# Patient Record
Sex: FEMALE | Race: WHITE | URBAN - METROPOLITAN AREA
[De-identification: names, ages, dates, MRNs, and addresses within clinical notes are randomized per-mention and may not be internally consistent; named-entity substitution may affect disease eponyms.]

---

## 2018-07-08 ENCOUNTER — INPATIENT (INPATIENT)
Facility: HOSPITAL | Age: 83
LOS: 2 days | Discharge: NURSING, REHAB OR LONG-TERM CARE | End: 2018-07-11
Attending: INTERNAL MEDICINE | Admitting: INTERNAL MEDICINE
Payer: MEDICARE

## 2018-07-08 VITALS
SYSTOLIC BLOOD PRESSURE: 132 MMHG | RESPIRATION RATE: 18 BRPM | TEMPERATURE: 99 F | DIASTOLIC BLOOD PRESSURE: 67 MMHG | HEART RATE: 103 BPM | WEIGHT: 179.02 LBS | OXYGEN SATURATION: 95 %

## 2018-07-08 DIAGNOSIS — Z90.49 ACQUIRED ABSENCE OF OTHER SPECIFIED PARTS OF DIGESTIVE TRACT: Chronic | ICD-10-CM

## 2018-07-08 LAB
ADD ON TEST-SPECIMEN IN LAB: SIGNIFICANT CHANGE UP
ALBUMIN SERPL ELPH-MCNC: 3.5 G/DL — SIGNIFICANT CHANGE UP (ref 3.3–5)
ALP SERPL-CCNC: 72 U/L — SIGNIFICANT CHANGE UP (ref 40–120)
ALT FLD-CCNC: 15 U/L — SIGNIFICANT CHANGE UP (ref 12–78)
ANION GAP SERPL CALC-SCNC: 7 MMOL/L — SIGNIFICANT CHANGE UP (ref 5–17)
APPEARANCE UR: CLEAR — SIGNIFICANT CHANGE UP
AST SERPL-CCNC: 21 U/L — SIGNIFICANT CHANGE UP (ref 15–37)
BACTERIA # UR AUTO: ABNORMAL
BASOPHILS # BLD AUTO: 0.04 K/UL — SIGNIFICANT CHANGE UP (ref 0–0.2)
BASOPHILS NFR BLD AUTO: 0.4 % — SIGNIFICANT CHANGE UP (ref 0–2)
BILIRUB SERPL-MCNC: 0.7 MG/DL — SIGNIFICANT CHANGE UP (ref 0.2–1.2)
BILIRUB UR-MCNC: NEGATIVE — SIGNIFICANT CHANGE UP
BUN SERPL-MCNC: 30 MG/DL — HIGH (ref 7–23)
CALCIUM SERPL-MCNC: 9.8 MG/DL — SIGNIFICANT CHANGE UP (ref 8.5–10.1)
CHLORIDE SERPL-SCNC: 103 MMOL/L — SIGNIFICANT CHANGE UP (ref 96–108)
CK SERPL-CCNC: 258 U/L — HIGH (ref 26–192)
CO2 SERPL-SCNC: 29 MMOL/L — SIGNIFICANT CHANGE UP (ref 22–31)
COLOR SPEC: YELLOW — SIGNIFICANT CHANGE UP
CREAT SERPL-MCNC: 1.39 MG/DL — HIGH (ref 0.5–1.3)
DIFF PNL FLD: ABNORMAL
EOSINOPHIL # BLD AUTO: 0.05 K/UL — SIGNIFICANT CHANGE UP (ref 0–0.5)
EOSINOPHIL NFR BLD AUTO: 0.5 % — SIGNIFICANT CHANGE UP (ref 0–6)
EPI CELLS # UR: SIGNIFICANT CHANGE UP
GLUCOSE SERPL-MCNC: 104 MG/DL — HIGH (ref 70–99)
GLUCOSE UR QL: NEGATIVE MG/DL — SIGNIFICANT CHANGE UP
HCT VFR BLD CALC: 43.2 % — SIGNIFICANT CHANGE UP (ref 34.5–45)
HGB BLD-MCNC: 14.2 G/DL — SIGNIFICANT CHANGE UP (ref 11.5–15.5)
HYALINE CASTS # UR AUTO: ABNORMAL /LPF
IMM GRANULOCYTES NFR BLD AUTO: 0.2 % — SIGNIFICANT CHANGE UP (ref 0–1.5)
KETONES UR-MCNC: ABNORMAL
LACTATE SERPL-SCNC: 0.9 MMOL/L — SIGNIFICANT CHANGE UP (ref 0.7–2)
LACTATE SERPL-SCNC: 2.6 MMOL/L — HIGH (ref 0.7–2)
LEUKOCYTE ESTERASE UR-ACNC: ABNORMAL
LYMPHOCYTES # BLD AUTO: 1.42 K/UL — SIGNIFICANT CHANGE UP (ref 1–3.3)
LYMPHOCYTES # BLD AUTO: 13.3 % — SIGNIFICANT CHANGE UP (ref 13–44)
MCHC RBC-ENTMCNC: 28.6 PG — SIGNIFICANT CHANGE UP (ref 27–34)
MCHC RBC-ENTMCNC: 32.9 GM/DL — SIGNIFICANT CHANGE UP (ref 32–36)
MCV RBC AUTO: 87.1 FL — SIGNIFICANT CHANGE UP (ref 80–100)
MONOCYTES # BLD AUTO: 0.88 K/UL — SIGNIFICANT CHANGE UP (ref 0–0.9)
MONOCYTES NFR BLD AUTO: 8.2 % — SIGNIFICANT CHANGE UP (ref 2–14)
NEUTROPHILS # BLD AUTO: 8.26 K/UL — HIGH (ref 1.8–7.4)
NEUTROPHILS NFR BLD AUTO: 77.4 % — HIGH (ref 43–77)
NITRITE UR-MCNC: NEGATIVE — SIGNIFICANT CHANGE UP
NRBC # BLD: 0 /100 WBCS — SIGNIFICANT CHANGE UP (ref 0–0)
PH UR: 5 — SIGNIFICANT CHANGE UP (ref 5–8)
PLATELET # BLD AUTO: 267 K/UL — SIGNIFICANT CHANGE UP (ref 150–400)
POTASSIUM SERPL-MCNC: 5 MMOL/L — SIGNIFICANT CHANGE UP (ref 3.5–5.3)
POTASSIUM SERPL-SCNC: 5 MMOL/L — SIGNIFICANT CHANGE UP (ref 3.5–5.3)
PROT SERPL-MCNC: 7.1 GM/DL — SIGNIFICANT CHANGE UP (ref 6–8.3)
PROT UR-MCNC: 100 MG/DL
RBC # BLD: 4.96 M/UL — SIGNIFICANT CHANGE UP (ref 3.8–5.2)
RBC # FLD: 13 % — SIGNIFICANT CHANGE UP (ref 10.3–14.5)
RBC CASTS # UR COMP ASSIST: ABNORMAL /HPF (ref 0–4)
SODIUM SERPL-SCNC: 139 MMOL/L — SIGNIFICANT CHANGE UP (ref 135–145)
SP GR SPEC: 1.02 — SIGNIFICANT CHANGE UP (ref 1.01–1.02)
UROBILINOGEN FLD QL: NEGATIVE MG/DL — SIGNIFICANT CHANGE UP
WBC # BLD: 10.67 K/UL — HIGH (ref 3.8–10.5)
WBC # FLD AUTO: 10.67 K/UL — HIGH (ref 3.8–10.5)
WBC UR QL: ABNORMAL

## 2018-07-08 PROCEDURE — 99285 EMERGENCY DEPT VISIT HI MDM: CPT

## 2018-07-08 PROCEDURE — 74176 CT ABD & PELVIS W/O CONTRAST: CPT | Mod: 26

## 2018-07-08 PROCEDURE — 71250 CT THORAX DX C-: CPT | Mod: 26

## 2018-07-08 PROCEDURE — 70450 CT HEAD/BRAIN W/O DYE: CPT | Mod: 26

## 2018-07-08 PROCEDURE — 72125 CT NECK SPINE W/O DYE: CPT | Mod: 26

## 2018-07-08 RX ORDER — CEFTRIAXONE 500 MG/1
1 INJECTION, POWDER, FOR SOLUTION INTRAMUSCULAR; INTRAVENOUS EVERY 24 HOURS
Qty: 0 | Refills: 0 | Status: DISCONTINUED | OUTPATIENT
Start: 2018-07-08 | End: 2018-07-08

## 2018-07-08 RX ORDER — SODIUM CHLORIDE 9 MG/ML
1000 INJECTION INTRAMUSCULAR; INTRAVENOUS; SUBCUTANEOUS ONCE
Qty: 0 | Refills: 0 | Status: COMPLETED | OUTPATIENT
Start: 2018-07-08 | End: 2018-07-08

## 2018-07-08 RX ORDER — CEFTRIAXONE 500 MG/1
1000 INJECTION, POWDER, FOR SOLUTION INTRAMUSCULAR; INTRAVENOUS EVERY 24 HOURS
Qty: 0 | Refills: 0 | Status: DISCONTINUED | OUTPATIENT
Start: 2018-07-08 | End: 2018-07-09

## 2018-07-08 RX ORDER — LIDOCAINE 4 G/100G
1 CREAM TOPICAL ONCE
Qty: 0 | Refills: 0 | Status: COMPLETED | OUTPATIENT
Start: 2018-07-08 | End: 2018-07-08

## 2018-07-08 RX ORDER — ACETAMINOPHEN 500 MG
1000 TABLET ORAL ONCE
Qty: 0 | Refills: 0 | Status: COMPLETED | OUTPATIENT
Start: 2018-07-08 | End: 2018-07-08

## 2018-07-08 RX ORDER — MORPHINE SULFATE 50 MG/1
2 CAPSULE, EXTENDED RELEASE ORAL ONCE
Qty: 0 | Refills: 0 | Status: DISCONTINUED | OUTPATIENT
Start: 2018-07-08 | End: 2018-07-08

## 2018-07-08 RX ADMIN — CEFTRIAXONE 1000 MILLIGRAM(S): 500 INJECTION, POWDER, FOR SOLUTION INTRAMUSCULAR; INTRAVENOUS at 22:17

## 2018-07-08 RX ADMIN — Medication 1000 MILLIGRAM(S): at 19:50

## 2018-07-08 RX ADMIN — LIDOCAINE 1 PATCH: 4 CREAM TOPICAL at 21:41

## 2018-07-08 RX ADMIN — SODIUM CHLORIDE 500 MILLILITER(S): 9 INJECTION INTRAMUSCULAR; INTRAVENOUS; SUBCUTANEOUS at 19:50

## 2018-07-08 RX ADMIN — SODIUM CHLORIDE 1000 MILLILITER(S): 9 INJECTION INTRAMUSCULAR; INTRAVENOUS; SUBCUTANEOUS at 18:00

## 2018-07-08 RX ADMIN — MORPHINE SULFATE 2 MILLIGRAM(S): 50 CAPSULE, EXTENDED RELEASE ORAL at 21:41

## 2018-07-08 NOTE — H&P ADULT - NSHPPHYSICALEXAM_GEN_ALL_CORE
Vital Signs Last 24 Hrs  T(C): 37 (08 Jul 2018 17:18), Max: 37 (08 Jul 2018 17:18)  T(F): 98.6 (08 Jul 2018 17:18), Max: 98.6 (08 Jul 2018 17:18)  HR: 103 (08 Jul 2018 17:18) (103 - 103)  BP: 132/67 (08 Jul 2018 17:18) (132/67 - 132/67)  RR: 18 (08 Jul 2018 17:18) (18 - 18)  SpO2: 95% (08 Jul 2018 17:18) (95% - 95%)

## 2018-07-08 NOTE — ED ADULT NURSE REASSESSMENT NOTE - NS ED NURSE REASSESS COMMENT FT1
pt removed from bedpan, urine clear and yellow, specimen collected and sent to lab. pt repositioned for comfort, will continue to monitor
Assumed care of patient from IESHA Diaz. Patient resting comfortably in bed. Patient boosted in bed to eat turkey sandwich and tea. Safety and comfort maintained. Will continue to [facility].

## 2018-07-08 NOTE — ED PROVIDER NOTE - PSYCHIATRIC, MLM
Alert and oriented to person, place, time/situation. normal mood and affect. no apparent risk to self or others. No apparent risk to self or others.

## 2018-07-08 NOTE — H&P ADULT - ASSESSMENT
85 yo F with PMH significant for HTN, Hypothyroid presents to the ED s/p fall at home.    # S/p Mechanical fall at home  # elevated CPK  - Admit to med-surg  - Pt received IVF NS 2 L in the ED, will c/w IV gentle hydration  - repeat CPK  - CTAP/CT chest/CT cervical spine - resulted as negative for acute findings preliminary report - f/u final report  - CT lumbar spine?  - Fall precaution  - PT eval    # Dehydration/ANDREZ? ( No past EMR -Serum. Cr to comapre)  - Pt received IVF NS 2L in the ED, will c/w IV gentle hydration  - Monitor BUN/Cr    # Sepsis 2/2 to UTI  # Tachycardia/ Elevated Lactate 2.6 on admission/UA indicative of UTI  - IV Ceftriaxone  - UC  - Repeat lactate s/p 2L IVF NS bolus resulted as 0.9    # CTAP incidental findings: +Small hiatal hernia. 2.2 cm abnormal mass in the partially calcified mesenteric lesions.  - Outpt fu with PCP after discharge for further eval.    # Hx of HTN  - Hold on Lisinopril 2/2 to Elevated Cr  - Hold on Spironolactone for now as pt is receiving IVF   - Monitor BP    # Hypothyroid  - TSH check  - c/w [place] Thyroid    # DVT Ppx  - SCD's     Case d/w  87 yo F with PMH significant for HTN, Hypothyroid presents to the ED s/p fall at home.    # S/p Mechanical fall at home  # elevated CPK  - Admit to med-surg  - Pt received IVF NS 2 L in the ED, will c/w IV gentle hydration  - repeat CPK  - CTAP/CT chest/CT cervical spine - resulted as negative for acute findings preliminary report - f/u final report  - CT lumbar spine?  - Fall precaution  - PT eval    # Dehydration/ANDREZ? ( No past EMR -Serum. Cr to comapre)  - Pt received IVF NS 2L in the ED, will c/w IV gentle hydration  - Monitor BUN/Cr    # UTI  # Tachycardia/ Elevated Lactate 2.6 on admission/UA indicative of UTI  - IV Ceftriaxone  - UC  - Repeat lactate s/p 2L IVF NS bolus resulted as 0.9    # CTAP incidental findings: +Small hiatal hernia. 2.2 cm abnormal mass in the partially calcified mesenteric lesions.  - No abdominal pain at present. Abdomen is benign on exam  - consider outpt f/u with PCP further eval.    # Hx of HTN  - Hold on Lisinopril 2/2 to Elevated Cr  - Hold on Spironolactone for now as pt is receiving IVF   - Monitor BP    # Hypothyroid  - TSH check  - c/w [place] Thyroid    # DVT Ppx  - SCD's     Case d/w Dr. Schwarz 85 yo F with PMH significant for HTN, Hypothyroid presents to the ED s/p fall at home.    # S/p Mechanical fall at home  # elevated CPK  - Admit to med-surg  - Pt received IVF NS 2 L in the ED- repeat CPK  - Fall precaution  - PT eval    # Dehydration/ANDREZ? ( No past EMR -Serum. Cr to comapre)  - Pt received IVF NS 2L in the ED  - Monitor BUN/Cr    # UTI  # Tachycardia/ Elevated Lactate 2.6 on admission/UA indicative of UTI  - IV Ceftriaxone  - UC  - Repeat lactate s/p 2L IVF NS bolus resulted as 0.9    # CTAP incidental findings: +Small hiatal hernia. 2.2 cm abnormal mass in the partially calcified mesenteric lesions.  - No abdominal pain at present. Abdomen is benign on exam  - consider outpt f/u with PCP further eval.    # Hx of HTN  - Hold on Lisinopril 2/2 to Elevated Cr  - Hold on Spironolactone for now as pt is receiving IVF   - Monitor BP    # Hypothyroid  - TSH check  - c/w [place] Thyroid    # DVT Ppx  - SCD's     Case d/w Dr. Schwarz 85 yo F with PMH significant for HTN, Hypothyroid presents to the ED s/p fall at home.    # S/p Mechanical fall at home  # elevated CPK  - Admit to med-surg  - Pt received IVF NS 2 L in the ED- repeat CPK  - Fall precaution  - PT eval    # Dehydration/ANDREZ? ( No past EMR -Serum. Cr to comapre)  - Pt received IVF NS 2L in the ED  - Monitor BUN/Cr    # UTI  # Tachycardia/ Elevated Lactate 2.6 on admission/UA indicative of UTI  - IV Ceftriaxone  - UC  - Repeat lactate s/p 2L IVF NS bolus resulted as 0.9    # CTAP incidental findings: 2.2 cm abnormal mass in the partially calcified mesenteric lesions.  - Surgery consult to obtain sample and eval.  r/o underlying malignancy    # Hx of HTN  - Hold on Lisinopril 2/2 to Elevated Cr  - Hold on Spironolactone for now as pt is receiving IVF   - Monitor BP    # Hypothyroid  - TSH check  - c/w [place] Thyroid    # DVT Ppx  - SCD's     Case d/w Dr. Schwarz

## 2018-07-08 NOTE — ED PROVIDER NOTE - ATTENDING CONTRIBUTION TO CARE
I, Michaela Mcgregor MD, personally saw the patient with ACP.  I have personally performed a face to face diagnostic evaluation on this patient.  I have reviewed the ACP note and agree with the history, exam, and plan of care, except as noted.

## 2018-07-08 NOTE — ED ADULT TRIAGE NOTE - CHIEF COMPLAINT QUOTE
pt presents to ED due to complaints of a fall, pt states she slipped off the bed this AM and was on the floor waiting for her son to arrive compalints of buttock pain

## 2018-07-08 NOTE — ED ADULT NURSE NOTE - OBJECTIVE STATEMENT
Pt assignment received with pt already in bed with no EMS. As per report, pt unwitnessed fall at home with life alert bracelet pressed.  Pt reports that she slid out of bed and was unable to get up off the floor. Pt unable to describe how long.  As per pt, no loc. Pt denies dizziness.  Pt moves all extremities.  MD to bedside.

## 2018-07-08 NOTE — ED PROVIDER NOTE - MEDICAL DECISION MAKING DETAILS
86 year old female lives alone fell at home and could not get up now with back pain, had some mild abd tenderness on exam, will get labs, urine, and ct scans and admit for patient safety

## 2018-07-08 NOTE — H&P ADULT - ATTENDING COMMENTS
Patient seen and examined at bedside after initial evaluation above by KARENA Olivas. Case discussed and reviewed in detail. Please note my plan below,      85 y/o F with PMH of HTN, hypothyroidism, p/w mechanical fall    *Back pain s/p mechanical fall   -Lumbar x-ray for further evaluation   -Elevated CK -> repeat CK s/p IVF in ED  -Fall precautions   -PT eval  -Vitamin B12 / TSH  -CTH demonstrates ventriculomegaly and cortical sulci   widening consistent with generalized atrophy -> outpatient neuro evaluation if no neuro symptoms develop    *Incidentally noted on CT - 2.2 cm abnormal mass in the partially calcified mesenteric   -Surgery consult for possible sample  -Will need to r/o underlying malignancy     *ANDREZ 2/2 dehydration  -S/p NS 2L in ED -> trend creatinine in AM for improvement    *UTI   -Ceftriaxone  -Urine cx / Blood cx  -Lactate 2.6 -> 0.9    *Small hiatal hernia  -Outpatient f/u for further management     *DVT ppx  -SCDs Patient seen and examined at bedside after initial evaluation above by KARENA Olivas. Case discussed and reviewed in detail. Please note my plan below,      85 y/o F with PMH of HTN, hypothyroidism, p/w mechanical fall    *Back pain s/p mechanical fall   -Lumbar x-ray for further evaluation   -Elevated CK -> repeat CK s/p IVF in ED  -Fall precautions   -PT eval  -Vitamin B12 / TSH  -CTH demonstrates ventriculomegaly and cortical sulci   widening consistent with generalized atrophy -> outpatient neuro evaluation if no neuro symptoms develop during hospitalization    *Incidentally noted on CT - 2.2 cm abnormal mass in the partially calcified mesenteric   -Surgery consult for possible sample  -Will need to r/o underlying malignancy     *ANDREZ 2/2 dehydration  -S/p NS 2L in ED -> trend creatinine in AM for improvement    *UTI   -Ceftriaxone  -Urine cx / Blood cx  -Lactate 2.6 -> 0.9    *Small hiatal hernia  -[facility] f/u for further management     *H/o HTN / hypothyroidism  -If conditions continue to be stable during hospitalization -> outpatient f/u for further management    *DVT ppx  -SCDs

## 2018-07-08 NOTE — ED PROVIDER NOTE - PROGRESS NOTE DETAILS
Work up revealing UTI. Patient unsafe to discharge home as she lives alone, will admit for possible reha placement and continued abx treatment Family members wish to leave their callback information. Elijah (daughter in law) can be reached at [phone] and Ash (son) at [phone]

## 2018-07-08 NOTE — ED PROVIDER NOTE - OBJECTIVE STATEMENT
86 year old female with PMHx of hypothyroidism, HTN, CKD, presents to ED BIBA from home where she lives alone after triggering her life alert necklace because she had fallen off of her bed "in her sleep" and could not get up after many attempts over the time span of a few hours.  Patient is reporting some low back and L hip pain.  Her daughter is concerned that she could have a UTI.  Patient denies numbness, weakness, paralysis, head injury, LOC, visual changes, N/V/D.

## 2018-07-08 NOTE — H&P ADULT - HISTORY OF PRESENT ILLNESS
85 yo F with PMH significant for HTN, Hypothyroid presents to the ED s/p fall at home. Pt reported that while pt was sleeping in bed today, fell on the floor and was on floor for approx. 5 hours s/p fall.  C/o hit her head on floor, CT Head negative in the ED. Denies dizziness, chest pain, palpitation or SOB prior or after fall. c/o back pain s/p fall. Denies any loss of bowel or bladder function. Pt reported that this is 2nd fall in the last 6 months.      In the ED, CT cervical spine/Ct chest and CTAP was done resulted as negative for acute injury or hemorrhage, fracture or dislocation. Pt received IV ceftriaxone for UTI and IVF NS 2L.

## 2018-07-08 NOTE — ED PROVIDER NOTE - MUSCULOSKELETAL MINIMAL EXAM
TENDERNESS/TTP over L hip, no overlying ecchymosis, full passive ROM/atraumatic/normal range of motion

## 2018-07-09 LAB
ANION GAP SERPL CALC-SCNC: 9 MMOL/L — SIGNIFICANT CHANGE UP (ref 5–17)
APTT BLD: 28.6 SEC — SIGNIFICANT CHANGE UP (ref 27.5–37.4)
BUN SERPL-MCNC: 25 MG/DL — HIGH (ref 7–23)
CALCIUM SERPL-MCNC: 8.3 MG/DL — LOW (ref 8.5–10.1)
CHLORIDE SERPL-SCNC: 111 MMOL/L — HIGH (ref 96–108)
CK SERPL-CCNC: 339 U/L — HIGH (ref 26–192)
CO2 SERPL-SCNC: 24 MMOL/L — SIGNIFICANT CHANGE UP (ref 22–31)
CREAT SERPL-MCNC: 0.98 MG/DL — SIGNIFICANT CHANGE UP (ref 0.5–1.3)
GLUCOSE SERPL-MCNC: 101 MG/DL — HIGH (ref 70–99)
HCT VFR BLD CALC: 35.3 % — SIGNIFICANT CHANGE UP (ref 34.5–45)
HGB BLD-MCNC: 11.6 G/DL — SIGNIFICANT CHANGE UP (ref 11.5–15.5)
INR BLD: 1.11 RATIO — SIGNIFICANT CHANGE UP (ref 0.88–1.16)
MAGNESIUM SERPL-MCNC: 1.8 MG/DL — SIGNIFICANT CHANGE UP (ref 1.6–2.6)
MCHC RBC-ENTMCNC: 28 PG — SIGNIFICANT CHANGE UP (ref 27–34)
MCHC RBC-ENTMCNC: 32.9 GM/DL — SIGNIFICANT CHANGE UP (ref 32–36)
MCV RBC AUTO: 85.3 FL — SIGNIFICANT CHANGE UP (ref 80–100)
NRBC # BLD: 0 /100 WBCS — SIGNIFICANT CHANGE UP (ref 0–0)
PLATELET # BLD AUTO: 204 K/UL — SIGNIFICANT CHANGE UP (ref 150–400)
POTASSIUM SERPL-MCNC: 4.3 MMOL/L — SIGNIFICANT CHANGE UP (ref 3.5–5.3)
POTASSIUM SERPL-SCNC: 4.3 MMOL/L — SIGNIFICANT CHANGE UP (ref 3.5–5.3)
PROTHROM AB SERPL-ACNC: 12 SEC — SIGNIFICANT CHANGE UP (ref 9.8–12.7)
RBC # BLD: 4.14 M/UL — SIGNIFICANT CHANGE UP (ref 3.8–5.2)
RBC # FLD: 13.1 % — SIGNIFICANT CHANGE UP (ref 10.3–14.5)
SODIUM SERPL-SCNC: 144 MMOL/L — SIGNIFICANT CHANGE UP (ref 135–145)
TSH SERPL-MCNC: 2.66 UU/ML — SIGNIFICANT CHANGE UP (ref 0.36–3.74)
VIT B12 SERPL-MCNC: 322 PG/ML — SIGNIFICANT CHANGE UP (ref 232–1245)
WBC # BLD: 6.89 K/UL — SIGNIFICANT CHANGE UP (ref 3.8–10.5)
WBC # FLD AUTO: 6.89 K/UL — SIGNIFICANT CHANGE UP (ref 3.8–10.5)

## 2018-07-09 PROCEDURE — 73562 X-RAY EXAM OF KNEE 3: CPT | Mod: 26,RT

## 2018-07-09 PROCEDURE — 72100 X-RAY EXAM L-S SPINE 2/3 VWS: CPT | Mod: 26

## 2018-07-09 RX ORDER — ACETAMINOPHEN 500 MG
650 TABLET ORAL EVERY 6 HOURS
Qty: 0 | Refills: 0 | Status: DISCONTINUED | OUTPATIENT
Start: 2018-07-09 | End: 2018-07-11

## 2018-07-09 RX ORDER — CITALOPRAM 10 MG/1
20 TABLET, FILM COATED ORAL DAILY
Qty: 0 | Refills: 0 | Status: DISCONTINUED | OUTPATIENT
Start: 2018-07-09 | End: 2018-07-11

## 2018-07-09 RX ORDER — CEFTRIAXONE 500 MG/1
1 INJECTION, POWDER, FOR SOLUTION INTRAMUSCULAR; INTRAVENOUS EVERY 24 HOURS
Qty: 0 | Refills: 0 | Status: DISCONTINUED | OUTPATIENT
Start: 2018-07-09 | End: 2018-07-09

## 2018-07-09 RX ORDER — CEFTRIAXONE 500 MG/1
1000 INJECTION, POWDER, FOR SOLUTION INTRAMUSCULAR; INTRAVENOUS EVERY 24 HOURS
Qty: 0 | Refills: 0 | Status: DISCONTINUED | OUTPATIENT
Start: 2018-07-09 | End: 2018-07-11

## 2018-07-09 RX ADMIN — CEFTRIAXONE 1000 MILLIGRAM(S): 500 INJECTION, POWDER, FOR SOLUTION INTRAMUSCULAR; INTRAVENOUS at 21:15

## 2018-07-09 RX ADMIN — LIDOCAINE 1 PATCH: 4 CREAM TOPICAL at 19:16

## 2018-07-09 RX ADMIN — CITALOPRAM 20 MILLIGRAM(S): 10 TABLET, FILM COATED ORAL at 11:47

## 2018-07-09 NOTE — PROGRESS NOTE ADULT - SUBJECTIVE AND OBJECTIVE BOX
CHIEF COMPLAINT:    SUBJECTIVE:     REVIEW OF SYSTEMS:    CONSTITUTIONAL: No weakness, fevers or chills  EYES/ENT: No visual changes;  No vertigo or throat pain   NECK: No pain or stiffness  RESPIRATORY: No cough, wheezing, hemoptysis; No shortness of breath  CARDIOVASCULAR: No chest pain or palpitations  GASTROINTESTINAL: No abdominal or epigastric pain. No nausea, vomiting, or hematemesis; No diarrhea or constipation. No melena or hematochezia.  GENITOURINARY: No dysuria, frequency or hematuria  NEUROLOGICAL: No numbness or weakness  SKIN: No itching, burning, rashes, or lesions   All other review of systems is negative unless indicated above    Vital Signs Last 24 Hrs  T(C): 36.6 (09 Jul 2018 05:23), Max: 37 (08 Jul 2018 17:18)  T(F): 97.8 (09 Jul 2018 05:23), Max: 98.6 (08 Jul 2018 17:18)  HR: 82 (09 Jul 2018 05:23) (82 - 103)  BP: 146/83 (09 Jul 2018 05:23) (108/45 - 146/83)  BP(mean): --  RR: 18 (09 Jul 2018 03:18) (16 - 20)  SpO2: 96% (09 Jul 2018 05:23) (95% - 97%)    I&O's Summary      CAPILLARY BLOOD GLUCOSE          PHYSICAL EXAM:    Constitutional: NAD, awake and alert, well-developed  HEENT: PERR, EOMI, Normal Hearing, MMM  Neck: Soft and supple, No LAD, No JVD  Respiratory: Breath sounds are clear bilaterally, No wheezing, rales or rhonchi  Cardiovascular: S1 and S2, regular rate and rhythm, no Murmurs, gallops or rubs  Gastrointestinal: Bowel Sounds present, soft, nontender, nondistended, no guarding, no rebound  Extremities: No peripheral edema  Vascular: 2+ peripheral pulses  Neurological: A/O x 3, no focal deficits  Musculoskeletal: 5/5 strength b/l upper and lower extremities  Skin: No rashes    MEDICATIONS:  MEDICATIONS  (STANDING):  cefTRIAXone Injectable. 1000 milliGRAM(s) IV Push every 24 hours  citalopram 20 milliGRAM(s) Oral daily      LABS: All Labs Reviewed:                        11.6   6.89  )-----------( 204      ( 09 Jul 2018 07:14 )             35.3     07-09    144  |  111<H>  |  25<H>  ----------------------------<  101<H>  4.3   |  24  |  0.98    Ca    8.3<L>      09 Jul 2018 07:14  Mg     1.8     07-09    TPro  7.1  /  Alb  3.5  /  TBili  0.7  /  DBili  x   /  AST  21  /  ALT  15  /  AlkPhos  72  07-08    PT/INR - ( 09 Jul 2018 07:14 )   PT: 12.0 sec;   INR: 1.11 ratio         PTT - ( 09 Jul 2018 07:14 )  PTT:28.6 sec  CARDIAC MARKERS ( 09 Jul 2018 07:14 )  x     / x     / 339 U/L / x     / x      CARDIAC MARKERS ( 08 Jul 2018 18:03 )  x     / x     / 258 U/L / x     / x                  85 yo F with PMH significant for HTN, Hypothyroid presents to the ED s/p fall at home.    # S/p Mechanical fall at home  # elevated CPK  - Admit to med-surg  - Pt received IVF NS 2 L in the ED- repeat CPK  - Fall precaution  - PT eval    # Dehydration/ANDREZ? ( No past EMR -Serum. Cr to comapre)  - Pt received IVF NS 2L in the ED  - Monitor BUN/Cr    # UTI  # Tachycardia/ Elevated Lactate 2.6 on admission/UA indicative of UTI  - IV Ceftriaxone  - UC  - Repeat lactate s/p 2L IVF NS bolus resulted as 0.9    # CTAP incidental findings: 2.2 cm abnormal mass in the partially calcified mesenteric lesions.  - Surgery consult to obtain sample and eval.  r/o underlying malignancy    # Hx of HTN  - Hold on Lisinopril 2/2 to Elevated Cr  - Hold on Spironolactone for now as pt is receiving IVF   - Monitor BP    # Hypothyroid  - TSH check  - c/w [place] Thyroid    # DVT Ppx  - SCD's CHIEF COMPLAINT/Diagnosis: s/p mechanical fall/ dehydration/ ANDREZ    SUBJECTIVE: no complaints    REVIEW OF SYSTEMS:    CONSTITUTIONAL: No weakness, fevers or chills  EYES/ENT: No visual changes;  No vertigo or throat pain   NECK: No pain or stiffness  RESPIRATORY: No cough, wheezing, hemoptysis; No shortness of breath  CARDIOVASCULAR: No chest pain or palpitations  GASTROINTESTINAL: No abdominal or epigastric pain. No nausea, vomiting, or hematemesis; No diarrhea or constipation. No melena or hematochezia.  GENITOURINARY: No dysuria, frequency or hematuria  NEUROLOGICAL: No numbness or weakness  SKIN: No itching, burning, rashes, or lesions   All other review of systems is negative unless indicated above    Vital Signs Last 24 Hrs  T(C): 36.6 (09 Jul 2018 05:23), Max: 37 (08 Jul 2018 17:18)  T(F): 97.8 (09 Jul 2018 05:23), Max: 98.6 (08 Jul 2018 17:18)  HR: 82 (09 Jul 2018 05:23) (82 - 103)  BP: 146/83 (09 Jul 2018 05:23) (108/45 - 146/83)  BP(mean): --  RR: 18 (09 Jul 2018 03:18) (16 - 20)  SpO2: 96% (09 Jul 2018 05:23) (95% - 97%)    I&O's Summary      CAPILLARY BLOOD GLUCOSE          PHYSICAL EXAM:    Constitutional: NAD, awake and alert, well-developed  HEENT: PERR, EOMI, Normal Hearing, MMM  Neck: Soft and supple, No LAD, No JVD  Respiratory: Breath sounds are clear bilaterally, No wheezing, rales or rhonchi  Cardiovascular: S1 and S2, regular rate and rhythm, no Murmurs, gallops or rubs  Gastrointestinal: Bowel Sounds present, soft, nontender, nondistended, no guarding, no rebound  Extremities: No peripheral edema  Vascular: 2+ peripheral pulses  Neurological: A/O x 3, no focal deficits  Musculoskeletal: 5/5 strength b/l upper and lower extremities  Skin: No rashes    MEDICATIONS:  MEDICATIONS  (STANDING):  cefTRIAXone Injectable. 1000 milliGRAM(s) IV Push every 24 hours  citalopram 20 milliGRAM(s) Oral daily      LABS: All Labs Reviewed:                        11.6   6.89  )-----------( 204      ( 09 Jul 2018 07:14 )             35.3     07-09    144  |  111<H>  |  25<H>  ----------------------------<  101<H>  4.3   |  24  |  0.98    Ca    8.3<L>      09 Jul 2018 07:14  Mg     1.8     07-09    TPro  7.1  /  Alb  3.5  /  TBili  0.7  /  DBili  x   /  AST  21  /  ALT  15  /  AlkPhos  72  07-08    PT/INR - ( 09 Jul 2018 07:14 )   PT: 12.0 sec;   INR: 1.11 ratio         PTT - ( 09 Jul 2018 07:14 )  PTT:28.6 sec  CARDIAC MARKERS ( 09 Jul 2018 07:14 )  x     / x     / 339 U/L / x     / x      CARDIAC MARKERS ( 08 Jul 2018 18:03 )  x     / x     / 258 U/L / x     / x                  85 yo F with PMH significant for HTN, Hypothyroid presents to the ED s/p fall at home.    # S/p Mechanical fall at home  -low end ck  - Pt received IVF NS 2 L in the ED- repeat CPK  - Fall precaution  - PT eval pending; will likely need rehab    # Dehydration/ANDREZ? ( No past EMR -Serum. Cr to comapre)  -now resolved with iv flu ids    # UTI  # Tachycardia/ Elevated Lactate 2.6 on admission/UA indicative of UTI  - IV Ceftriaxone day 2  -f/u urine culture    # CTAP incidental findings: 2.2 cm abnormal mass in the partially calcified mesenteric lesions.  - Surgery consult to obtain sample and eval.  r/o underlying malignancy    # Hx of HTN  - Hold on Lisinopril 2/2 to Elevated Cr  - Hold on Spironolactone for now as pt is receiving IVF   - Monitor BP    # Hypothyroid  - TSH check  - c/w [place] Thyroid    # DVT Ppx  - SCD's

## 2018-07-09 NOTE — PATIENT PROFILE ADULT. - VISION (WITH CORRECTIVE LENSES IF THE PATIENT USUALLY WEARS THEM):
Partially impaired: cannot see medication labels or newsprint, but can see obstacles in path, and the surrounding layout; can count fingers at arm's length/driving only

## 2018-07-09 NOTE — PATIENT PROFILE ADULT. - REASON FOR ADMISSION
Pt fell out of bed onto floor, remained there for 5 hours. She was finally able to reach her life alert button

## 2018-07-09 NOTE — PHYSICAL THERAPY INITIAL EVALUATION ADULT - PERTINENT HX OF CURRENT PROBLEM, REHAB EVAL
86F presents to the ED s/p fall at home. Pt reported that while pt was sleeping in bed, fell on the floor and was on floor for approx. 5 hours s/p fall.  C/o hit her head on floor, CT Head negative in the ED. In the ED, CT cervical spine/Ct chest and CTAP was done resulted as negative for acute injury or hemorrhage, fracture or dislocation. 86F presents to the ED s/p fall at home. Pt reported that while pt was sleeping in bed, fell on the floor and was on floor for approx. 5 hours s/p fall.  C/o hit her head on floor, CT Head negative in the ED. In the ED, CT cervical spine/Ct chest and CTAP was done resulted as negative for acute injury or hemorrhage, fracture or dislocation. XR knee, LS (-) acute findings.

## 2018-07-10 PROCEDURE — 99223 1ST HOSP IP/OBS HIGH 75: CPT

## 2018-07-10 RX ORDER — SPIRONOLACTONE 25 MG/1
25 TABLET, FILM COATED ORAL DAILY
Qty: 0 | Refills: 0 | Status: DISCONTINUED | OUTPATIENT
Start: 2018-07-10 | End: 2018-07-11

## 2018-07-10 RX ORDER — LISINOPRIL 2.5 MG/1
5 TABLET ORAL DAILY
Qty: 0 | Refills: 0 | Status: DISCONTINUED | OUTPATIENT
Start: 2018-07-10 | End: 2018-07-11

## 2018-07-10 RX ORDER — LEVOTHYROXINE SODIUM 125 MCG
88 TABLET ORAL DAILY
Qty: 0 | Refills: 0 | Status: DISCONTINUED | OUTPATIENT
Start: 2018-07-10 | End: 2018-07-10

## 2018-07-10 RX ADMIN — CITALOPRAM 20 MILLIGRAM(S): 10 TABLET, FILM COATED ORAL at 11:44

## 2018-07-10 RX ADMIN — CEFTRIAXONE 1000 MILLIGRAM(S): 500 INJECTION, POWDER, FOR SOLUTION INTRAMUSCULAR; INTRAVENOUS at 21:04

## 2018-07-10 RX ADMIN — LISINOPRIL 5 MILLIGRAM(S): 2.5 TABLET ORAL at 11:45

## 2018-07-10 NOTE — CONSULT NOTE ADULT - SUBJECTIVE AND OBJECTIVE BOX
CC:Patient is a 86y old  Female who presents with a chief complaint of Pt fell out of bed onto floor, remained there for 5 hours. She was finally able to reach her life alert button (09 Jul 2018 03:19)      Subjective:  Pt seen and examined at bedside with chaperone. Pt is AAOx3, pt in no acute distress. Pt denied c/o fever, chills, chest pain, SOB, abd pain, N/V/D, extremity pain or dysfunction, hemoptysis, hematemesis, hematuria, hematochexia, headache, diplopia, vertigo, dizzyness. Pt tolerating diet, (+) void, (+) ambulation, (+) bowel function    ROS:  as abovementioned otherwise negative ROS    PMH: HTN, hypothyroidism  PSH: appendectomy, cholecystectomy  Allergies: Medrol, predniSONE, sulfa drugs  SH: denied etoh, tobacco, illicit drug use  FH: No pertinent family history in first degree relatives.      Vital Signs Last 24 Hrs  T(C): 36.7 (10 Jul 2018 16:01), Max: 36.9 (09 Jul 2018 20:28)  T(F): 98 (10 Jul 2018 16:01), Max: 98.4 (09 Jul 2018 20:28)  HR: 81 (10 Jul 2018 16:01) (73 - 90)  BP: 153/51 (10 Jul 2018 16:01) (103/42 - 153/51)  BP(mean): --  RR: 18 (10 Jul 2018 16:01) (18 - 20)  SpO2: 92% (10 Jul 2018 16:01) (92% - 97%)    Labs:      CARDIAC MARKERS ( 09 Jul 2018 07:14 )  x     / x     / 339 U/L / x     / x      CARDIAC MARKERS ( 08 Jul 2018 18:03 )  x     / x     / 258 U/L / x     / x                                11.6   6.89  )-----------( 204      ( 09 Jul 2018 07:14 )             35.3     CBC Full  -  ( 09 Jul 2018 07:14 )  WBC Count : 6.89 K/uL  Hemoglobin : 11.6 g/dL  Hematocrit : 35.3 %  Platelet Count - Automated : 204 K/uL  Mean Cell Volume : 85.3 fl  Mean Cell Hemoglobin : 28.0 pg  Mean Cell Hemoglobin Concentration : 32.9 gm/dL  Auto Neutrophil # : x  Auto Lymphocyte # : x  Auto Monocyte # : x  Auto Eosinophil # : x  Auto Basophil # : x  Auto Neutrophil % : x  Auto Lymphocyte % : x  Auto Monocyte % : x  Auto Eosinophil % : x  Auto Basophil % : x    07-09    144  |  111<H>  |  25<H>  ----------------------------<  101<H>  4.3   |  24  |  0.98    Ca    8.3<L>      09 Jul 2018 07:14  Mg     1.8     07-09    TPro  7.1  /  Alb  3.5  /  TBili  0.7  /  DBili  x   /  AST  21  /  ALT  15  /  AlkPhos  72  07-08    LIVER FUNCTIONS - ( 08 Jul 2018 18:03 )  Alb: 3.5 g/dL / Pro: 7.1 gm/dL / ALK PHOS: 72 U/L / ALT: 15 U/L / AST: 21 U/L / GGT: x           PT/INR - ( 09 Jul 2018 07:14 )   PT: 12.0 sec;   INR: 1.11 ratio         PTT - ( 09 Jul 2018 07:14 )  PTT:28.6 sec      Meds:  acetaminophen   Tablet. 650 milliGRAM(s) Oral every 6 hours PRN  cefTRIAXone Injectable. 1000 milliGRAM(s) IV Push every 24 hours  citalopram 20 milliGRAM(s) Oral daily  lisinopril 5 milliGRAM(s) Oral daily  spironolactone 25 milliGRAM(s) Oral daily      Radiology:  < from: CT Abdomen and Pelvis No Cont (07.08.18 @ 20:41) >  EXAM:  CT ABDOMEN AND PELVIS                          EXAM:  CT CHEST                            PROCEDURE DATE:  07/08/2018          INTERPRETATION:  CLINICAL HISTORY:    86 years old woman with back pain, status post fall    TECHNIQUE:    Axial computed tomography images of the chest without intravenous   contrast.    MIP reconstructed images were created and reviewed.    Coronal and sagittal reformatted images were created and reviewed.    COMPARISON:    No relevant prior studies available.    FINDINGS:    Lungs: Mild subsegmental atelectasis versus scarring at the bilateral   lung bases.  No infiltrates.   No mass lesion or nodule seen.    Pleural space:  Unremarkable.  No pneumothorax.  No significant   effusion.    Heart:  Unremarkable.  No cardiomegaly.  No significant pericardial   effusion.    Bones/joints:  No evidence of sternal fracture.  Degenerative changes   in the   spine. No evidence of fracture.  No dislocation.    Soft tissues:  Unremarkable.    Vasculature: Atherosclerotic arterial disease, including coronary   artery calcifications.  No thoracic aortic aneurysm.    Lymph nodes:  Unremarkable.  No enlarged lymph nodes.    IMPRESSION:         No evidence of acute pathology.    _______________________________________________    EXAM:    CT Abdomen and Pelvis Without Intravenous Contrast    CLINICAL HISTORY:    86 years old, female; Pain; Abdominal pain; Acute;      TECHNIQUE:    Axial computed tomography images of the abdomen and pelvis without   intravenous contrast.    MIP reconstructed images were created and reviewed.    Coronal and sagittal reformatted images were created and reviewed.    COMPARISON:    No relevant prior studies available.    FINDINGS:    Lung bases:  Unremarkable.  No mass.  No consolidation.    Mediastinum:  Small hiatal hernia.     ABDOMEN:    Liver: Left hepatic lobe cyst.    Gallbladder and bile ducts:  Cholecystectomy.  No fluid in gallbladder   fossa.    No ductal dilation.    Pancreas:  Unremarkable.  No ductal dilation.  Spleen:  Unremarkable.  No splenomegaly.    Adrenals:  Unremarkable.  No mass.    Kidneys and ureters:  Unremarkable.  No obstructing stones.  No   hydronephrosis.    Stomach and bowel: Small hiatal hernia. Few scattered colonic   diverticulosis. No areas of wall   thickening in the large bowel.  No evidence of large bowel obstruction.    No   pericolonic inflammatory changes to suggest acute diverticulitis.  No   wall   thickening in the small bowel.  No evidence of small bowel obstruction.   Appendix not definitively identified.     PELVIS:    Bladder:  Unremarkable.  No stones.    Reproductive:  Unremarkable as visualized.     ABDOMEN and PELVIS:    Intraperitoneal space:  No free fluid or fluid collections. No   inflammatory   changes. Nofree air. Several calcified fatty nodules in the small bowel   mesentery compatible with fat necrosis.  Bones/joints:  Degenerative changes in the spine. No evidence of   fracture.    Degenerative changes seen in right hip joint. No fracture or dislocation.    Soft tissues:  Unremarkable.    Vasculature:  Unremarkable.  No abdominal aortic aneurysm.    Lymph nodes:  Unremarkable.  No enlarged lymph nodes.      IMPRESSION:       1. No acute pathology                JALEN COLMENARES   This document has been electronically signed. Jul 9 2018 10:17AM    < end of copied text >      Physical exam:  Pt is AAOx3  Pt in no acute distress  CNII-XII grossly intact   HEENT: Normocephalic, atraumatic, RAYMOND, EOM wnl  Neck: No crepitus, no ecchymosis, no hematoma, to exam, no JVD, no tracheal deviation  Cardiovascular: S1S2 Present  Respiratory: Respiratory Effort normal; no wheezes, rales or rhonchi to exam, CTAB  ABD: bowel sounds (+), soft, nontender, non distended, no rebound, no guarding, no rigidity, no skin changes to exam. No pelvic instability to exam, no skin changes, negative polo's sign to exam  Musculoskeletal: All digits are warm and well perfused. Pt demonstrates grossly intact sensoromotor function. Pt has good capillary refill to digits, no calf edema or tenderness to exam.  Skin: no jaundice or icteric sclera to exam b/l, no skin changes to exam

## 2018-07-10 NOTE — PROGRESS NOTE ADULT - SUBJECTIVE AND OBJECTIVE BOX
CHIEF COMPLAINT/Diagnosis:  Fall    SUBJECTIVE: no complaints    REVIEW OF SYSTEMS:    CONSTITUTIONAL: No weakness, fevers or chills  EYES/ENT: No visual changes;  No vertigo or throat pain   NECK: No pain or stiffness  RESPIRATORY: No cough, wheezing, hemoptysis; No shortness of breath  CARDIOVASCULAR: No chest pain or palpitations  GASTROINTESTINAL: No abdominal or epigastric pain. No nausea, vomiting, or hematemesis; No diarrhea or constipation. No melena or hematochezia.  GENITOURINARY: No dysuria, frequency or hematuria  NEUROLOGICAL: No numbness or weakness  SKIN: No itching, burning, rashes, or lesions   All other review of systems is negative unless indicated above    Vital Signs Last 24 Hrs  T(C): 36.4 (10 Jul 2018 11:41), Max: 36.9 (09 Jul 2018 16:14)  T(F): 97.5 (10 Jul 2018 11:41), Max: 98.4 (09 Jul 2018 16:14)  HR: 73 (10 Jul 2018 11:41) (73 - 90)  BP: 103/42 (10 Jul 2018 11:41) (103/42 - 119/34)  BP(mean): --  RR: 20 (10 Jul 2018 11:41) (18 - 20)  SpO2: 97% (10 Jul 2018 11:41) (95% - 97%)    I&O's Summary      CAPILLARY BLOOD GLUCOSE          PHYSICAL EXAM:    Constitutional: NAD, awake and alert, well-developed  HEENT: PERR, EOMI, Normal Hearing, MMM  Neck: Soft and supple, No LAD, No JVD  Respiratory: Breath sounds are clear bilaterally, No wheezing, rales or rhonchi  Cardiovascular: S1 and S2, regular rate and rhythm, no Murmurs, gallops or rubs  Gastrointestinal: Bowel Sounds present, soft, nontender, nondistended, no guarding, no rebound  Extremities: No peripheral edema  Vascular: 2+ peripheral pulses  Neurological: A/O x 3, no focal deficits  Musculoskeletal: 5/5 strength b/l upper and lower extremities  Skin: No rashes    MEDICATIONS:  MEDICATIONS  (STANDING):  cefTRIAXone Injectable. 1000 milliGRAM(s) IV Push every 24 hours  citalopram 20 milliGRAM(s) Oral daily  lisinopril 5 milliGRAM(s) Oral daily  spironolactone 25 milliGRAM(s) Oral daily      LABS: All Labs Reviewed:                        11.6   6.89  )-----------( 204      ( 09 Jul 2018 07:14 )             35.3     07-09    144  |  111<H>  |  25<H>  ----------------------------<  101<H>  4.3   |  24  |  0.98    Ca    8.3<L>      09 Jul 2018 07:14  Mg     1.8     07-09    TPro  7.1  /  Alb  3.5  /  TBili  0.7  /  DBili  x   /  AST  21  /  ALT  15  /  AlkPhos  72  07-08    PT/INR - ( 09 Jul 2018 07:14 )   PT: 12.0 sec;   INR: 1.11 ratio         PTT - ( 09 Jul 2018 07:14 )  PTT:28.6 sec  CARDIAC MARKERS ( 09 Jul 2018 07:14 )  x     / x     / 339 U/L / x     / x      CARDIAC MARKERS ( 08 Jul 2018 18:03 )  x     / x     / 258 U/L / x     / x          Blood Culture: 07-09 @ 02:12  Organism --  Gram Stain Blood -- Gram Stain --  Specimen Source .Blood None  Culture-Blood --      85 yo F with PMH significant for HTN, Hypothyroid presents to the ED s/p fall at home.    # S/p Mechanical fall at home  -low end ck  - Pt received IVF NS 2 L in the ED  - Fall precaution  - PT eval pending; will likely need rehab    # Dehydration/ANDREZ? ( No past EMR -Serum. Cr to comapre)  -now resolved with iv flu ids    # UTI  # Tachycardia/ Elevated Lactate 2.6 on admission/UA indicative of UTI  - IV Ceftriaxone day 3- will d/c end of the day  -f/u urine culture    # CTAP incidental findings: 2.2 cm abnormal mass in the partially calcified mesenteric lesions.  -patient is elderly female whom has  no complaints what so ever related to her abdomen. This is an incidental finding in an elderly female  -recco no further intervention. patient is a poor candidate for surgery w/ recent history of falls, and decompensation  -will fax results to the primary care doctor to monitor its progression; repeat CT in few months.    # Hx of HTN  - Hold on Lisinopril 2/2 to Elevated Cr  - Hold on Spironolactone for now as pt is receiving IVF   - Monitor BP    # Hypothyroid  - TSH check  - c/w [place] Thyroid    # DVT Ppx  - SCD's CHIEF COMPLAINT/Diagnosis:  Fall    SUBJECTIVE: no complaints    REVIEW OF SYSTEMS:    CONSTITUTIONAL: No weakness, fevers or chills  EYES/ENT: No visual changes;  No vertigo or throat pain   NECK: No pain or stiffness  RESPIRATORY: No cough, wheezing, hemoptysis; No shortness of breath  CARDIOVASCULAR: No chest pain or palpitations  GASTROINTESTINAL: No abdominal or epigastric pain. No nausea, vomiting, or hematemesis; No diarrhea or constipation. No melena or hematochezia.  GENITOURINARY: No dysuria, frequency or hematuria  NEUROLOGICAL: No numbness or weakness  SKIN: No itching, burning, rashes, or lesions   All other review of systems is negative unless indicated above    Vital Signs Last 24 Hrs  T(C): 36.4 (10 Jul 2018 11:41), Max: 36.9 (09 Jul 2018 16:14)  T(F): 97.5 (10 Jul 2018 11:41), Max: 98.4 (09 Jul 2018 16:14)  HR: 73 (10 Jul 2018 11:41) (73 - 90)  BP: 103/42 (10 Jul 2018 11:41) (103/42 - 119/34)  BP(mean): --  RR: 20 (10 Jul 2018 11:41) (18 - 20)  SpO2: 97% (10 Jul 2018 11:41) (95% - 97%)    I&O's Summary      CAPILLARY BLOOD GLUCOSE          PHYSICAL EXAM:    Constitutional: NAD, awake and alert, well-developed  HEENT: PERR, EOMI, Normal Hearing, MMM  Neck: Soft and supple, No LAD, No JVD  Respiratory: Breath sounds are clear bilaterally, No wheezing, rales or rhonchi  Cardiovascular: S1 and S2, regular rate and rhythm, no Murmurs, gallops or rubs  Gastrointestinal: Bowel Sounds present, soft, nontender, nondistended, no guarding, no rebound  Extremities: No peripheral edema  Vascular: 2+ peripheral pulses  Neurological: A/O x 3, no focal deficits  Musculoskeletal: 5/5 strength b/l upper and lower extremities  Skin: No rashes    MEDICATIONS:  MEDICATIONS  (STANDING):  cefTRIAXone Injectable. 1000 milliGRAM(s) IV Push every 24 hours  citalopram 20 milliGRAM(s) Oral daily  lisinopril 5 milliGRAM(s) Oral daily  spironolactone 25 milliGRAM(s) Oral daily      LABS: All Labs Reviewed:                        11.6   6.89  )-----------( 204      ( 09 Jul 2018 07:14 )             35.3     07-09    144  |  111<H>  |  25<H>  ----------------------------<  101<H>  4.3   |  24  |  0.98    Ca    8.3<L>      09 Jul 2018 07:14  Mg     1.8     07-09    TPro  7.1  /  Alb  3.5  /  TBili  0.7  /  DBili  x   /  AST  21  /  ALT  15  /  AlkPhos  72  07-08    PT/INR - ( 09 Jul 2018 07:14 )   PT: 12.0 sec;   INR: 1.11 ratio         PTT - ( 09 Jul 2018 07:14 )  PTT:28.6 sec  CARDIAC MARKERS ( 09 Jul 2018 07:14 )  x     / x     / 339 U/L / x     / x      CARDIAC MARKERS ( 08 Jul 2018 18:03 )  x     / x     / 258 U/L / x     / x          Blood Culture: 07-09 @ 02:12  Organism --  Gram Stain Blood -- Gram Stain --  Specimen Source .Blood None  Culture-Blood --      87 yo F with PMH significant for HTN, Hypothyroid presents to the ED s/p fall at home.    # S/p Mechanical fall at home  -low end ck  - Pt received IVF NS 2 L in the ED  - Fall precaution  - PT eval pending; will likely need rehab    # Dehydration/ANDREZ? ( No past EMR -Serum. Cr to comapre)  -now resolved with iv flu ids    # UTI  # Tachycardia/ Elevated Lactate 2.6 on admission/UA indicative of UTI  - IV Ceftriaxone day 3- will d/c end of the day  -f/u urine culture    # CTAP incidental findings: 2.2 cm abnormal mass in the partially calcified mesenteric lesions.  -patient is elderly female whom has  no complaints what so ever related to her abdomen. This is an incidental finding in an elderly female  -recco no further intervention. patient is a poor candidate for surgery w/ recent history of falls, and decompensation  -will fax results to the primary care doctor to monitor its progression; repeat CT in few months.  -her PCP is Dr. Madelaine Zayas; will fax results    # Hx of HTN  - Hold on Lisinopril 2/2 to Elevated Cr  - Hold on Spironolactone for now as pt is receiving IVF   - Monitor BP    # Hypothyroid  - TSH check  - c/w [place] Thyroid    # DVT Ppx  - SCD's

## 2018-07-10 NOTE — CONSULT NOTE ADULT - ASSESSMENT
A/P:  Incidental calcified mesenteric lesions on CT abd/pelvis  Pt does not want surgical intervention for biopsy  Medical management per primary service  Would advise outpatient follow up with surgical oncology at tertiary care center for further evaluation/management as clinically warranted  Medical comorbidities of HTN, hypothyroidism  No acute surgical intervention at this time  REconsult prn surgical needs

## 2018-07-11 ENCOUNTER — TRANSCRIPTION ENCOUNTER (OUTPATIENT)
Age: 83
End: 2018-07-11

## 2018-07-11 VITALS — HEART RATE: 67 BPM | DIASTOLIC BLOOD PRESSURE: 42 MMHG | SYSTOLIC BLOOD PRESSURE: 111 MMHG

## 2018-07-11 LAB
ANION GAP SERPL CALC-SCNC: 9 MMOL/L — SIGNIFICANT CHANGE UP (ref 5–17)
BUN SERPL-MCNC: 19 MG/DL — SIGNIFICANT CHANGE UP (ref 7–23)
CALCIUM SERPL-MCNC: 8.9 MG/DL — SIGNIFICANT CHANGE UP (ref 8.5–10.1)
CHLORIDE SERPL-SCNC: 106 MMOL/L — SIGNIFICANT CHANGE UP (ref 96–108)
CK SERPL-CCNC: 117 U/L — SIGNIFICANT CHANGE UP (ref 26–192)
CO2 SERPL-SCNC: 27 MMOL/L — SIGNIFICANT CHANGE UP (ref 22–31)
CREAT SERPL-MCNC: 0.99 MG/DL — SIGNIFICANT CHANGE UP (ref 0.5–1.3)
GLUCOSE SERPL-MCNC: 93 MG/DL — SIGNIFICANT CHANGE UP (ref 70–99)
POTASSIUM SERPL-MCNC: 4.5 MMOL/L — SIGNIFICANT CHANGE UP (ref 3.5–5.3)
POTASSIUM SERPL-SCNC: 4.5 MMOL/L — SIGNIFICANT CHANGE UP (ref 3.5–5.3)
SODIUM SERPL-SCNC: 142 MMOL/L — SIGNIFICANT CHANGE UP (ref 135–145)

## 2018-07-11 RX ADMIN — LISINOPRIL 5 MILLIGRAM(S): 2.5 TABLET ORAL at 05:13

## 2018-07-11 RX ADMIN — Medication 650 MILLIGRAM(S): at 03:05

## 2018-07-11 RX ADMIN — SPIRONOLACTONE 25 MILLIGRAM(S): 25 TABLET, FILM COATED ORAL at 05:13

## 2018-07-11 RX ADMIN — Medication 650 MILLIGRAM(S): at 11:12

## 2018-07-11 NOTE — DISCHARGE NOTE ADULT - PATIENT PORTAL LINK FT
You can access the [facility] Patient Portal, offered by [facility], by registering with the following website: [email or web address]

## 2018-07-11 NOTE — DISCHARGE NOTE ADULT - CARE PROVIDER_API CALL
Madelaine Zayas), Internal Medicine  [address] [facility]  [address] [place] [address]  Phone: [phone]  Fax: [phone]

## 2018-07-11 NOTE — DISCHARGE NOTE ADULT - CARE PROVIDERS DIRECT ADDRESSES
[email or web address]

## 2018-07-11 NOTE — DISCHARGE NOTE ADULT - MEDICATION SUMMARY - MEDICATIONS TO TAKE
I will START or STAY ON the medications listed below when I get home from the hospital:    Aldactone 25 mg oral tablet  -- 1 tab(s) by mouth once a day  -- Indication: For Htn    lisinopril 5 mg oral tablet  -- 1 tab(s) by mouth once a day  -- Indication: For Htn    citalopram 20 mg oral tablet  -- 1 tab(s) by mouth once a day  -- Indication: For depression    [place] Thyroid 90 mg oral tablet  -- 1 tab(s) by mouth once a day  -- Indication: For Hypothyroid

## 2018-07-11 NOTE — DISCHARGE NOTE ADULT - CARE PLAN
Principal Discharge DX:	Fall, initial encounter  Goal:	patient appropriate for rehab therapy  Assessment and plan of treatment:	f/u outpatient w/ in one week of discharge

## 2018-07-11 NOTE — DISCHARGE NOTE ADULT - HOSPITAL COURSE
Vital Signs Last 24 Hrs  T(C): 36.5 (11 Jul 2018 11:20), Max: 36.7 (10 Jul 2018 16:01)  T(F): 97.7 (11 Jul 2018 11:20), Max: 98 (10 Jul 2018 16:01)  HR: 88 (11 Jul 2018 11:20) (69 - 88)  BP: 85/50 (11 Jul 2018 11:20) (85/50 - 153/51)  BP(mean): --  RR: 18 (11 Jul 2018 11:20) (18 - 18)  SpO2: 94% (11 Jul 2018 11:20) (92% - 96%)    HEENT:   pupils equal and reactive, EOMI, no oropharyngeal lesions, erythema, exudates, oral thrush    NECK:   supple, no carotid bruits, no palpable lymph nodes, no thyromegaly    CV:  +S1, +S2, regular, no murmurs or rubs    RESP:   lungs clear to auscultation bilaterally, no wheezing, rales, rhonchi, good air entry bilaterally    BREAST:  not examined    GI:  abdomen soft, non-tender, non-distended, normal BS, no bruits, no abdominal masses, no palpable masses    RECTAL:  not examined    :  not examined    MSK:   normal muscle tone, no atrophy, no rigidity, no contractions    EXT:   no clubbing, no cyanosis, no edema, no calf pain, swelling or erythema    VASCULAR:  pulses equal and symmetric in the upper and lower extremities    NEURO:  AAOX3, no focal neurological deficits, follows all commands, able to move extremities spontaneously    SKIN:  no ulcers, lesions or rashes    11 Jul 2018 07:28    142    |  106    |  19     ----------------------------<  93     4.5     |  27     |  0.99     Ca    8.9        11 Jul 2018 07:28        87 yo F with PMH significant for HTN, Hypothyroid presents to the ED s/p fall at home.    # S/p Mechanical fall at home  -was given gentle fluids; CK now resolved    # Dehydration/ANDREZ? ( No past EMR -Serum. Cr to comapre)  -now resolved with iv flu ids    # UTI  # Tachycardia/ Elevated Lactate 2.6 on admission/UA indicative of UTI  -urine culture was not sent by ER; patient was empirically treated for probable uti for3 days iv ceftriaxone      # CTAP incidental findings: 2.2 cm abnormal mass in the partially calcified mesenteric lesions.  -patient is elderly female whom has  no complaints what so ever related to her abdomen. This is an incidental finding in an elderly female  -recco no further intervention. patient is a poor candidate for surgery w/ recent history of falls, and decompensation  -will fax results to the primary care doctor to monitor its progression; repeat CT in few months.  -her PCP is Dr. Madelaine Zayas; will fax results

## 2018-07-14 LAB
CULTURE RESULTS: SIGNIFICANT CHANGE UP
SPECIMEN SOURCE: SIGNIFICANT CHANGE UP

## 2018-07-16 DIAGNOSIS — Z88.2 ALLERGY STATUS TO SULFONAMIDES: ICD-10-CM

## 2018-07-16 DIAGNOSIS — R00.0 TACHYCARDIA, UNSPECIFIED: ICD-10-CM

## 2018-07-16 DIAGNOSIS — E86.0 DEHYDRATION: ICD-10-CM

## 2018-07-16 DIAGNOSIS — N39.0 URINARY TRACT INFECTION, SITE NOT SPECIFIED: ICD-10-CM

## 2018-07-16 DIAGNOSIS — K44.9 DIAPHRAGMATIC HERNIA WITHOUT OBSTRUCTION OR GANGRENE: ICD-10-CM

## 2018-07-16 DIAGNOSIS — E03.9 HYPOTHYROIDISM, UNSPECIFIED: ICD-10-CM

## 2018-07-16 DIAGNOSIS — I12.9 HYPERTENSIVE CHRONIC KIDNEY DISEASE WITH STAGE 1 THROUGH STAGE 4 CHRONIC KIDNEY DISEASE, OR UNSPECIFIED CHRONIC KIDNEY DISEASE: ICD-10-CM

## 2018-07-16 DIAGNOSIS — N17.9 ACUTE KIDNEY FAILURE, UNSPECIFIED: ICD-10-CM

## 2018-07-16 DIAGNOSIS — N18.9 CHRONIC KIDNEY DISEASE, UNSPECIFIED: ICD-10-CM

## 2019-08-04 ENCOUNTER — EMERGENCY (EMERGENCY)
Facility: HOSPITAL | Age: 84
LOS: 0 days | Discharge: HOME | End: 2019-08-04
Payer: MEDICARE

## 2019-08-04 DIAGNOSIS — Z90.49 ACQUIRED ABSENCE OF OTHER SPECIFIED PARTS OF DIGESTIVE TRACT: Chronic | ICD-10-CM

## 2019-08-04 DIAGNOSIS — M54.9 DORSALGIA, UNSPECIFIED: ICD-10-CM
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
## 2019-08-04 DIAGNOSIS — Z88.2 ALLERGY STATUS TO SULFONAMIDES: ICD-10-CM

## 2019-08-04 DIAGNOSIS — M62.830 MUSCLE SPASM OF BACK: ICD-10-CM
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
## 2019-08-04 DIAGNOSIS — N39.0 URINARY TRACT INFECTION, SITE NOT SPECIFIED: ICD-10-CM

## 2019-08-04 PROCEDURE — 87086 URINE CULTURE/COLONY COUNT: CPT

## 2019-08-04 PROCEDURE — 81001 URINALYSIS AUTO W/SCOPE: CPT

## 2019-08-04 PROCEDURE — 36415 COLL VENOUS BLD VENIPUNCTURE: CPT

## 2019-08-04 PROCEDURE — 96374 THER/PROPH/DIAG INJ IV PUSH: CPT

## 2019-08-04 PROCEDURE — 99284 EMERGENCY DEPT VISIT MOD MDM: CPT | Mod: 25

## 2019-08-04 PROCEDURE — 99284 EMERGENCY DEPT VISIT MOD MDM: CPT

## 2019-08-04 PROCEDURE — 85025 COMPLETE CBC W/AUTO DIFF WBC: CPT

## 2019-08-04 PROCEDURE — 96372 THER/PROPH/DIAG INJ SC/IM: CPT | Mod: XU

## 2019-08-04 PROCEDURE — 80053 COMPREHEN METABOLIC PANEL: CPT

## 2019-08-05 PROBLEM — E03.9 HYPOTHYROIDISM, UNSPECIFIED: Chronic | Status: ACTIVE | Noted: 2018-07-08

## 2019-09-14 ENCOUNTER — INPATIENT (INPATIENT)
Facility: HOSPITAL | Age: 84
LOS: 3 days | Discharge: NURSING, REHAB OR LONG-TERM CARE | DRG: 542 | End: 2019-09-18
Attending: INTERNAL MEDICINE | Admitting: INTERNAL MEDICINE
Payer: MEDICARE

## 2019-09-14 VITALS
HEIGHT: 61 IN | WEIGHT: 149.91 LBS | SYSTOLIC BLOOD PRESSURE: 145 MMHG | TEMPERATURE: 97 F | DIASTOLIC BLOOD PRESSURE: 61 MMHG | HEART RATE: 88 BPM | OXYGEN SATURATION: 94 % | RESPIRATION RATE: 18 BRPM

## 2019-09-14 DIAGNOSIS — Z90.49 ACQUIRED ABSENCE OF OTHER SPECIFIED PARTS OF DIGESTIVE TRACT: Chronic | ICD-10-CM

## 2019-09-14 DIAGNOSIS — J18.1 LOBAR PNEUMONIA, UNSPECIFIED ORGANISM: ICD-10-CM
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
## 2019-09-14 DIAGNOSIS — M54.9 DORSALGIA, UNSPECIFIED: ICD-10-CM

## 2019-09-14 DIAGNOSIS — M81.0 AGE-RELATED OSTEOPOROSIS WITHOUT CURRENT PATHOLOGICAL FRACTURE: ICD-10-CM

## 2019-09-14 DIAGNOSIS — J98.11 ATELECTASIS: ICD-10-CM

## 2019-09-14 DIAGNOSIS — M48.54XA COLLAPSED VERTEBRA, NOT ELSEWHERE CLASSIFIED, THORACIC REGION, INITIAL ENCOUNTER FOR FRACTURE: ICD-10-CM

## 2019-09-14 DIAGNOSIS — I10 ESSENTIAL (PRIMARY) HYPERTENSION: ICD-10-CM
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
## 2019-09-14 DIAGNOSIS — E03.9 HYPOTHYROIDISM, UNSPECIFIED: ICD-10-CM

## 2019-09-14 DIAGNOSIS — N39.0 URINARY TRACT INFECTION, SITE NOT SPECIFIED: ICD-10-CM

## 2019-09-14 DIAGNOSIS — Z88.8 ALLERGY STATUS TO OTHER DRUGS, MEDICAMENTS AND BIOLOGICAL SUBSTANCES STATUS: ICD-10-CM

## 2019-09-14 DIAGNOSIS — Z88.2 ALLERGY STATUS TO SULFONAMIDES: ICD-10-CM

## 2019-09-14 LAB
ALBUMIN SERPL ELPH-MCNC: 3.3 G/DL — SIGNIFICANT CHANGE UP (ref 3.3–5)
ALP SERPL-CCNC: 86 U/L — SIGNIFICANT CHANGE UP (ref 40–120)
ALT FLD-CCNC: 16 U/L — SIGNIFICANT CHANGE UP (ref 12–78)
ANION GAP SERPL CALC-SCNC: 8 MMOL/L — SIGNIFICANT CHANGE UP (ref 5–17)
APPEARANCE UR: CLEAR — SIGNIFICANT CHANGE UP
AST SERPL-CCNC: 19 U/L — SIGNIFICANT CHANGE UP (ref 15–37)
BASOPHILS # BLD AUTO: 0.03 K/UL — SIGNIFICANT CHANGE UP (ref 0–0.2)
BASOPHILS NFR BLD AUTO: 0.5 % — SIGNIFICANT CHANGE UP (ref 0–2)
BILIRUB SERPL-MCNC: 0.5 MG/DL — SIGNIFICANT CHANGE UP (ref 0.2–1.2)
BILIRUB UR-MCNC: NEGATIVE — SIGNIFICANT CHANGE UP
BUN SERPL-MCNC: 32 MG/DL — HIGH (ref 7–23)
CALCIUM SERPL-MCNC: 9.6 MG/DL — SIGNIFICANT CHANGE UP (ref 8.5–10.1)
CHLORIDE SERPL-SCNC: 108 MMOL/L — SIGNIFICANT CHANGE UP (ref 96–108)
CO2 SERPL-SCNC: 26 MMOL/L — SIGNIFICANT CHANGE UP (ref 22–31)
COLOR SPEC: YELLOW — SIGNIFICANT CHANGE UP
CREAT SERPL-MCNC: 1.34 MG/DL — HIGH (ref 0.5–1.3)
DIFF PNL FLD: ABNORMAL
EOSINOPHIL # BLD AUTO: 0.26 K/UL — SIGNIFICANT CHANGE UP (ref 0–0.5)
EOSINOPHIL NFR BLD AUTO: 4.4 % — SIGNIFICANT CHANGE UP (ref 0–6)
GLUCOSE SERPL-MCNC: 98 MG/DL — SIGNIFICANT CHANGE UP (ref 70–99)
GLUCOSE UR QL: NEGATIVE MG/DL — SIGNIFICANT CHANGE UP
HCT VFR BLD CALC: 36.1 % — SIGNIFICANT CHANGE UP (ref 34.5–45)
HGB BLD-MCNC: 12.1 G/DL — SIGNIFICANT CHANGE UP (ref 11.5–15.5)
IMM GRANULOCYTES NFR BLD AUTO: 0.3 % — SIGNIFICANT CHANGE UP (ref 0–1.5)
KETONES UR-MCNC: ABNORMAL
LACTATE SERPL-SCNC: 0.6 MMOL/L — LOW (ref 0.7–2)
LEUKOCYTE ESTERASE UR-ACNC: ABNORMAL
LYMPHOCYTES # BLD AUTO: 1.32 K/UL — SIGNIFICANT CHANGE UP (ref 1–3.3)
LYMPHOCYTES # BLD AUTO: 22.2 % — SIGNIFICANT CHANGE UP (ref 13–44)
MCHC RBC-ENTMCNC: 29.6 PG — SIGNIFICANT CHANGE UP (ref 27–34)
MCHC RBC-ENTMCNC: 33.5 GM/DL — SIGNIFICANT CHANGE UP (ref 32–36)
MCV RBC AUTO: 88.3 FL — SIGNIFICANT CHANGE UP (ref 80–100)
MONOCYTES # BLD AUTO: 0.63 K/UL — SIGNIFICANT CHANGE UP (ref 0–0.9)
MONOCYTES NFR BLD AUTO: 10.6 % — SIGNIFICANT CHANGE UP (ref 2–14)
NEUTROPHILS # BLD AUTO: 3.68 K/UL — SIGNIFICANT CHANGE UP (ref 1.8–7.4)
NEUTROPHILS NFR BLD AUTO: 62 % — SIGNIFICANT CHANGE UP (ref 43–77)
NITRITE UR-MCNC: NEGATIVE — SIGNIFICANT CHANGE UP
PH UR: 6 — SIGNIFICANT CHANGE UP (ref 5–8)
PLATELET # BLD AUTO: 247 K/UL — SIGNIFICANT CHANGE UP (ref 150–400)
POTASSIUM SERPL-MCNC: 5 MMOL/L — SIGNIFICANT CHANGE UP (ref 3.5–5.3)
POTASSIUM SERPL-SCNC: 5 MMOL/L — SIGNIFICANT CHANGE UP (ref 3.5–5.3)
PROT SERPL-MCNC: 6.3 GM/DL — SIGNIFICANT CHANGE UP (ref 6–8.3)
PROT UR-MCNC: 30 MG/DL
RBC # BLD: 4.09 M/UL — SIGNIFICANT CHANGE UP (ref 3.8–5.2)
RBC # FLD: 13.1 % — SIGNIFICANT CHANGE UP (ref 10.3–14.5)
SODIUM SERPL-SCNC: 142 MMOL/L — SIGNIFICANT CHANGE UP (ref 135–145)
SP GR SPEC: 1.01 — SIGNIFICANT CHANGE UP (ref 1.01–1.02)
UROBILINOGEN FLD QL: NEGATIVE MG/DL — SIGNIFICANT CHANGE UP
WBC # BLD: 5.94 K/UL — SIGNIFICANT CHANGE UP (ref 3.8–10.5)
WBC # FLD AUTO: 5.94 K/UL — SIGNIFICANT CHANGE UP (ref 3.8–10.5)

## 2019-09-14 PROCEDURE — 80048 BASIC METABOLIC PNL TOTAL CA: CPT

## 2019-09-14 PROCEDURE — 74176 CT ABD & PELVIS W/O CONTRAST: CPT

## 2019-09-14 PROCEDURE — 97162 PT EVAL MOD COMPLEX 30 MIN: CPT | Mod: GP

## 2019-09-14 PROCEDURE — 74176 CT ABD & PELVIS W/O CONTRAST: CPT | Mod: 26

## 2019-09-14 PROCEDURE — 71250 CT THORAX DX C-: CPT

## 2019-09-14 PROCEDURE — 71250 CT THORAX DX C-: CPT | Mod: 26

## 2019-09-14 PROCEDURE — 93010 ELECTROCARDIOGRAM REPORT: CPT

## 2019-09-14 PROCEDURE — 83605 ASSAY OF LACTIC ACID: CPT

## 2019-09-14 PROCEDURE — 97530 THERAPEUTIC ACTIVITIES: CPT | Mod: GP

## 2019-09-14 PROCEDURE — 87040 BLOOD CULTURE FOR BACTERIA: CPT

## 2019-09-14 PROCEDURE — 93005 ELECTROCARDIOGRAM TRACING: CPT

## 2019-09-14 PROCEDURE — 71045 X-RAY EXAM CHEST 1 VIEW: CPT | Mod: 26

## 2019-09-14 PROCEDURE — 36415 COLL VENOUS BLD VENIPUNCTURE: CPT

## 2019-09-14 PROCEDURE — 97116 GAIT TRAINING THERAPY: CPT | Mod: GP

## 2019-09-14 RX ORDER — BUPROPION HYDROCHLORIDE 150 MG/1
150 TABLET, EXTENDED RELEASE ORAL DAILY
Refills: 0 | Status: DISCONTINUED | OUTPATIENT
Start: 2019-09-14 | End: 2019-09-18

## 2019-09-14 RX ORDER — SODIUM CHLORIDE 9 MG/ML
500 INJECTION INTRAMUSCULAR; INTRAVENOUS; SUBCUTANEOUS ONCE
Refills: 0 | Status: COMPLETED | OUTPATIENT
Start: 2019-09-14 | End: 2019-09-14

## 2019-09-14 RX ORDER — SPIRONOLACTONE 25 MG/1
25 TABLET, FILM COATED ORAL DAILY
Refills: 0 | Status: DISCONTINUED | OUTPATIENT
Start: 2019-09-14 | End: 2019-09-18

## 2019-09-14 RX ORDER — CITALOPRAM 10 MG/1
1 TABLET, FILM COATED ORAL
Qty: 0 | Refills: 0 | DISCHARGE

## 2019-09-14 RX ORDER — LISINOPRIL 2.5 MG/1
5 TABLET ORAL DAILY
Refills: 0 | Status: DISCONTINUED | OUTPATIENT
Start: 2019-09-14 | End: 2019-09-18

## 2019-09-14 RX ORDER — ACETAMINOPHEN 500 MG
1000 TABLET ORAL ONCE
Refills: 0 | Status: COMPLETED | OUTPATIENT
Start: 2019-09-14 | End: 2019-09-15

## 2019-09-14 RX ORDER — MORPHINE SULFATE 50 MG/1
2 CAPSULE, EXTENDED RELEASE ORAL ONCE
Refills: 0 | Status: DISCONTINUED | OUTPATIENT
Start: 2019-09-14 | End: 2019-09-14

## 2019-09-14 RX ORDER — HEPARIN SODIUM 5000 [USP'U]/ML
5000 INJECTION INTRAVENOUS; SUBCUTANEOUS EVERY 8 HOURS
Refills: 0 | Status: DISCONTINUED | OUTPATIENT
Start: 2019-09-14 | End: 2019-09-18

## 2019-09-14 RX ORDER — ONDANSETRON 8 MG/1
4 TABLET, FILM COATED ORAL EVERY 6 HOURS
Refills: 0 | Status: DISCONTINUED | OUTPATIENT
Start: 2019-09-14 | End: 2019-09-18

## 2019-09-14 RX ORDER — CEFTRIAXONE 500 MG/1
1000 INJECTION, POWDER, FOR SOLUTION INTRAMUSCULAR; INTRAVENOUS EVERY 24 HOURS
Refills: 0 | Status: DISCONTINUED | OUTPATIENT
Start: 2019-09-14 | End: 2019-09-18

## 2019-09-14 RX ORDER — CALCITONIN SALMON 200 [IU]/ML
1 INJECTION, SOLUTION INTRAMUSCULAR DAILY
Refills: 0 | Status: DISCONTINUED | OUTPATIENT
Start: 2019-09-14 | End: 2019-09-18

## 2019-09-14 RX ORDER — LIDOCAINE 4 G/100G
1 CREAM TOPICAL DAILY
Refills: 0 | Status: DISCONTINUED | OUTPATIENT
Start: 2019-09-14 | End: 2019-09-18

## 2019-09-14 RX ORDER — SODIUM CHLORIDE 9 MG/ML
1000 INJECTION INTRAMUSCULAR; INTRAVENOUS; SUBCUTANEOUS ONCE
Refills: 0 | Status: COMPLETED | OUTPATIENT
Start: 2019-09-14 | End: 2019-09-14

## 2019-09-14 RX ORDER — FUROSEMIDE 40 MG
20 TABLET ORAL DAILY
Refills: 0 | Status: DISCONTINUED | OUTPATIENT
Start: 2019-09-14 | End: 2019-09-18

## 2019-09-14 RX ORDER — ACETAMINOPHEN 500 MG
650 TABLET ORAL EVERY 6 HOURS
Refills: 0 | Status: DISCONTINUED | OUTPATIENT
Start: 2019-09-14 | End: 2019-09-18

## 2019-09-14 RX ORDER — AZITHROMYCIN 500 MG/1
500 TABLET, FILM COATED ORAL ONCE
Refills: 0 | Status: COMPLETED | OUTPATIENT
Start: 2019-09-14 | End: 2019-09-14

## 2019-09-14 RX ORDER — THYROID 120 MG
30 TABLET ORAL DAILY
Refills: 0 | Status: DISCONTINUED | OUTPATIENT
Start: 2019-09-14 | End: 2019-09-18

## 2019-09-14 RX ORDER — CEFTRIAXONE 500 MG/1
1000 INJECTION, POWDER, FOR SOLUTION INTRAMUSCULAR; INTRAVENOUS ONCE
Refills: 0 | Status: COMPLETED | OUTPATIENT
Start: 2019-09-14 | End: 2019-09-14

## 2019-09-14 RX ORDER — ASPIRIN/CALCIUM CARB/MAGNESIUM 324 MG
81 TABLET ORAL DAILY
Refills: 0 | Status: DISCONTINUED | OUTPATIENT
Start: 2019-09-14 | End: 2019-09-18

## 2019-09-14 RX ORDER — IBUPROFEN 200 MG
400 TABLET ORAL EVERY 8 HOURS
Refills: 0 | Status: DISCONTINUED | OUTPATIENT
Start: 2019-09-14 | End: 2019-09-18

## 2019-09-14 RX ORDER — ONDANSETRON 8 MG/1
4 TABLET, FILM COATED ORAL ONCE
Refills: 0 | Status: COMPLETED | OUTPATIENT
Start: 2019-09-14 | End: 2019-09-14

## 2019-09-14 RX ORDER — SENNA PLUS 8.6 MG/1
2 TABLET ORAL AT BEDTIME
Refills: 0 | Status: DISCONTINUED | OUTPATIENT
Start: 2019-09-14 | End: 2019-09-18

## 2019-09-14 RX ORDER — AZITHROMYCIN 500 MG/1
500 TABLET, FILM COATED ORAL DAILY
Refills: 0 | Status: DISCONTINUED | OUTPATIENT
Start: 2019-09-15 | End: 2019-09-18

## 2019-09-14 RX ORDER — THYROID 120 MG
60 TABLET ORAL DAILY
Refills: 0 | Status: DISCONTINUED | OUTPATIENT
Start: 2019-09-14 | End: 2019-09-18

## 2019-09-14 RX ORDER — CYCLOBENZAPRINE HYDROCHLORIDE 10 MG/1
5 TABLET, FILM COATED ORAL ONCE
Refills: 0 | Status: COMPLETED | OUTPATIENT
Start: 2019-09-14 | End: 2019-09-14

## 2019-09-14 RX ADMIN — BUPROPION HYDROCHLORIDE 150 MILLIGRAM(S): 150 TABLET, EXTENDED RELEASE ORAL at 14:46

## 2019-09-14 RX ADMIN — Medication 400 MILLIGRAM(S): at 17:00

## 2019-09-14 RX ADMIN — CEFTRIAXONE 1000 MILLIGRAM(S): 500 INJECTION, POWDER, FOR SOLUTION INTRAMUSCULAR; INTRAVENOUS at 11:43

## 2019-09-14 RX ADMIN — Medication 20 MILLIGRAM(S): at 14:47

## 2019-09-14 RX ADMIN — Medication 30 MILLIGRAM(S): at 14:46

## 2019-09-14 RX ADMIN — CYCLOBENZAPRINE HYDROCHLORIDE 5 MILLIGRAM(S): 10 TABLET, FILM COATED ORAL at 09:27

## 2019-09-14 RX ADMIN — LISINOPRIL 5 MILLIGRAM(S): 2.5 TABLET ORAL at 14:46

## 2019-09-14 RX ADMIN — MORPHINE SULFATE 2 MILLIGRAM(S): 50 CAPSULE, EXTENDED RELEASE ORAL at 09:29

## 2019-09-14 RX ADMIN — Medication 81 MILLIGRAM(S): at 14:47

## 2019-09-14 RX ADMIN — LIDOCAINE 1 PATCH: 4 CREAM TOPICAL at 14:46

## 2019-09-14 RX ADMIN — MORPHINE SULFATE 2 MILLIGRAM(S): 50 CAPSULE, EXTENDED RELEASE ORAL at 09:14

## 2019-09-14 RX ADMIN — HEPARIN SODIUM 5000 UNIT(S): 5000 INJECTION INTRAVENOUS; SUBCUTANEOUS at 14:47

## 2019-09-14 RX ADMIN — AZITHROMYCIN 255 MILLIGRAM(S): 500 TABLET, FILM COATED ORAL at 11:45

## 2019-09-14 RX ADMIN — LIDOCAINE 1 PATCH: 4 CREAM TOPICAL at 17:00

## 2019-09-14 RX ADMIN — MORPHINE SULFATE 2 MILLIGRAM(S): 50 CAPSULE, EXTENDED RELEASE ORAL at 11:41

## 2019-09-14 RX ADMIN — Medication 400 MILLIGRAM(S): at 15:24

## 2019-09-14 RX ADMIN — Medication 60 MILLIGRAM(S): at 14:46

## 2019-09-14 RX ADMIN — SPIRONOLACTONE 25 MILLIGRAM(S): 25 TABLET, FILM COATED ORAL at 14:47

## 2019-09-14 RX ADMIN — ONDANSETRON 4 MILLIGRAM(S): 8 TABLET, FILM COATED ORAL at 09:14

## 2019-09-14 RX ADMIN — SODIUM CHLORIDE 500 MILLILITER(S): 9 INJECTION INTRAMUSCULAR; INTRAVENOUS; SUBCUTANEOUS at 09:13

## 2019-09-14 RX ADMIN — SODIUM CHLORIDE 666.67 MILLILITER(S): 9 INJECTION INTRAMUSCULAR; INTRAVENOUS; SUBCUTANEOUS at 11:45

## 2019-09-14 NOTE — ED PROVIDER NOTE - PROGRESS NOTE DETAILS
Rosita AS for Dr. Holloway: family at bedside: pt lives alone, no HHA, recently unable to perform ADL's due to worsening of back pain, poorly ambulatory even with walker assistance, family in process of trying to schedule outpatient spine referral but no appointment has yet been rendered, will involve case management/SW for possible placement/rehab and discuss with spine

## 2019-09-14 NOTE — H&P ADULT - ASSESSMENT
87/F with PMHx of HTN, hypothyroidism, Led edema on diuretics, upper back pain s/p reported fall last month on 8/4/2019, was seen in  ED with CT LS spine showing acute comp fx of T6 , admitted with     1) Worsening Upper Back Pain: likely sec to T6 Fx  admit  f/u CT LS spine  spine sx consult  PT consult  tylenol + lidocaine patch; pt allergic to opiates  family to bring MRI report    2) LLL PNA: CAP  continue ceftriaxone  + po zithro  f/u blood cx    3) ? UTI: had similar UA last month but urine cx grew normal urogenital abi  f/u urine cx  cont Ceftriaxone for now    4) HTN: cont home meds    5) Mildly increased Cr 1.34:   likely sec to diuretics at home for leg edema given by Dr. Mike Garg  monitor     6) Hx of leg edema: cont diuretics  f/u BMP    7) Hypothyroidism: cont [place] thyroid    8) DVT PPX: heparin s/q    poc discussed with pt, sons, daughter in law, team

## 2019-09-14 NOTE — ED PROVIDER NOTE - MUSCULOSKELETAL, MLM
Spine appears normal, range of motion is not limited. + mid back midline tenderness w/o vertebral step off deformity. b/l SLR was 20 degrees w/o pain

## 2019-09-14 NOTE — ED ADULT NURSE NOTE - OBJECTIVE STATEMENT
A&Ox3, patient comes in with back pain. patient denies trauma. patient had recent mri that showed fracture. no signs of acute distress noted.

## 2019-09-14 NOTE — ED ADULT NURSE NOTE - NSIMPLEMENTINTERV_GEN_ALL_ED
Implemented All Fall with Harm Risk Interventions:  [place] to call system. Call bell, personal items and telephone within reach. Instruct patient to call for assistance. Room bathroom lighting operational. Non-slip footwear when patient is off stretcher. Physically safe environment: no spills, clutter or unnecessary equipment. Stretcher in lowest position, wheels locked, appropriate side rails in place. Provide visual cue, wrist band, yellow gown, etc. Monitor gait and stability. Monitor for mental status changes and reorient to person, place, and time. Review medications for side effects contributing to fall risk. Reinforce activity limits and safety measures with patient and family. Provide visual clues: red socks.

## 2019-09-14 NOTE — H&P ADULT - NSHPLABSRESULTS_GEN_ALL_CORE
12.1   5.94  )-----------( 247      ( 14 Sep 2019 08:33 )             36.1       CBC Full  -  ( 14 Sep 2019 08:33 )  WBC Count : 5.94 K/uL  RBC Count : 4.09 M/uL  Hemoglobin : 12.1 g/dL  Hematocrit : 36.1 %  Platelet Count - Automated : 247 K/uL  Mean Cell Volume : 88.3 fl  Mean Cell Hemoglobin : 29.6 pg  Mean Cell Hemoglobin Concentration : 33.5 gm/dL  Auto Neutrophil # : 3.68 K/uL  Auto Lymphocyte # : 1.32 K/uL  Auto Monocyte # : 0.63 K/uL  Auto Eosinophil # : 0.26 K/uL  Auto Basophil # : 0.03 K/uL  Auto Neutrophil % : 62.0 %  Auto Lymphocyte % : 22.2 %  Auto Monocyte % : 10.6 %  Auto Eosinophil % : 4.4 %  Auto Basophil % : 0.5 %          142  |  108  |  32<H>  ----------------------------<  98  5.0   |  26  |  1.34<H>    Ca    9.6      14 Sep 2019 08:33    TPro  6.3  /  Alb  3.3  /  TBili  0.5  /  DBili  x   /  AST  19  /  ALT  16  /  AlkPhos  86  09-14      LIVER FUNCTIONS - ( 14 Sep 2019 08:33 )  Alb: 3.3 g/dL / Pro: 6.3 gm/dL / ALK PHOS: 86 U/L / ALT: 16 U/L / AST: 19 U/L / GGT: x             PT/INR - ( 14 Sep 2019 09:57 )   PT: 12.0 sec;   INR: 1.08 ratio         PTT - ( 14 Sep 2019 09:57 )  PTT:28.4 sec          Urinalysis Basic - ( 14 Sep 2019 09:14 )    Color: Yellow / Appearance: Clear / S.010 / pH: x  Gluc: x / Ketone: Trace  / Bili: Negative / Urobili: Negative mg/dL   Blood: x / Protein: 30 mg/dL / Nitrite: Negative   Leuk Esterase: Moderate / RBC: 0-2 /HPF / WBC 26-50   Sq Epi: x / Non Sq Epi: Moderate / Bacteria: Few            MEDICATIONS  (STANDING):  aspirin  chewable 81 milliGRAM(s) Oral daily  buPROPion XL . 150 milliGRAM(s) Oral daily  cefTRIAXone Injectable. 1000 milliGRAM(s) IV Push every 24 hours  furosemide    Tablet 20 milliGRAM(s) Oral daily  heparin  Injectable 5000 Unit(s) SubCutaneous every 8 hours  lidocaine   Patch 1 Patch Transdermal daily  lisinopril 5 milliGRAM(s) Oral daily  spironolactone 25 milliGRAM(s) Oral daily

## 2019-09-14 NOTE — H&P ADULT - NSHPPHYSICALEXAM_GEN_ALL_CORE
PHYSICAL EXAM:    Daily Height in cm: 154.94 (14 Sep 2019 07:23)      T(C): 36.4 (14 Sep 2019 11:46), Max: 36.4 (14 Sep 2019 11:46)  T(F): 97.5 (14 Sep 2019 11:46), Max: 97.5 (14 Sep 2019 11:46)  HR: 90 (14 Sep 2019 11:46) (88 - 90)  BP: 124/64 (14 Sep 2019 11:46) (124/64 - 145/61)  BP(mean): --  ABP: --  ABP(mean): --  RR: 18 (14 Sep 2019 11:46) (18 - 18)  SpO2: 98% (14 Sep 2019 11:46) (94% - 98%)      Constitutional: Well appearing  HEENT: Atraumatic, RAYMOND, Normal, No congestion  Respiratory: Breath Sounds normal, no rhonchi/wheeze  Cardiovascular: N S1S2;   Gastrointestinal: Abdomen soft, non tender, Bowel Sounds present  Extremities: No edema, peripheral pulses present  Neurological: AAO x 3, no gross focal motor deficits  Skin: Non cellulitic, no rash, ulcers  Lymph Nodes: No lymphadenopathy noted  Back: No CVA tenderness   Musculoskeletal: non tender  Breasts: Deferred  Genitourinary: deferred  Rectal: Deferred

## 2019-09-14 NOTE — ED PROVIDER NOTE - OBJECTIVE STATEMENT
88 yo WF, h/o HTN, hypothyroid, upper back pain s/p reported fall couple weeks ago with 8/4/2019  ED CT TLS spine + acute comp fx T6, BIBA from home c/o'ing worsening pain past 1 - 2 dd., no recent fall/ injury.  Pt poorly able to move around, perform ADLs due to the pain.

## 2019-09-14 NOTE — CONSULT NOTE ADULT - ASSESSMENT
87F with worsening back pain s/p vertebral compression fracture on 8/4/2019    Likely pain control issue as there is only mild progression of T6 vertebral fracture  No red flag symptoms   NTTP over the bony prominences of the spine  Neurovascularly intact with normal reflexes    -Pain control,   -Will add miacaclin nasal spray qD, alternate nostrils  -No indication for brace as level of fracture is too high  -Recommend MRI if pain does not improve   -Will reassess tomorrow   -Discussed with attending, Dr. Nayak, who agrees with plan, will advise if plan changes 87F with worsening back pain s/p vertebral compression fracture on 8/4/2019    Likely pain control issue as there is only mild progression of T6 vertebral fracture  No red flag symptoms   NTTP over the bony prominences of the spine  Neurovascularly intact with normal reflexes    -Pain control,   -Will add miacaclin nasal spray qD, alternate nostrils each day  -No indication for brace as level of fracture is too high  -Recommend MRI if pain does not improve   -Will reassess tomorrow   -DVT ppx per primary team  -Medical management per primary team  -Discussed with attending, Dr. Nayak, who agrees with plan, will advise if plan changes 87F with worsening back pain s/p vertebral compression fracture on 8/4/2019    Mild progression of T6 vertebral compression fracture from prior imaging  No evidence of acute cord compression  No red flag symptoms  Patient is nvi with mild tenderness to palpation on exam  Ambulation likely pain control issue  Recommend pain control, if pain does not improve, may benefit from repeat MRI as inpatient if unable to obtain [place] imaging    -Pain control,   -Will add miacaclin nasal spray qD, alternate nostrils each day  -WBAT BLLE, no indication for brace as level of fracture is too high  -Recommend MRI if pain does not improve   -Will reassess tomorrow   -DVT ppx per primary team  -Medical management per primary team  -Discussed with attending, Dr. Nayak, who agrees with plan, will advise if plan changes  -Ortho to follow

## 2019-09-14 NOTE — ED PROVIDER NOTE - CARE PLAN
Principal Discharge DX:	Upper back pain  Secondary Diagnosis:	CAP (community acquired pneumonia)  Secondary Diagnosis:	Acute cystitis without hematuria

## 2019-09-14 NOTE — ED PROVIDER NOTE - CONSTITUTIONAL, MLM
normal... Overweight elderly female Well appearing, awake, alert, oriented to person, place, time/situation. +in moderate distress due to pain

## 2019-09-14 NOTE — ED PROVIDER NOTE - CLINICAL SUMMARY MEDICAL DECISION MAKING FREE TEXT BOX
86 yo WF, h/o HTN, hypothyroid, upper back pain s/p reported fall couple weeks ago with 8/4/2019  ED CT TLS spine + acute comp fx T6, BIBA from home c/o'ing worsening pain past 1 - 2 dd., no recent fall/ injury.  Pt poorly able to move around, perform ADLs due to the pain.  Plan: 86 yo WF, h/o HTN, hypothyroid, upper back pain s/p reported fall couple weeks ago with 8/4/2019  ED CT TLS spine + acute comp fx T6, BIBA from home c/o'ing worsening pain past 1 - 2 dd., no recent fall/ injury.  Pt poorly able to move around, perform ADLs due to the pain.  Plan:  EKG, CXR, labs, IV pain med, d/w family & Case Management.  CXR + infilt, U/A suggestive of UTI.  Case d/w case management: advises Med. admission.  BCs, Urine C+S, Abx IV ordered.  MRI reports placed in ER folder & MRI disc submitted to Radiology  to be uploaded.

## 2019-09-14 NOTE — H&P ADULT - HISTORY OF PRESENT ILLNESS
87/F with PMHx of HTN, hypothyroidism, Led edema on diuretics, upper back pain s/p reported fall last month on 8/4/2019, was seen in  ED with CT LS spine showing acute comp fx of T6 was  brought  from home for c/o  worsening upper back pain for the last 1 - 2 days. Pt says that she can't do much d/t pain. She lives alone. No recent fall/ injury. Had MRI upper back done outside about a week ago, no reports.     Pt has no bladder/bowel involvement.     CXR shows LLL infiltrate

## 2019-09-14 NOTE — ED ADULT TRIAGE NOTE - CHIEF COMPLAINT QUOTE
Pt had outpt MRI that showed fx.  Pt has appt for spinal specialist, but pt woke this am with severe pain.

## 2019-09-15 LAB
ANION GAP SERPL CALC-SCNC: 8 MMOL/L — SIGNIFICANT CHANGE UP (ref 5–17)
BUN SERPL-MCNC: 27 MG/DL — HIGH (ref 7–23)
CALCIUM SERPL-MCNC: 9 MG/DL — SIGNIFICANT CHANGE UP (ref 8.5–10.1)
CHLORIDE SERPL-SCNC: 108 MMOL/L — SIGNIFICANT CHANGE UP (ref 96–108)
CO2 SERPL-SCNC: 23 MMOL/L — SIGNIFICANT CHANGE UP (ref 22–31)
CREAT SERPL-MCNC: 1.29 MG/DL — SIGNIFICANT CHANGE UP (ref 0.5–1.3)
GLUCOSE SERPL-MCNC: 92 MG/DL — SIGNIFICANT CHANGE UP (ref 70–99)
POTASSIUM SERPL-MCNC: 5.1 MMOL/L — SIGNIFICANT CHANGE UP (ref 3.5–5.3)
POTASSIUM SERPL-SCNC: 5.1 MMOL/L — SIGNIFICANT CHANGE UP (ref 3.5–5.3)
SODIUM SERPL-SCNC: 139 MMOL/L — SIGNIFICANT CHANGE UP (ref 135–145)

## 2019-09-15 RX ORDER — MORPHINE SULFATE 50 MG/1
2 CAPSULE, EXTENDED RELEASE ORAL EVERY 4 HOURS
Refills: 0 | Status: DISCONTINUED | OUTPATIENT
Start: 2019-09-15 | End: 2019-09-18

## 2019-09-15 RX ORDER — MORPHINE SULFATE 50 MG/1
2 CAPSULE, EXTENDED RELEASE ORAL ONCE
Refills: 0 | Status: DISCONTINUED | OUTPATIENT
Start: 2019-09-15 | End: 2019-09-15

## 2019-09-15 RX ADMIN — MORPHINE SULFATE 2 MILLIGRAM(S): 50 CAPSULE, EXTENDED RELEASE ORAL at 03:12

## 2019-09-15 RX ADMIN — Medication 20 MILLIGRAM(S): at 11:26

## 2019-09-15 RX ADMIN — Medication 400 MILLIGRAM(S): at 00:00

## 2019-09-15 RX ADMIN — AZITHROMYCIN 500 MILLIGRAM(S): 500 TABLET, FILM COATED ORAL at 11:25

## 2019-09-15 RX ADMIN — Medication 30 MILLIGRAM(S): at 08:43

## 2019-09-15 RX ADMIN — LIDOCAINE 1 PATCH: 4 CREAM TOPICAL at 02:32

## 2019-09-15 RX ADMIN — LIDOCAINE 1 PATCH: 4 CREAM TOPICAL at 17:04

## 2019-09-15 RX ADMIN — Medication 400 MILLIGRAM(S): at 16:05

## 2019-09-15 RX ADMIN — Medication 400 MILLIGRAM(S): at 08:43

## 2019-09-15 RX ADMIN — Medication 400 MILLIGRAM(S): at 09:13

## 2019-09-15 RX ADMIN — LIDOCAINE 1 PATCH: 4 CREAM TOPICAL at 11:26

## 2019-09-15 RX ADMIN — BUPROPION HYDROCHLORIDE 150 MILLIGRAM(S): 150 TABLET, EXTENDED RELEASE ORAL at 11:25

## 2019-09-15 RX ADMIN — CALCITONIN SALMON 1 SPRAY(S): 200 INJECTION, SOLUTION INTRAMUSCULAR at 11:25

## 2019-09-15 RX ADMIN — Medication 60 MILLIGRAM(S): at 08:43

## 2019-09-15 RX ADMIN — SPIRONOLACTONE 25 MILLIGRAM(S): 25 TABLET, FILM COATED ORAL at 11:26

## 2019-09-15 RX ADMIN — MORPHINE SULFATE 2 MILLIGRAM(S): 50 CAPSULE, EXTENDED RELEASE ORAL at 23:17

## 2019-09-15 RX ADMIN — ONDANSETRON 4 MILLIGRAM(S): 8 TABLET, FILM COATED ORAL at 23:25

## 2019-09-15 RX ADMIN — Medication 81 MILLIGRAM(S): at 11:26

## 2019-09-15 RX ADMIN — CEFTRIAXONE 1000 MILLIGRAM(S): 500 INJECTION, POWDER, FOR SOLUTION INTRAMUSCULAR; INTRAVENOUS at 11:25

## 2019-09-15 NOTE — PROGRESS NOTE ADULT - SUBJECTIVE AND OBJECTIVE BOX
87/F with PMHx of HTN, hypothyroidism, Led edema on diuretics, upper back pain s/p reported fall last month on 2019, was seen in  ED with CT LS spine showing acute comp fx of T6 was  brought  from home for c/o  worsening upper back pain for the last 1 - 2 days. Pt says that she can't do much d/t pain. She lives alone. No recent fall/ injury. Had MRI upper back done outside about a week ago, no reports.     Pt has no bladder/bowel involvement.  CXR shows LLL infiltrate.        09/15/19: Patient seen and examined. She denies any new complaints.       Vital Signs Last 24 Hrs  T(C): 36.7 (15 Sep 2019 11:19), Max: 36.8 (15 Sep 2019 04:54)  T(F): 98 (15 Sep 2019 11:19), Max: 98.3 (15 Sep 2019 04:54)  HR: 93 (15 Sep 2019 11:19) (83 - 93)  BP: 102/56 (15 Sep 2019 11:19) (99/43 - 121/52)  BP(mean): --  RR: 20 (15 Sep 2019 11:19) (17 - 20)  SpO2: 96% (15 Sep 2019 11:19) (96% - 100%)        Physical Exam:         	Constitutional: Well appearing  	HEENT: Atraumatic, RAYMOND, Normal, No congestion  	Respiratory: Breath Sounds normal, no rhonchi/wheeze  	Cardiovascular: N S1S2;   	Gastrointestinal: Abdomen soft, non tender, Bowel Sounds present  	Extremities: No edema, peripheral pulses present  	Neurological: AAO x 3, no gross focal motor deficits  	Skin: Non cellulitic, no rash, ulcers  	Lymph Nodes: No lymphadenopathy noted  	Back: No CVA tenderness   	Musculoskeletal: non tender.          Labs:                            12.1   5.94  )-----------( 247      ( 14 Sep 2019 08:33 )             36.1     15 Sep 2019 08:31    139    |  108    |  27     ----------------------------<  92     5.1     |  23     |  1.29     Ca    9.0        15 Sep 2019 08:31    TPro  6.3    /  Alb  3.3    /  TBili  0.5    /  DBili  x      /  AST  19     /  ALT  16     /  AlkPhos  86     14 Sep 2019 08:33    LIVER FUNCTIONS - ( 14 Sep 2019 08:33 )  Alb: 3.3 g/dL / Pro: 6.3 gm/dL / ALK PHOS: 86 U/L / ALT: 16 U/L / AST: 19 U/L / GGT: x           PT/INR - ( 14 Sep 2019 09:57 )   PT: 12.0 sec;   INR: 1.08 ratio         PTT - ( 14 Sep 2019 09:57 )  PTT:28.4 sec  CAPILLARY BLOOD GLUCOSE            Urinalysis Basic - ( 14 Sep 2019 09:14 )    Color: Yellow / Appearance: Clear / S.010 / pH: x  Gluc: x / Ketone: Trace  / Bili: Negative / Urobili: Negative mg/dL   Blood: x / Protein: 30 mg/dL / Nitrite: Negative   Leuk Esterase: Moderate / RBC: 0-2 /HPF / WBC 26-50   Sq Epi: x / Non Sq Epi: Moderate / Bacteria: Few          MEDICATIONS  (STANDING):  aspirin  chewable 81 milliGRAM(s) Oral daily  azithromycin   Tablet 500 milliGRAM(s) Oral daily  buPROPion XL . 150 milliGRAM(s) Oral daily  calcitonin Nasal 1 Spray(s) Nasal daily  cefTRIAXone Injectable. 1000 milliGRAM(s) IV Push every 24 hours  furosemide    Tablet 20 milliGRAM(s) Oral daily  heparin  Injectable 5000 Unit(s) SubCutaneous every 8 hours  lidocaine   Patch 1 Patch Transdermal daily  lisinopril 5 milliGRAM(s) Oral daily  spironolactone 25 milliGRAM(s) Oral daily  thyroid 60 milliGRAM(s) Oral daily  thyroid 30 milliGRAM(s) Oral daily    MEDICATIONS  (PRN):  acetaminophen   Tablet .. 650 milliGRAM(s) Oral every 6 hours PRN Temp greater or equal to 38C (100.4F), Mild Pain (1 - 3)  ibuprofen  Tablet. 400 milliGRAM(s) Oral every 8 hours PRN Severe Pain (7 - 10)  morphine  - Injectable 2 milliGRAM(s) IV Push every 4 hours PRN Severe Pain (7 - 10)  ondansetron Injectable 4 milliGRAM(s) IV Push every 6 hours PRN Nausea  senna 2 Tablet(s) Oral at bedtime PRN Constipation            Assessment and Plan:   Assessment:  · Assessment		  87/F with PMHx of HTN, hypothyroidism, Led edema on diuretics, upper back pain s/p reported fall last month on 2019, was seen in  ED with CT LS spine showing acute comp fx of T6 , admitted with     1) Worsening Upper Back Pain: likely sec to T6 Fx    spine sx consult appreciated  PT consult  tylenol + lidocaine patch; pt allergic to opiates  family to bring MRI report  PT eval appreciated    2) LLL PNA: CAP  continue ceftriaxone  + po zithro  f/u blood cx    3) ? UTI: had similar UA last month but urine cx grew normal urogenital abi  f/u urine cx  cont Ceftriaxone for now    4) HTN: cont home meds    5) Mildly increased Cr 1.34:   likely sec to diuretics at home for leg edema given by Dr. Mike Garg  monitor     6) Hx of leg edema: cont diuretics  f/u BMP    7) Hypothyroidism: cont [place] thyroid    8) DVT PPX: heparin s/q

## 2019-09-15 NOTE — PROGRESS NOTE ADULT - SUBJECTIVE AND OBJECTIVE BOX
Patient seen and examined at bedside, resting comfortably. Denies headache, lightheadedness, dizziness, chest pain, dyspnea, n/v/d/c, numbness/tingling. Pain well controlled. No complaints at this time.      Vital Signs Last 24 Hrs  T(C): 36.8 (15 Sep 2019 04:54), Max: 36.8 (15 Sep 2019 04:54)  T(F): 98.3 (15 Sep 2019 04:54), Max: 98.3 (15 Sep 2019 04:54)  HR: 83 (15 Sep 2019 04:54) (83 - 90)  BP: 99/43 (15 Sep 2019 04:54) (99/43 - 124/64)  BP(mean): --  RR: 17 (15 Sep 2019 04:54) (17 - 18)  SpO2: 100% (15 Sep 2019 04:54) (98% - 100%)      PHYSICAL EXAM  GEN: NAD, Awake and Alert    SPINE: Skin intact, TTP over T6 spinous process, otherwise no bony tenderness or step-offs appreciated throughout cervical/thoracic/lumbar/sacral spine, no paraspinal muscular tenderness.    Denies saddle anesthesia  Denies loss of bowel/bladder incontinence       Motor:                     C5                C6              C7               C8           T1   R             5/5                5/5            5/5              5/5          5/5  L             5/5                5/5            5/5              5/5          5/5                    L2                  L3             L4              L5            S1  R            5/5                5/5             5/5            5/5          5/5  L             5/5                5/5            5/5            5/5          5/5      Sensory:            C5         C6         C7      C8       T1        (0=absent, 1=impaired, 2=normal, NT=not testable)  R         2            2           2        2         2  L          2            2           2        2         2               L2          L3         L4      L5       S1         (0=absent, 1=impaired, 2=normal, NT=not testable)  R         2            2            2        2        2  L          2            2           2        2         2      Negative Babinski  Negative Myoclonus    Secondary Exam: Benign, NVI, intact, no other orthopedic injuries at this time, compartments soft and compressible      IMAGING  CT Chest: T6 vertebral compression fracture mildly progressed from prior imaging  CT TSp (8/4): Acute T6 vertebral compression fracture

## 2019-09-15 NOTE — PHYSICAL THERAPY INITIAL EVALUATION ADULT - PERTINENT HX OF CURRENT PROBLEM, REHAB EVAL
87y Female presents with mid back pain s/p recent fall last month on 8/4/19. Was seen in  ED and diagnosed with acute compression fracture of T6. Patient was brought from home to the ED today 9/14 for acutely worsening back pain for the last 1-2 days. Denies fall/trauma since previous evaluation. Ambulates at baseline with cane/walker.

## 2019-09-15 NOTE — PROGRESS NOTE ADULT - ASSESSMENT
87F with worsening back pain s/p vertebral compression fracture on 8/4/2019    Mild progression of T6 vertebral compression fracture from prior imaging  No evidence of acute cord compression  No red flag symptoms  Patient is nvi with mild tenderness to palpation on exam  Ambulation likely pain control issue  Recommend pain control, if pain does not improve, may benefit from repeat MRI as inpatient if unable to obtain [place] imaging    -Pain control,   -Started Miacaclin nasal spray qD, alternate nostrils each day  -WBAT BLLE, no indication for brace as level of fracture is too high  -Will obtain MRI from patient's family   -Recommend new MRI if unable to obtain   -DVT ppx per primary team  -Medical management per primary team  -Discussed with attending, Dr. aNyak, who agrees with plan, will advise if plan changes  -Ortho to follow

## 2019-09-16 RX ADMIN — LIDOCAINE 1 PATCH: 4 CREAM TOPICAL at 00:42

## 2019-09-16 RX ADMIN — CEFTRIAXONE 1000 MILLIGRAM(S): 500 INJECTION, POWDER, FOR SOLUTION INTRAMUSCULAR; INTRAVENOUS at 12:48

## 2019-09-16 RX ADMIN — Medication 650 MILLIGRAM(S): at 08:22

## 2019-09-16 RX ADMIN — SPIRONOLACTONE 25 MILLIGRAM(S): 25 TABLET, FILM COATED ORAL at 12:47

## 2019-09-16 RX ADMIN — Medication 81 MILLIGRAM(S): at 12:47

## 2019-09-16 RX ADMIN — Medication 650 MILLIGRAM(S): at 21:50

## 2019-09-16 RX ADMIN — Medication 400 MILLIGRAM(S): at 17:29

## 2019-09-16 RX ADMIN — Medication 60 MILLIGRAM(S): at 08:21

## 2019-09-16 RX ADMIN — Medication 30 MILLIGRAM(S): at 08:21

## 2019-09-16 RX ADMIN — CALCITONIN SALMON 1 SPRAY(S): 200 INJECTION, SOLUTION INTRAMUSCULAR at 12:46

## 2019-09-16 RX ADMIN — LIDOCAINE 1 PATCH: 4 CREAM TOPICAL at 21:49

## 2019-09-16 RX ADMIN — Medication 400 MILLIGRAM(S): at 02:43

## 2019-09-16 RX ADMIN — BUPROPION HYDROCHLORIDE 150 MILLIGRAM(S): 150 TABLET, EXTENDED RELEASE ORAL at 12:46

## 2019-09-16 RX ADMIN — AZITHROMYCIN 500 MILLIGRAM(S): 500 TABLET, FILM COATED ORAL at 12:46

## 2019-09-16 NOTE — PROGRESS NOTE ADULT - SUBJECTIVE AND OBJECTIVE BOX
87/F with PMHx of HTN, hypothyroidism, Led edema on diuretics, upper back pain s/p reported fall last month on 8/4/2019, was seen in  ED with CT LS spine showing acute comp fx of T6 was  brought  from home for c/o  worsening upper back pain for the last 1 - 2 days. Pt says that she can't do much d/t pain. She lives alone. No recent fall/ injury. Had MRI upper back done outside about a week ago, no reports.     Pt has no bladder/bowel involvement.  CXR shows LLL infiltrate.        09/15/19: Patient seen and examined. She denies any new complaints.   09/16/19: Patient seen and examined. Feels tired today, not sleep last night as per patient.       Vital Signs Last 24 Hrs  T(C): 36.3 (16 Sep 2019 11:07), Max: 36.8 (15 Sep 2019 17:45)  T(F): 97.3 (16 Sep 2019 11:07), Max: 98.2 (15 Sep 2019 17:45)  HR: 97 (16 Sep 2019 11:07) (89 - 97)  BP: 95/54 (16 Sep 2019 11:07) (95/54 - 110/49)  BP(mean): --  RR: 18 (16 Sep 2019 11:07) (18 - 20)  SpO2: 95% (16 Sep 2019 11:07) (95% - 95%)        Physical Exam:         	Constitutional: Well appearing  	HEENT: Atraumatic, RAYMOND, Normal, No congestion  	Respiratory: Breath Sounds normal, no rhonchi/wheeze  	Cardiovascular: N S1S2;   	Gastrointestinal: Abdomen soft, non tender, Bowel Sounds present  	Extremities: No edema, peripheral pulses present  	Neurological: AAO x 3, no gross focal motor deficits  	Skin: Non cellulitic, no rash, ulcers  	Lymph Nodes: No lymphadenopathy noted  	Back: No CVA tenderness   	Musculoskeletal: non tender.          Labs:                   15 Sep 2019 08:31    139    |  108    |  27     ----------------------------<  92     5.1     |  23     |  1.29     Ca    9.0        15 Sep 2019 08:31          MEDICATIONS  (STANDING):  aspirin  chewable 81 milliGRAM(s) Oral daily  azithromycin   Tablet 500 milliGRAM(s) Oral daily  buPROPion XL . 150 milliGRAM(s) Oral daily  calcitonin Nasal 1 Spray(s) Nasal daily  cefTRIAXone Injectable. 1000 milliGRAM(s) IV Push every 24 hours  furosemide    Tablet 20 milliGRAM(s) Oral daily  heparin  Injectable 5000 Unit(s) SubCutaneous every 8 hours  lidocaine   Patch 1 Patch Transdermal daily  lisinopril 5 milliGRAM(s) Oral daily  spironolactone 25 milliGRAM(s) Oral daily  thyroid 60 milliGRAM(s) Oral daily  thyroid 30 milliGRAM(s) Oral daily    MEDICATIONS  (PRN):  acetaminophen   Tablet .. 650 milliGRAM(s) Oral every 6 hours PRN Temp greater or equal to 38C (100.4F), Mild Pain (1 - 3)  ibuprofen  Tablet. 400 milliGRAM(s) Oral every 8 hours PRN Severe Pain (7 - 10)  morphine  - Injectable 2 milliGRAM(s) IV Push every 4 hours PRN Severe Pain (7 - 10)  ondansetron Injectable 4 milliGRAM(s) IV Push every 6 hours PRN Nausea  senna 2 Tablet(s) Oral at bedtime PRN Constipation            Assessment and Plan:   Assessment:  · Assessment		  87/F with PMHx of HTN, hypothyroidism, Led edema on diuretics, upper back pain s/p reported fall last month on 8/4/2019, was seen in  ED with CT LS spine showing acute comp fx of T6 , admitted with     1) Worsening Upper Back Pain: likely sec to T6 Fx  spine sx consult appreciated  PT consult  tylenol + lidocaine patch; pt allergic to opiates  family to bring MRI report  PT eval appreciated    2) LLL PNA: CAP  continue ceftriaxone  + po zithro  f/u blood cx    3) ? UTI: had similar UA last month but urine cx grew normal urogenital abi  f/u urine cx  cont Ceftriaxone for now    4) HTN: cont home meds    5) Mildly increased Cr 1.34:   likely sec to diuretics at home for leg edema given by Dr. Mike Garg  monitor     6) Hx of leg edema: cont diuretics  f/u BMP    7) Hypothyroidism: cont [place] thyroid    8) DVT PPX: heparin s/q

## 2019-09-16 NOTE — PROGRESS NOTE ADULT - SUBJECTIVE AND OBJECTIVE BOX
Pt seen resting in bed. Pt has intermittent thoracic pain worse with movement and alleviated with medications. Pt denies radicular pain. Pt denies lower extremities pain, numbness, and weakness.     PE  Gen appearance: NAD  motor strength: 5/5 of b/l lower extremities  Sensation: intact to light touch  No calf tenderness bilaterally  Thoracic paraspinal tenderness bilaterally at approx T6,T7 region    Plan---T6 vertebral compression fracture   - Continue analgesics and Miacalcin  - No surgical indications at this time  - Conservative management recommended.   - F/u with Spine specialist as outpatient. Sign off from spine stand point. Reconsult as needed.   - Discussed case with Dr. Nayak.

## 2019-09-17 RX ADMIN — Medication 400 MILLIGRAM(S): at 05:13

## 2019-09-17 RX ADMIN — LISINOPRIL 5 MILLIGRAM(S): 2.5 TABLET ORAL at 06:02

## 2019-09-17 RX ADMIN — Medication 81 MILLIGRAM(S): at 11:16

## 2019-09-17 RX ADMIN — Medication 650 MILLIGRAM(S): at 19:45

## 2019-09-17 RX ADMIN — Medication 60 MILLIGRAM(S): at 07:10

## 2019-09-17 RX ADMIN — LIDOCAINE 1 PATCH: 4 CREAM TOPICAL at 23:17

## 2019-09-17 RX ADMIN — Medication 20 MILLIGRAM(S): at 11:16

## 2019-09-17 RX ADMIN — LIDOCAINE 1 PATCH: 4 CREAM TOPICAL at 11:17

## 2019-09-17 RX ADMIN — CEFTRIAXONE 1000 MILLIGRAM(S): 500 INJECTION, POWDER, FOR SOLUTION INTRAMUSCULAR; INTRAVENOUS at 11:17

## 2019-09-17 RX ADMIN — Medication 400 MILLIGRAM(S): at 16:03

## 2019-09-17 RX ADMIN — MORPHINE SULFATE 2 MILLIGRAM(S): 50 CAPSULE, EXTENDED RELEASE ORAL at 22:04

## 2019-09-17 RX ADMIN — LIDOCAINE 1 PATCH: 4 CREAM TOPICAL at 20:20

## 2019-09-17 RX ADMIN — CALCITONIN SALMON 1 SPRAY(S): 200 INJECTION, SOLUTION INTRAMUSCULAR at 11:16

## 2019-09-17 RX ADMIN — MORPHINE SULFATE 2 MILLIGRAM(S): 50 CAPSULE, EXTENDED RELEASE ORAL at 21:49

## 2019-09-17 RX ADMIN — Medication 30 MILLIGRAM(S): at 07:10

## 2019-09-17 RX ADMIN — Medication 650 MILLIGRAM(S): at 18:45

## 2019-09-17 RX ADMIN — ONDANSETRON 4 MILLIGRAM(S): 8 TABLET, FILM COATED ORAL at 21:55

## 2019-09-17 RX ADMIN — BUPROPION HYDROCHLORIDE 150 MILLIGRAM(S): 150 TABLET, EXTENDED RELEASE ORAL at 11:16

## 2019-09-17 RX ADMIN — AZITHROMYCIN 500 MILLIGRAM(S): 500 TABLET, FILM COATED ORAL at 11:16

## 2019-09-17 RX ADMIN — SPIRONOLACTONE 25 MILLIGRAM(S): 25 TABLET, FILM COATED ORAL at 06:02

## 2019-09-17 NOTE — PROGRESS NOTE ADULT - SUBJECTIVE AND OBJECTIVE BOX
CC: Back Pain  Subjective and Objective: 	  87/F with PMHx of HTN, hypothyroidism, Led edema on diuretics, upper back pain s/p reported fall last month on 8/4/2019, was seen in  ED with CT LS spine showing acute comp fx of T6 was  brought  from home for c/o  worsening upper back pain for the last 1 - 2 days. Pt says that she can't do much d/t pain. She lives alone. No recent fall/ injury. Had MRI upper back done outside about a week ago, no reports.     Pt has no bladder/bowel involvement.  CXR shows LLL infiltrate.    09/15/19: Patient seen and examined. She denies any new complaints.   09/16/19: Patient seen and examined. Feels tired today, not sleep last night as per patient.   09/17/19: No overnight events, pt denies any back pain, comfortable at this time.    Vital Signs Last 24 Hrs  T(C): 36.4 (17 Sep 2019 05:23), Max: 36.4 (17 Sep 2019 05:23)  T(F): 97.5 (17 Sep 2019 05:23), Max: 97.5 (17 Sep 2019 05:23)  HR: 81 (17 Sep 2019 05:23) (81 - 92)  BP: 111/42 (17 Sep 2019 05:23) (96/52 - 111/42)  BP(mean): --  RR: 18 (17 Sep 2019 05:23) (18 - 18)  SpO2: 96% (17 Sep 2019 05:23) (96% - 96%)    PHYSICAL EXAM:    Constitutional: NAD, awake and alert, frail, elderly  HEENT: PERR, EOMI, Normal Hearing, MMM  Neck: Soft and supple  Respiratory: Breath sounds are diminished b/l   Cardiovascular: S1 and S2, regular rate and rhythm, no Murmurs, gallops or rubs  Gastrointestinal: Bowel Sounds present, soft, nontender, nondistended, no guarding, no rebound  Extremities: No peripheral edema  Neurological: A/O x 3, no focal deficits in my limited exam  Skin: No rashes        MEDICATIONS  (STANDING):  aspirin  chewable 81 milliGRAM(s) Oral daily  azithromycin   Tablet 500 milliGRAM(s) Oral daily  buPROPion XL . 150 milliGRAM(s) Oral daily  calcitonin Nasal 1 Spray(s) Nasal daily  cefTRIAXone Injectable. 1000 milliGRAM(s) IV Push every 24 hours  furosemide    Tablet 20 milliGRAM(s) Oral daily  heparin  Injectable 5000 Unit(s) SubCutaneous every 8 hours  lidocaine   Patch 1 Patch Transdermal daily  lisinopril 5 milliGRAM(s) Oral daily  spironolactone 25 milliGRAM(s) Oral daily  thyroid 60 milliGRAM(s) Oral daily  thyroid 30 milliGRAM(s) Oral daily    MEDICATIONS  (PRN):  acetaminophen   Tablet .. 650 milliGRAM(s) Oral every 6 hours PRN Temp greater or equal to 38C (100.4F), Mild Pain (1 - 3)  ibuprofen  Tablet. 400 milliGRAM(s) Oral every 8 hours PRN Severe Pain (7 - 10)  morphine  - Injectable 2 milliGRAM(s) IV Push every 4 hours PRN Severe Pain (7 - 10)  ondansetron Injectable 4 milliGRAM(s) IV Push every 6 hours PRN Nausea  senna 2 Tablet(s) Oral at bedtime PRN Constipation      CAPILLARY BLOOD GLUCOSE        Assessment and Plan:  87/F with PMHx of HTN, hypothyroidism, Led edema on diuretics, upper back pain s/p reported fall last month on 8/4/2019, was seen in  ED with CT LS spine showing acute comp fx of T6 , admitted with     Worsening Upper Back Pain: likely sec to T6 compression Fx  -spine sx consult appreciated - no surgical intervention indicated.  -PT   -tylenol + lidocaine patch; pt allergic to opiates  -family to bring MRI report    LLL PNA: CAP  -CT Chest --> Subsegmental atelectasis and or pneumonia in the anterior left lower lobe. Bibasilar atelectasis  -continue ceftriaxone  + po zithro   -incentive spirometry for possible atelectasis   -BCx neg    UTI: had similar UA last month but urine cx grew normal urogenital abi  -UCx: No culture   -on ceftriaxone for pneumonia     HTN:   -cont home meds    Mildly increased Cr 1.34: Stable    Hx of leg edema:   -cont diuretics    Hypothyroidism:   -cont [place] thyroid    DVT PPX:   -heparin s/q    Dispo:  -[facility] tomorrow                                              Electronic Signatures:  Escobar Dumont)  (Signed 16-Sep-2019 12:47)  	Authored: Progress Note, Reason for Admission, Subjective and Objective      Last Updated: 16-Sep-2019 12:47 by Escobar Dumont)

## 2019-09-18 ENCOUNTER — TRANSCRIPTION ENCOUNTER (OUTPATIENT)
Age: 84
End: 2019-09-18

## 2019-09-18 VITALS
OXYGEN SATURATION: 94 % | RESPIRATION RATE: 16 BRPM | TEMPERATURE: 98 F | SYSTOLIC BLOOD PRESSURE: 107 MMHG | DIASTOLIC BLOOD PRESSURE: 35 MMHG | HEART RATE: 84 BPM

## 2019-09-18 RX ORDER — ASPIRIN/CALCIUM CARB/MAGNESIUM 324 MG
1 TABLET ORAL
Qty: 0 | Refills: 0 | DISCHARGE
Start: 2019-09-18

## 2019-09-18 RX ORDER — LISINOPRIL 2.5 MG/1
1 TABLET ORAL
Qty: 0 | Refills: 0 | DISCHARGE
Start: 2019-09-18

## 2019-09-18 RX ORDER — BUPROPION HYDROCHLORIDE 150 MG/1
1 TABLET, EXTENDED RELEASE ORAL
Qty: 0 | Refills: 0 | DISCHARGE
Start: 2019-09-18

## 2019-09-18 RX ORDER — ASPIRIN/CALCIUM CARB/MAGNESIUM 324 MG
1 TABLET ORAL
Qty: 0 | Refills: 0 | DISCHARGE

## 2019-09-18 RX ORDER — CALCITONIN SALMON 200 [IU]/ML
1 INJECTION, SOLUTION INTRAMUSCULAR
Qty: 0 | Refills: 0 | DISCHARGE
Start: 2019-09-18

## 2019-09-18 RX ORDER — FUROSEMIDE 40 MG
1 TABLET ORAL
Qty: 0 | Refills: 0 | DISCHARGE
Start: 2019-09-18

## 2019-09-18 RX ORDER — SPIRONOLACTONE 25 MG/1
1 TABLET, FILM COATED ORAL
Qty: 0 | Refills: 0 | DISCHARGE

## 2019-09-18 RX ORDER — IBUPROFEN 200 MG
1 TABLET ORAL
Qty: 0 | Refills: 0 | DISCHARGE
Start: 2019-09-18

## 2019-09-18 RX ORDER — CEFUROXIME AXETIL 250 MG
1 TABLET ORAL
Qty: 6 | Refills: 0
Start: 2019-09-18 | End: 2019-09-20

## 2019-09-18 RX ORDER — BUPROPION HYDROCHLORIDE 150 MG/1
1 TABLET, EXTENDED RELEASE ORAL
Qty: 0 | Refills: 0 | DISCHARGE

## 2019-09-18 RX ORDER — SPIRONOLACTONE 25 MG/1
1 TABLET, FILM COATED ORAL
Qty: 0 | Refills: 0 | DISCHARGE
Start: 2019-09-18

## 2019-09-18 RX ORDER — ACETAMINOPHEN 500 MG
2 TABLET ORAL
Qty: 0 | Refills: 0 | DISCHARGE
Start: 2019-09-18

## 2019-09-18 RX ORDER — LISINOPRIL 2.5 MG/1
1 TABLET ORAL
Qty: 0 | Refills: 0 | DISCHARGE

## 2019-09-18 RX ORDER — FUROSEMIDE 40 MG
1 TABLET ORAL
Qty: 0 | Refills: 0 | DISCHARGE

## 2019-09-18 RX ADMIN — Medication 400 MILLIGRAM(S): at 12:03

## 2019-09-18 RX ADMIN — CALCITONIN SALMON 1 SPRAY(S): 200 INJECTION, SOLUTION INTRAMUSCULAR at 12:04

## 2019-09-18 RX ADMIN — LIDOCAINE 1 PATCH: 4 CREAM TOPICAL at 12:04

## 2019-09-18 RX ADMIN — CEFTRIAXONE 1000 MILLIGRAM(S): 500 INJECTION, POWDER, FOR SOLUTION INTRAMUSCULAR; INTRAVENOUS at 12:04

## 2019-09-18 RX ADMIN — MORPHINE SULFATE 2 MILLIGRAM(S): 50 CAPSULE, EXTENDED RELEASE ORAL at 02:24

## 2019-09-18 RX ADMIN — AZITHROMYCIN 500 MILLIGRAM(S): 500 TABLET, FILM COATED ORAL at 12:04

## 2019-09-18 RX ADMIN — BUPROPION HYDROCHLORIDE 150 MILLIGRAM(S): 150 TABLET, EXTENDED RELEASE ORAL at 12:04

## 2019-09-18 RX ADMIN — Medication 400 MILLIGRAM(S): at 02:45

## 2019-09-18 RX ADMIN — Medication 30 MILLIGRAM(S): at 06:05

## 2019-09-18 RX ADMIN — ONDANSETRON 4 MILLIGRAM(S): 8 TABLET, FILM COATED ORAL at 02:24

## 2019-09-18 RX ADMIN — Medication 60 MILLIGRAM(S): at 06:06

## 2019-09-18 RX ADMIN — SPIRONOLACTONE 25 MILLIGRAM(S): 25 TABLET, FILM COATED ORAL at 06:05

## 2019-09-18 RX ADMIN — Medication 81 MILLIGRAM(S): at 12:04

## 2019-09-18 RX ADMIN — MORPHINE SULFATE 2 MILLIGRAM(S): 50 CAPSULE, EXTENDED RELEASE ORAL at 02:40

## 2019-09-18 RX ADMIN — Medication 20 MILLIGRAM(S): at 12:04

## 2019-09-18 RX ADMIN — Medication 400 MILLIGRAM(S): at 02:11

## 2019-09-18 NOTE — DISCHARGE NOTE PROVIDER - NSDCCPCAREPLAN_GEN_ALL_CORE_FT
PRINCIPAL DISCHARGE DIAGNOSIS  Diagnosis: Upper back pain  Assessment and Plan of Treatment: due to compression fracture and osteoporosis - physical therapy, med management.  No surgical intervention indicated.      SECONDARY DISCHARGE DIAGNOSES  Diagnosis: Acute cystitis without hematuria  Assessment and Plan of Treatment: resolved.    Diagnosis: CAP (community acquired pneumonia)  Assessment and Plan of Treatment: complete antibiotic course.

## 2019-09-18 NOTE — DISCHARGE NOTE PROVIDER - HOSPITAL COURSE
CC: Back Pain    Subjective and Objective:     87/F with PMHx of HTN, hypothyroidism, Led edema on diuretics, upper back pain s/p reported fall last month on 8/4/2019, was seen in  ED with CT LS spine showing acute comp fx of T6 was  brought  from home for c/o  worsening upper back pain for the last 1 - 2 days. Pt says that she can't do much d/t pain. She lives alone. No recent fall/ injury. Had MRI upper back done outside about a week ago, no reports.         Pt has no bladder/bowel involvement.    CXR shows LLL infiltrate.        09/15/19: Patient seen and examined. She denies any new complaints.     09/16/19: Patient seen and examined. Feels tired today, not sleep last night as per patient.     09/17/19: No overnight events, pt denies any back pain, comfortable at this time.    09/18/19: Pt denies back pain, sob, n, v.  Pt comfortable.  Awaiting discharge to rehab.        REVIEW OF SYSTEMS: All other review of systems is negative unless indicated above.        Vital Signs Last 24 Hrs    T(C): 36.5 (18 Sep 2019 11:15), Max: 36.9 (18 Sep 2019 05:12)    T(F): 97.7 (18 Sep 2019 11:15), Max: 98.5 (18 Sep 2019 05:12)    HR: 84 (18 Sep 2019 11:15) (84 - 96)    BP: 107/35 (18 Sep 2019 11:15) (104/43 - 108/55)    BP(mean): --    RR: 16 (18 Sep 2019 11:15) (16 - 18)    SpO2: 94% (18 Sep 2019 11:15) (94% - 98%)        PHYSICAL EXAM:        Constitutional: NAD, awake and alert, frail, elderly    HEENT: PERR, EOMI, Normal Hearing, MMM    Neck: Soft and supple    Respiratory: Breath sounds are diminished b/l     Cardiovascular: S1 and S2, regular rate and rhythm, no Murmurs, gallops or rubs    Gastrointestinal: Bowel Sounds present, soft, nontender, nondistended, no guarding, no rebound    Extremities: No peripheral edema    Neurological: A/O x 3, no focal deficits in my limited exam    Skin: No rashes        meds/labs: Reviewed        Assessment and Plan:  87/F with PMHx of HTN, hypothyroidism, Led edema on diuretics, upper back pain s/p reported fall last month on 8/4/2019, was seen in  ED with CT LS spine showing acute comp fx of T6 , admitted with         Worsening Upper Back Pain: likely sec to T6 compression Fx    -spine sx consult appreciated - no surgical intervention indicated.    -PT     -nasal calcitonin started per ortho for osteoporosis     -tylenol + lidocaine patch; pt allergic to opiates        LLL PNA: CAP    -CT Chest --> Subsegmental atelectasis and or pneumonia in the anterior left lower lobe. Bibasilar atelectasis    -continue ceftriaxone  + po zithro --> change to ceftin to complete course.    -incentive spirometry for possible atelectasis     -BCx neg        UTI: had similar UA last month but urine cx grew normal urogenital abi    -UCx: No culture     -on ceftriaxone for pneumonia.        HTN:     -cont home meds        Mildly increased Cr 1.34: Stable        Hx of leg edema:     -cont diuretics        Hypothyroidism:     -cont Busby thyroid            Discharge to [facility].

## 2019-09-18 NOTE — DISCHARGE NOTE NURSING/CASE MANAGEMENT/SOCIAL WORK - NSDCPNDISPN_GEN_ALL_CORE
Education provided on the pain management plan of care/Side effects of pain management treatment/Opioids not applicable/not prescribed/Activities of daily living, including home environment that might     exacerbate pain or reduce effectiveness of the pain management plan of care as well as strategies to address these issues/Safe use, storage and disposal of opioids when prescribed

## 2019-09-18 NOTE — DISCHARGE NOTE PROVIDER - PROVIDER TOKENS
FREE:[LAST:[Follow up with spine specialist as outpatient.],PHONE:[(   )    -],FAX:[(   )    -]],PROVIDER:[TOKEN:[[id number]],FOLLOWUP:[1 week]]

## 2019-09-18 NOTE — DISCHARGE NOTE PROVIDER - CARE PROVIDER_API CALL
Follow up with spine specialist as outpatient.,   Phone: (   )    -  Fax: (   )    -  Follow Up Time:     Madelaine Zayas)  Internal Medicine  [address] [facility], [address] [place] [address]  Phone: [phone]  Fax: [phone]  Follow Up Time: 1 week

## 2019-09-18 NOTE — DISCHARGE NOTE NURSING/CASE MANAGEMENT/SOCIAL WORK - PATIENT PORTAL LINK FT
You can access the FollowMyHealth Patient Portal offered by [facility] by registering at the following website: [email or web address]. By joining [facility]’s FollowMyHealth portal, you will also be able to view your health information using other applications (apps) compatible with our system.

## 2019-09-19 LAB
CULTURE RESULTS: SIGNIFICANT CHANGE UP
CULTURE RESULTS: SIGNIFICANT CHANGE UP
SPECIMEN SOURCE: SIGNIFICANT CHANGE UP
SPECIMEN SOURCE: SIGNIFICANT CHANGE UP

## 2019-09-23 NOTE — CDI QUERY NOTE - NSCDIOTHERTXTBX_GEN_ALL_CORE_HH
Documentation on chart of PNA and T6 compression fracture.     ED Provider documented: s/p reported fall couple weeks ago with 8/4/2019  ED CT TLS spine + acute comp fx T6, BIBA from home c/o'ing worsening pain past 1 - 2 dd., no recent fall/ injury.  Pt poorly able to move around, perform ADLs due to the pain.    IMAGING  CT Chest: T6 vertebral compression fracture mildly progressed from prior imaging  CT TSp (8/4): Acute T6 vertebral compression fracture     Orthopedic documented: mild progression of T6 vertebral compression fracture from  prior imaging. Worsening back pain likely secondary to T6 compression fracture. No evidence of acute cord compression.     Discharge summary stated back pain likely secondary to T6 fracture  and osteoporosis - no surgical intervention. Calcitonin started for osteoporosis.     Please clarify a diagnosis.  A) Compression fracture is due to/related to fall on 8/4/2019  B) Compression fracture is due to/related to osteoporosis  C) Unknown reason for compression fracture  D) Other ( Please specify condition)

## 2020-08-10 NOTE — ED PROVIDER NOTE - NS_EDPROVIDERDISPOUSERTYPE_ED_A_ED
Hypertensive and Diabetic ESRD.  Urgent HD planned.   EKG showed NSR no ischemic change.   CXR shows increased interstitial infiltrates and fluid overload.  Repeat serum potassium after HD. Attending Attestation (For Attendings USE Only)...

## 2020-10-08 ENCOUNTER — INPATIENT (INPATIENT)
Facility: HOSPITAL | Age: 85
LOS: 5 days | Discharge: NURSING, REHAB OR LONG-TERM CARE | DRG: 563 | End: 2020-10-14
Attending: INTERNAL MEDICINE | Admitting: INTERNAL MEDICINE
Payer: MEDICARE

## 2020-10-08 VITALS
HEIGHT: 64 IN | OXYGEN SATURATION: 97 % | SYSTOLIC BLOOD PRESSURE: 150 MMHG | HEART RATE: 100 BPM | TEMPERATURE: 98 F | DIASTOLIC BLOOD PRESSURE: 73 MMHG | RESPIRATION RATE: 17 BRPM | WEIGHT: 169.98 LBS

## 2020-10-08 DIAGNOSIS — Z90.49 ACQUIRED ABSENCE OF OTHER SPECIFIED PARTS OF DIGESTIVE TRACT: Chronic | ICD-10-CM

## 2020-10-08 LAB
APTT BLD: 29.6 SEC — SIGNIFICANT CHANGE UP (ref 27.5–35.5)
BASOPHILS # BLD AUTO: 0.05 K/UL — SIGNIFICANT CHANGE UP (ref 0–0.2)
BASOPHILS NFR BLD AUTO: 0.7 % — SIGNIFICANT CHANGE UP (ref 0–2)
EOSINOPHIL # BLD AUTO: 0.23 K/UL — SIGNIFICANT CHANGE UP (ref 0–0.5)
EOSINOPHIL NFR BLD AUTO: 3 % — SIGNIFICANT CHANGE UP (ref 0–6)
HCT VFR BLD CALC: 40.4 % — SIGNIFICANT CHANGE UP (ref 34.5–45)
HGB BLD-MCNC: 13.1 G/DL — SIGNIFICANT CHANGE UP (ref 11.5–15.5)
IMM GRANULOCYTES NFR BLD AUTO: 0.3 % — SIGNIFICANT CHANGE UP (ref 0–1.5)
INR BLD: 0.97 RATIO — SIGNIFICANT CHANGE UP (ref 0.88–1.16)
LYMPHOCYTES # BLD AUTO: 2.1 K/UL — SIGNIFICANT CHANGE UP (ref 1–3.3)
LYMPHOCYTES # BLD AUTO: 27.4 % — SIGNIFICANT CHANGE UP (ref 13–44)
MCHC RBC-ENTMCNC: 29.4 PG — SIGNIFICANT CHANGE UP (ref 27–34)
MCHC RBC-ENTMCNC: 32.4 GM/DL — SIGNIFICANT CHANGE UP (ref 32–36)
MCV RBC AUTO: 90.6 FL — SIGNIFICANT CHANGE UP (ref 80–100)
MONOCYTES # BLD AUTO: 0.71 K/UL — SIGNIFICANT CHANGE UP (ref 0–0.9)
MONOCYTES NFR BLD AUTO: 9.3 % — SIGNIFICANT CHANGE UP (ref 2–14)
NEUTROPHILS # BLD AUTO: 4.56 K/UL — SIGNIFICANT CHANGE UP (ref 1.8–7.4)
NEUTROPHILS NFR BLD AUTO: 59.3 % — SIGNIFICANT CHANGE UP (ref 43–77)
PLATELET # BLD AUTO: 287 K/UL — SIGNIFICANT CHANGE UP (ref 150–400)
PROTHROM AB SERPL-ACNC: 11.4 SEC — SIGNIFICANT CHANGE UP (ref 10.6–13.6)
RBC # BLD: 4.46 M/UL — SIGNIFICANT CHANGE UP (ref 3.8–5.2)
RBC # FLD: 12.5 % — SIGNIFICANT CHANGE UP (ref 10.3–14.5)
WBC # BLD: 7.67 K/UL — SIGNIFICANT CHANGE UP (ref 3.8–10.5)
WBC # FLD AUTO: 7.67 K/UL — SIGNIFICANT CHANGE UP (ref 3.8–10.5)

## 2020-10-08 PROCEDURE — 73090 X-RAY EXAM OF FOREARM: CPT | Mod: 26,LT

## 2020-10-08 PROCEDURE — 93010 ELECTROCARDIOGRAM REPORT: CPT

## 2020-10-08 PROCEDURE — 73030 X-RAY EXAM OF SHOULDER: CPT | Mod: 26,LT

## 2020-10-08 PROCEDURE — 73110 X-RAY EXAM OF WRIST: CPT | Mod: 26,LT

## 2020-10-08 PROCEDURE — 73060 X-RAY EXAM OF HUMERUS: CPT | Mod: 26,LT

## 2020-10-08 PROCEDURE — 72125 CT NECK SPINE W/O DYE: CPT | Mod: 26

## 2020-10-08 PROCEDURE — 72170 X-RAY EXAM OF PELVIS: CPT | Mod: 26

## 2020-10-08 PROCEDURE — 70450 CT HEAD/BRAIN W/O DYE: CPT | Mod: 26

## 2020-10-08 PROCEDURE — 73130 X-RAY EXAM OF HAND: CPT | Mod: 26,LT

## 2020-10-08 RX ORDER — SODIUM CHLORIDE 9 MG/ML
1000 INJECTION INTRAMUSCULAR; INTRAVENOUS; SUBCUTANEOUS ONCE
Refills: 0 | Status: COMPLETED | OUTPATIENT
Start: 2020-10-08 | End: 2020-10-08

## 2020-10-08 RX ORDER — HYDROMORPHONE HYDROCHLORIDE 2 MG/ML
0.5 INJECTION INTRAMUSCULAR; INTRAVENOUS; SUBCUTANEOUS ONCE
Refills: 0 | Status: DISCONTINUED | OUTPATIENT
Start: 2020-10-08 | End: 2020-10-08

## 2020-10-08 RX ORDER — ONDANSETRON 8 MG/1
4 TABLET, FILM COATED ORAL ONCE
Refills: 0 | Status: COMPLETED | OUTPATIENT
Start: 2020-10-08 | End: 2020-10-08

## 2020-10-08 RX ADMIN — SODIUM CHLORIDE 1000 MILLILITER(S): 9 INJECTION INTRAMUSCULAR; INTRAVENOUS; SUBCUTANEOUS at 23:01

## 2020-10-08 RX ADMIN — HYDROMORPHONE HYDROCHLORIDE 0.5 MILLIGRAM(S): 2 INJECTION INTRAMUSCULAR; INTRAVENOUS; SUBCUTANEOUS at 23:30

## 2020-10-08 RX ADMIN — ONDANSETRON 4 MILLIGRAM(S): 8 TABLET, FILM COATED ORAL at 23:01

## 2020-10-08 RX ADMIN — HYDROMORPHONE HYDROCHLORIDE 0.5 MILLIGRAM(S): 2 INJECTION INTRAMUSCULAR; INTRAVENOUS; SUBCUTANEOUS at 23:00

## 2020-10-08 NOTE — ED PROVIDER NOTE - MUSCULOSKELETAL, MLM
Spine appears normal. Swelling and tenderness left shoulder. Good distal pulses. Able to move fingers. Tenderness and swelling right frontal area.

## 2020-10-08 NOTE — ED PROVIDER NOTE - CLINICAL SUMMARY MEDICAL DECISION MAKING FREE TEXT BOX
89 y/o female s/p mechanical fall with head injury and shoulder injury. On ASA. Plan: CT head, XR, labs, pain meds.

## 2020-10-08 NOTE — ED ADULT TRIAGE NOTE - CHIEF COMPLAINT QUOTE
Pt bibems from home s/p unwitnessed mechanical fall. +headstrike unknown LOC, takes a baby aspirin. Pt found on ground by EMS after pt called using her life alert button. Pt awake and alert at this time with obvious contusion to forehead. Pt c/o L shoulder pain. Trauma alert called at [id number].

## 2020-10-08 NOTE — ED PROVIDER NOTE - CARE PLAN
Principal Discharge DX:	Closed fracture of left shoulder, initial encounter  Secondary Diagnosis:	Closed fracture of left wrist, initial encounter

## 2020-10-08 NOTE — ED PROVIDER NOTE - OBJECTIVE STATEMENT
89 y/o female with a PMHx of HTN, hypothyroid presents to the ED s/p fall. Pt reports she was in the hallway of her house when she lost her balance and fell. Hit head. No LOC. +left shoulder pain. On 81mg ASA. No hx of MI or CVA. Allergies: Prednisone, sulfa drugs. No other complaints at this time.

## 2020-10-09 DIAGNOSIS — S42.92XA FRACTURE OF LEFT SHOULDER GIRDLE, PART UNSPECIFIED, INITIAL ENCOUNTER FOR CLOSED FRACTURE: ICD-10-CM

## 2020-10-09 LAB
ALBUMIN SERPL ELPH-MCNC: 3.5 G/DL — SIGNIFICANT CHANGE UP (ref 3.3–5)
ALP SERPL-CCNC: 74 U/L — SIGNIFICANT CHANGE UP (ref 40–120)
ALT FLD-CCNC: 20 U/L — SIGNIFICANT CHANGE UP (ref 12–78)
ANION GAP SERPL CALC-SCNC: 4 MMOL/L — LOW (ref 5–17)
ANION GAP SERPL CALC-SCNC: 4 MMOL/L — LOW (ref 5–17)
APPEARANCE UR: CLEAR — SIGNIFICANT CHANGE UP
AST SERPL-CCNC: 19 U/L — SIGNIFICANT CHANGE UP (ref 15–37)
BILIRUB SERPL-MCNC: 0.3 MG/DL — SIGNIFICANT CHANGE UP (ref 0.2–1.2)
BILIRUB UR-MCNC: NEGATIVE — SIGNIFICANT CHANGE UP
BUN SERPL-MCNC: 28 MG/DL — HIGH (ref 7–23)
BUN SERPL-MCNC: 33 MG/DL — HIGH (ref 7–23)
CALCIUM SERPL-MCNC: 8.8 MG/DL — SIGNIFICANT CHANGE UP (ref 8.5–10.1)
CALCIUM SERPL-MCNC: 9.4 MG/DL — SIGNIFICANT CHANGE UP (ref 8.5–10.1)
CHLORIDE SERPL-SCNC: 105 MMOL/L — SIGNIFICANT CHANGE UP (ref 96–108)
CHLORIDE SERPL-SCNC: 108 MMOL/L — SIGNIFICANT CHANGE UP (ref 96–108)
CO2 SERPL-SCNC: 25 MMOL/L — SIGNIFICANT CHANGE UP (ref 22–31)
CO2 SERPL-SCNC: 29 MMOL/L — SIGNIFICANT CHANGE UP (ref 22–31)
COLOR SPEC: YELLOW — SIGNIFICANT CHANGE UP
CREAT SERPL-MCNC: 1.24 MG/DL — SIGNIFICANT CHANGE UP (ref 0.5–1.3)
CREAT SERPL-MCNC: 1.49 MG/DL — HIGH (ref 0.5–1.3)
DIFF PNL FLD: NEGATIVE — SIGNIFICANT CHANGE UP
GLUCOSE SERPL-MCNC: 102 MG/DL — HIGH (ref 70–99)
GLUCOSE SERPL-MCNC: 117 MG/DL — HIGH (ref 70–99)
GLUCOSE UR QL: NEGATIVE MG/DL — SIGNIFICANT CHANGE UP
HCT VFR BLD CALC: 33.7 % — LOW (ref 34.5–45)
HGB BLD-MCNC: 10.8 G/DL — LOW (ref 11.5–15.5)
KETONES UR-MCNC: NEGATIVE — SIGNIFICANT CHANGE UP
LEUKOCYTE ESTERASE UR-ACNC: NEGATIVE — SIGNIFICANT CHANGE UP
MAGNESIUM SERPL-MCNC: 1.9 MG/DL — SIGNIFICANT CHANGE UP (ref 1.6–2.6)
MCHC RBC-ENTMCNC: 29.1 PG — SIGNIFICANT CHANGE UP (ref 27–34)
MCHC RBC-ENTMCNC: 32 GM/DL — SIGNIFICANT CHANGE UP (ref 32–36)
MCV RBC AUTO: 90.8 FL — SIGNIFICANT CHANGE UP (ref 80–100)
NITRITE UR-MCNC: NEGATIVE — SIGNIFICANT CHANGE UP
PH UR: 5 — SIGNIFICANT CHANGE UP (ref 5–8)
PHOSPHATE SERPL-MCNC: 3.5 MG/DL — SIGNIFICANT CHANGE UP (ref 2.5–4.5)
PLATELET # BLD AUTO: 232 K/UL — SIGNIFICANT CHANGE UP (ref 150–400)
POTASSIUM SERPL-MCNC: 5.2 MMOL/L — SIGNIFICANT CHANGE UP (ref 3.5–5.3)
POTASSIUM SERPL-MCNC: 5.5 MMOL/L — HIGH (ref 3.5–5.3)
POTASSIUM SERPL-SCNC: 5.2 MMOL/L — SIGNIFICANT CHANGE UP (ref 3.5–5.3)
POTASSIUM SERPL-SCNC: 5.5 MMOL/L — HIGH (ref 3.5–5.3)
PROT SERPL-MCNC: 6.9 GM/DL — SIGNIFICANT CHANGE UP (ref 6–8.3)
PROT UR-MCNC: 15 MG/DL
RBC # BLD: 3.71 M/UL — LOW (ref 3.8–5.2)
RBC # FLD: 12.5 % — SIGNIFICANT CHANGE UP (ref 10.3–14.5)
SARS-COV-2 IGG SERPL QL IA: NEGATIVE — SIGNIFICANT CHANGE UP
SARS-COV-2 IGM SERPL IA-ACNC: <0.1 INDEX — SIGNIFICANT CHANGE UP
SARS-COV-2 RNA SPEC QL NAA+PROBE: SIGNIFICANT CHANGE UP
SODIUM SERPL-SCNC: 137 MMOL/L — SIGNIFICANT CHANGE UP (ref 135–145)
SODIUM SERPL-SCNC: 138 MMOL/L — SIGNIFICANT CHANGE UP (ref 135–145)
SP GR SPEC: 1.01 — SIGNIFICANT CHANGE UP (ref 1.01–1.02)
UROBILINOGEN FLD QL: NEGATIVE MG/DL — SIGNIFICANT CHANGE UP
VIT B12 SERPL-MCNC: 347 PG/ML — SIGNIFICANT CHANGE UP (ref 232–1245)
WBC # BLD: 7.22 K/UL — SIGNIFICANT CHANGE UP (ref 3.8–10.5)
WBC # FLD AUTO: 7.22 K/UL — SIGNIFICANT CHANGE UP (ref 3.8–10.5)

## 2020-10-09 PROCEDURE — 84443 ASSAY THYROID STIM HORMONE: CPT

## 2020-10-09 PROCEDURE — 97116 GAIT TRAINING THERAPY: CPT | Mod: GP

## 2020-10-09 PROCEDURE — 97530 THERAPEUTIC ACTIVITIES: CPT | Mod: GP

## 2020-10-09 PROCEDURE — 73030 X-RAY EXAM OF SHOULDER: CPT | Mod: 26,LT

## 2020-10-09 PROCEDURE — 71045 X-RAY EXAM CHEST 1 VIEW: CPT | Mod: 26

## 2020-10-09 PROCEDURE — 83735 ASSAY OF MAGNESIUM: CPT

## 2020-10-09 PROCEDURE — 73020 X-RAY EXAM OF SHOULDER: CPT | Mod: 26,LT

## 2020-10-09 PROCEDURE — 36415 COLL VENOUS BLD VENIPUNCTURE: CPT

## 2020-10-09 PROCEDURE — 73562 X-RAY EXAM OF KNEE 3: CPT | Mod: 26,RT

## 2020-10-09 PROCEDURE — 97161 PT EVAL LOW COMPLEX 20 MIN: CPT | Mod: GP

## 2020-10-09 PROCEDURE — 85027 COMPLETE CBC AUTOMATED: CPT

## 2020-10-09 PROCEDURE — 73030 X-RAY EXAM OF SHOULDER: CPT | Mod: LT

## 2020-10-09 PROCEDURE — U0003: CPT

## 2020-10-09 PROCEDURE — 82607 VITAMIN B-12: CPT

## 2020-10-09 PROCEDURE — 73100 X-RAY EXAM OF WRIST: CPT | Mod: LT

## 2020-10-09 PROCEDURE — 85025 COMPLETE CBC W/AUTO DIFF WBC: CPT

## 2020-10-09 PROCEDURE — 73100 X-RAY EXAM OF WRIST: CPT | Mod: 26,LT

## 2020-10-09 PROCEDURE — 86769 SARS-COV-2 COVID-19 ANTIBODY: CPT

## 2020-10-09 PROCEDURE — 84100 ASSAY OF PHOSPHORUS: CPT

## 2020-10-09 PROCEDURE — 80048 BASIC METABOLIC PNL TOTAL CA: CPT

## 2020-10-09 PROCEDURE — 97110 THERAPEUTIC EXERCISES: CPT | Mod: GP

## 2020-10-09 PROCEDURE — 71045 X-RAY EXAM CHEST 1 VIEW: CPT

## 2020-10-09 PROCEDURE — 99222 1ST HOSP IP/OBS MODERATE 55: CPT

## 2020-10-09 PROCEDURE — 73562 X-RAY EXAM OF KNEE 3: CPT | Mod: RT

## 2020-10-09 RX ORDER — PANTOPRAZOLE SODIUM 20 MG/1
40 TABLET, DELAYED RELEASE ORAL
Refills: 0 | Status: DISCONTINUED | OUTPATIENT
Start: 2020-10-09 | End: 2020-10-09

## 2020-10-09 RX ORDER — HYDROMORPHONE HYDROCHLORIDE 2 MG/ML
0.25 INJECTION INTRAMUSCULAR; INTRAVENOUS; SUBCUTANEOUS ONCE
Refills: 0 | Status: DISCONTINUED | OUTPATIENT
Start: 2020-10-09 | End: 2020-10-09

## 2020-10-09 RX ORDER — BUPROPION HYDROCHLORIDE 150 MG/1
300 TABLET, EXTENDED RELEASE ORAL DAILY
Refills: 0 | Status: DISCONTINUED | OUTPATIENT
Start: 2020-10-09 | End: 2020-10-14

## 2020-10-09 RX ORDER — ACETAMINOPHEN 500 MG
975 TABLET ORAL EVERY 8 HOURS
Refills: 0 | Status: DISCONTINUED | OUTPATIENT
Start: 2020-10-09 | End: 2020-10-14

## 2020-10-09 RX ORDER — FLUTICASONE FUROATE AND VILANTEROL TRIFENATATE 100; 25 UG/1; UG/1
1 POWDER RESPIRATORY (INHALATION)
Qty: 0 | Refills: 0 | DISCHARGE

## 2020-10-09 RX ORDER — THYROID 120 MG
90 TABLET ORAL DAILY
Refills: 0 | Status: DISCONTINUED | OUTPATIENT
Start: 2020-10-09 | End: 2020-10-14

## 2020-10-09 RX ORDER — GLUCAGON INJECTION, SOLUTION 0.5 MG/.1ML
1 INJECTION, SOLUTION SUBCUTANEOUS ONCE
Refills: 0 | Status: DISCONTINUED | OUTPATIENT
Start: 2020-10-09 | End: 2020-10-09

## 2020-10-09 RX ORDER — METFORMIN HYDROCHLORIDE 850 MG/1
1 TABLET ORAL
Qty: 0 | Refills: 0 | DISCHARGE

## 2020-10-09 RX ORDER — THYROID 120 MG
1 TABLET ORAL
Qty: 0 | Refills: 0 | DISCHARGE

## 2020-10-09 RX ORDER — HYDROMORPHONE HYDROCHLORIDE 2 MG/ML
0.5 INJECTION INTRAMUSCULAR; INTRAVENOUS; SUBCUTANEOUS ONCE
Refills: 0 | Status: DISCONTINUED | OUTPATIENT
Start: 2020-10-09 | End: 2020-10-09

## 2020-10-09 RX ORDER — DEXTROSE 50 % IN WATER 50 %
25 SYRINGE (ML) INTRAVENOUS ONCE
Refills: 0 | Status: DISCONTINUED | OUTPATIENT
Start: 2020-10-09 | End: 2020-10-09

## 2020-10-09 RX ORDER — MORPHINE SULFATE 50 MG/1
2 CAPSULE, EXTENDED RELEASE ORAL EVERY 4 HOURS
Refills: 0 | Status: DISCONTINUED | OUTPATIENT
Start: 2020-10-09 | End: 2020-10-09

## 2020-10-09 RX ORDER — LEVOTHYROXINE SODIUM 125 MCG
75 TABLET ORAL DAILY
Refills: 0 | Status: DISCONTINUED | OUTPATIENT
Start: 2020-10-09 | End: 2020-10-09

## 2020-10-09 RX ORDER — SODIUM CHLORIDE 9 MG/ML
1000 INJECTION, SOLUTION INTRAVENOUS
Refills: 0 | Status: DISCONTINUED | OUTPATIENT
Start: 2020-10-09 | End: 2020-10-09

## 2020-10-09 RX ORDER — ATORVASTATIN CALCIUM 80 MG/1
40 TABLET, FILM COATED ORAL DAILY
Refills: 0 | Status: DISCONTINUED | OUTPATIENT
Start: 2020-10-09 | End: 2020-10-09

## 2020-10-09 RX ORDER — INSULIN LISPRO 100/ML
VIAL (ML) SUBCUTANEOUS
Refills: 0 | Status: DISCONTINUED | OUTPATIENT
Start: 2020-10-09 | End: 2020-10-09

## 2020-10-09 RX ORDER — MONTELUKAST 4 MG/1
10 TABLET, CHEWABLE ORAL DAILY
Refills: 0 | Status: DISCONTINUED | OUTPATIENT
Start: 2020-10-09 | End: 2020-10-09

## 2020-10-09 RX ORDER — ACETAMINOPHEN 500 MG
650 TABLET ORAL EVERY 6 HOURS
Refills: 0 | Status: DISCONTINUED | OUTPATIENT
Start: 2020-10-09 | End: 2020-10-14

## 2020-10-09 RX ORDER — BUPROPION HYDROCHLORIDE 150 MG/1
150 TABLET, EXTENDED RELEASE ORAL DAILY
Refills: 0 | Status: DISCONTINUED | OUTPATIENT
Start: 2020-10-09 | End: 2020-10-09

## 2020-10-09 RX ORDER — SODIUM POLYSTYRENE SULFONATE 4.1 MEQ/G
15 POWDER, FOR SUSPENSION ORAL ONCE
Refills: 0 | Status: COMPLETED | OUTPATIENT
Start: 2020-10-09 | End: 2020-10-09

## 2020-10-09 RX ORDER — ALPRAZOLAM 0.25 MG
0.25 TABLET ORAL ONCE
Refills: 0 | Status: DISCONTINUED | OUTPATIENT
Start: 2020-10-09 | End: 2020-10-09

## 2020-10-09 RX ORDER — HYDROMORPHONE HYDROCHLORIDE 2 MG/ML
1 INJECTION INTRAMUSCULAR; INTRAVENOUS; SUBCUTANEOUS EVERY 6 HOURS
Refills: 0 | Status: DISCONTINUED | OUTPATIENT
Start: 2020-10-09 | End: 2020-10-10

## 2020-10-09 RX ORDER — HYDROMORPHONE HYDROCHLORIDE 2 MG/ML
0.5 INJECTION INTRAMUSCULAR; INTRAVENOUS; SUBCUTANEOUS EVERY 4 HOURS
Refills: 0 | Status: DISCONTINUED | OUTPATIENT
Start: 2020-10-09 | End: 2020-10-09

## 2020-10-09 RX ORDER — DEXTROSE 50 % IN WATER 50 %
15 SYRINGE (ML) INTRAVENOUS ONCE
Refills: 0 | Status: DISCONTINUED | OUTPATIENT
Start: 2020-10-09 | End: 2020-10-09

## 2020-10-09 RX ORDER — INSULIN LISPRO 100/ML
VIAL (ML) SUBCUTANEOUS AT BEDTIME
Refills: 0 | Status: DISCONTINUED | OUTPATIENT
Start: 2020-10-09 | End: 2020-10-09

## 2020-10-09 RX ORDER — ASPIRIN/CALCIUM CARB/MAGNESIUM 324 MG
81 TABLET ORAL DAILY
Refills: 0 | Status: DISCONTINUED | OUTPATIENT
Start: 2020-10-09 | End: 2020-10-09

## 2020-10-09 RX ORDER — ALENDRONATE SODIUM 70 MG/1
70 TABLET ORAL
Qty: 0 | Refills: 0 | DISCHARGE

## 2020-10-09 RX ORDER — THYROID 120 MG
90 TABLET ORAL DAILY
Refills: 0 | Status: DISCONTINUED | OUTPATIENT
Start: 2020-10-09 | End: 2020-10-09

## 2020-10-09 RX ORDER — LEVOTHYROXINE SODIUM 125 MCG
1 TABLET ORAL
Qty: 0 | Refills: 0 | DISCHARGE

## 2020-10-09 RX ORDER — SODIUM CHLORIDE 9 MG/ML
1000 INJECTION INTRAMUSCULAR; INTRAVENOUS; SUBCUTANEOUS ONCE
Refills: 0 | Status: COMPLETED | OUTPATIENT
Start: 2020-10-09 | End: 2020-10-09

## 2020-10-09 RX ORDER — ATORVASTATIN CALCIUM 80 MG/1
1 TABLET, FILM COATED ORAL
Qty: 0 | Refills: 0 | DISCHARGE

## 2020-10-09 RX ORDER — TRAMADOL HYDROCHLORIDE 50 MG/1
50 TABLET ORAL EVERY 6 HOURS
Refills: 0 | Status: DISCONTINUED | OUTPATIENT
Start: 2020-10-09 | End: 2020-10-10

## 2020-10-09 RX ORDER — ONDANSETRON 8 MG/1
4 TABLET, FILM COATED ORAL EVERY 6 HOURS
Refills: 0 | Status: DISCONTINUED | OUTPATIENT
Start: 2020-10-09 | End: 2020-10-14

## 2020-10-09 RX ORDER — MONTELUKAST 4 MG/1
1 TABLET, CHEWABLE ORAL
Qty: 0 | Refills: 0 | DISCHARGE

## 2020-10-09 RX ORDER — ACETAMINOPHEN 500 MG
1000 TABLET ORAL ONCE
Refills: 0 | Status: COMPLETED | OUTPATIENT
Start: 2020-10-09 | End: 2020-10-09

## 2020-10-09 RX ORDER — ASPIRIN/CALCIUM CARB/MAGNESIUM 324 MG
81 TABLET ORAL DAILY
Refills: 0 | Status: DISCONTINUED | OUTPATIENT
Start: 2020-10-09 | End: 2020-10-14

## 2020-10-09 RX ORDER — DEXTROSE 50 % IN WATER 50 %
12.5 SYRINGE (ML) INTRAVENOUS ONCE
Refills: 0 | Status: DISCONTINUED | OUTPATIENT
Start: 2020-10-09 | End: 2020-10-09

## 2020-10-09 RX ORDER — PANTOPRAZOLE SODIUM 20 MG/1
1 TABLET, DELAYED RELEASE ORAL
Qty: 0 | Refills: 0 | DISCHARGE

## 2020-10-09 RX ADMIN — SODIUM CHLORIDE 1000 MILLILITER(S): 9 INJECTION INTRAMUSCULAR; INTRAVENOUS; SUBCUTANEOUS at 02:27

## 2020-10-09 RX ADMIN — Medication 1000 MILLIGRAM(S): at 01:54

## 2020-10-09 RX ADMIN — Medication 650 MILLIGRAM(S): at 00:53

## 2020-10-09 RX ADMIN — SODIUM POLYSTYRENE SULFONATE 15 GRAM(S): 4.1 POWDER, FOR SUSPENSION ORAL at 06:13

## 2020-10-09 RX ADMIN — ONDANSETRON 4 MILLIGRAM(S): 8 TABLET, FILM COATED ORAL at 07:26

## 2020-10-09 RX ADMIN — HYDROMORPHONE HYDROCHLORIDE 0.5 MILLIGRAM(S): 2 INJECTION INTRAMUSCULAR; INTRAVENOUS; SUBCUTANEOUS at 09:29

## 2020-10-09 RX ADMIN — MORPHINE SULFATE 2 MILLIGRAM(S): 50 CAPSULE, EXTENDED RELEASE ORAL at 04:22

## 2020-10-09 RX ADMIN — BUPROPION HYDROCHLORIDE 300 MILLIGRAM(S): 150 TABLET, EXTENDED RELEASE ORAL at 13:07

## 2020-10-09 RX ADMIN — HYDROMORPHONE HYDROCHLORIDE 0.5 MILLIGRAM(S): 2 INJECTION INTRAMUSCULAR; INTRAVENOUS; SUBCUTANEOUS at 10:10

## 2020-10-09 RX ADMIN — HYDROMORPHONE HYDROCHLORIDE 0.25 MILLIGRAM(S): 2 INJECTION INTRAMUSCULAR; INTRAVENOUS; SUBCUTANEOUS at 01:20

## 2020-10-09 RX ADMIN — HYDROMORPHONE HYDROCHLORIDE 0.5 MILLIGRAM(S): 2 INJECTION INTRAMUSCULAR; INTRAVENOUS; SUBCUTANEOUS at 13:55

## 2020-10-09 RX ADMIN — HYDROMORPHONE HYDROCHLORIDE 0.5 MILLIGRAM(S): 2 INJECTION INTRAMUSCULAR; INTRAVENOUS; SUBCUTANEOUS at 01:09

## 2020-10-09 RX ADMIN — HYDROMORPHONE HYDROCHLORIDE 0.5 MILLIGRAM(S): 2 INJECTION INTRAMUSCULAR; INTRAVENOUS; SUBCUTANEOUS at 00:39

## 2020-10-09 RX ADMIN — HYDROMORPHONE HYDROCHLORIDE 0.5 MILLIGRAM(S): 2 INJECTION INTRAMUSCULAR; INTRAVENOUS; SUBCUTANEOUS at 15:52

## 2020-10-09 RX ADMIN — Medication 975 MILLIGRAM(S): at 17:38

## 2020-10-09 RX ADMIN — MORPHINE SULFATE 2 MILLIGRAM(S): 50 CAPSULE, EXTENDED RELEASE ORAL at 07:26

## 2020-10-09 RX ADMIN — Medication 400 MILLIGRAM(S): at 01:24

## 2020-10-09 RX ADMIN — HYDROMORPHONE HYDROCHLORIDE 1 MILLIGRAM(S): 2 INJECTION INTRAMUSCULAR; INTRAVENOUS; SUBCUTANEOUS at 21:53

## 2020-10-09 RX ADMIN — Medication 90 MILLIGRAM(S): at 05:05

## 2020-10-09 RX ADMIN — Medication 975 MILLIGRAM(S): at 09:29

## 2020-10-09 RX ADMIN — Medication 81 MILLIGRAM(S): at 13:07

## 2020-10-09 RX ADMIN — Medication 975 MILLIGRAM(S): at 10:10

## 2020-10-09 RX ADMIN — Medication 0.25 MILLIGRAM(S): at 06:18

## 2020-10-09 RX ADMIN — MORPHINE SULFATE 2 MILLIGRAM(S): 50 CAPSULE, EXTENDED RELEASE ORAL at 04:52

## 2020-10-09 RX ADMIN — Medication 650 MILLIGRAM(S): at 05:04

## 2020-10-09 RX ADMIN — SODIUM CHLORIDE 1000 MILLILITER(S): 9 INJECTION INTRAMUSCULAR; INTRAVENOUS; SUBCUTANEOUS at 02:22

## 2020-10-09 RX ADMIN — HYDROMORPHONE HYDROCHLORIDE 0.25 MILLIGRAM(S): 2 INJECTION INTRAMUSCULAR; INTRAVENOUS; SUBCUTANEOUS at 01:50

## 2020-10-09 NOTE — ED ADULT NURSE NOTE - CHIEF COMPLAINT QUOTE
Pt bibems from home s/p unwitnessed mechanical fall. +headstrike unknown LOC, takes a baby aspirin. Pt found on ground by EMS after pt called using her life alert button. Pt awake and alert at this time with obvious contusion to forehead. Pt c/o L shoulder pain. Trauma alert called at 1757.

## 2020-10-09 NOTE — PATIENT PROFILE ADULT - NS PRO AD PATIENT TYPE ON CHART
Medical Orders for Life-Sustaining Treatment (MOLST) Health Care Proxy (HCP)/Do Not Resuscitate (DNR)/Medical Orders for Life-Sustaining Treatment (MOLST)

## 2020-10-09 NOTE — H&P ADULT - NSICDXPASTMEDICALHX_GEN_ALL_CORE_FT
PAST MEDICAL HISTORY:  Hypertension, unspecified type     Hypothyroid     Lower extremity edema     Osteoarthritis, localized Right knee     PAST MEDICAL HISTORY:  Chronic GERD     COPD, mild     H/O diabetes mellitus     Hypothyroid     Osteoarthritis, localized Right knee    Osteoporosis      PAST MEDICAL HISTORY:  Hypertension     Hypothyroid     Osteoarthritis, localized Right knee

## 2020-10-09 NOTE — CHART NOTE - NSCHARTNOTEFT_GEN_A_CORE
Pt seen and examined earlier today. crying in pain. Meds adjusted. d/w son at bedside, pt lives alone. Usually ambulates with 4 pronged cane. Will likely need DAYSI.   Physical therapy to evaluate.   Needs Straight cath for urinary retention (500CC)

## 2020-10-09 NOTE — PHYSICAL THERAPY INITIAL EVALUATION ADULT - PERTINENT HX OF CURRENT PROBLEM, REHAB EVAL
Pt states she was at home walking to turn on the light in a bedroom when she tripped and fell onto her left shoulder. Fall was unwitnessed, states she remembers fall, admits to hitting head but denies LOC, chest pain, dizziness or weakness. Fell & pushed the life alert, EMS was called and she was brought to the ED. Lives alone and ambulates w a cane.

## 2020-10-09 NOTE — H&P ADULT - ATTENDING COMMENTS
Subjective: Patient seen and examined. No new events except as noted.     SUBJECTIVE/ROS:        MEDICATIONS:  MEDICATIONS  (STANDING):  acetaminophen  IVPB .. 1000 milliGRAM(s) IV Intermittent once  cloNIDine 0.2 milliGRAM(s) Oral three times a day  dexAMETHasone  Injectable 2 milliGRAM(s) IV Push every 12 hours  epoetin-norris-epbx (RETACRIT) Injectable 51443 Patient seen examined after initial evaluation above by family medicine resident. Case discussed and reviewed in detail. Please note my plan below       89 y/o F w/ PMH of HTN, hypothyroidism, chronic LE edema, R knee OA, p/w mechanical fall     *L proximal humerus fracture & L distal fracture S/p Mechanical Fall  -Vitamin B12 / TSH   -PT consult (but NWB of LUE)   -Fall precautions  -Pain control  -CTH - negative for acute findings   -Fall precautions   -Patient was evaluated at bedside by ortho    *ANDREZ vs CKD  -Baseline creatinine unclear. Last known creatinine was from 9/15/2019 1.29, and today it is 1.49  -S/p NS 2L in ED, will re-check creatinine in AM   -Avoid nephrotoxic agents   -UA     *Hyperkalemia  -Kayexelate  -EKG Reviewed     *DM2  -Humalog ISS  -Diabetic diet  -HbA1c    *H/o hypothyroidism / OA / LE edema  -C/w home meds and f/u outpatient for further management     *Medication reconciliation  -Reconciled based on Travis, as patient doesn't know all meds. Will need to confirm in AM    *DVT Ppx  -SCDs Patient seen examined after initial evaluation above by family medicine resident. Case discussed and reviewed in detail. Please note my plan below       87 y/o F w/ PMH of HTN, hypothyroidism, chronic LE edema, R knee OA, p/w mechanical fall     *L proximal humerus fracture & L distal radius fracture S/p Mechanical Fall  -Vitamin B12 / TSH   -PT consult (but NWB of LUE)   -Fall precautions  -Pain control  -CTH - negative for acute findings   -Fall precautions   -Patient was evaluated at bedside by ortho    *ANDREZ vs CKD  -Baseline creatinine unclear. Last known creatinine was from 9/15/2019 1.29, and today it is 1.49  -S/p NS 2L in ED, will re-check creatinine in AM   -Avoid nephrotoxic agents   -UA     *Hyperkalemia  -Kayexelate  -EKG Reviewed     *DM2  -Humalog ISS  -Diabetic diet  -HbA1c    *H/o hypothyroidism / OA / LE edema  -C/w home meds and f/u outpatient for further management     *Medication reconciliation  -Reconciled based on Travis, as patient doesn't know all meds. Will need to confirm in AM    *DVT Ppx  -SCDs Patient seen examined after initial evaluation above by famly medicine resident. Case discussed and reviwed in detail. Please note my plan below       89 y/o F w/ PMH of HTN, hypothyroidism, chronic LE edema, R knee OA, p/w mechanical fall     *L proximal humerus fracture & L distal fracture S/p Mechanical Fall  -Vitamin B12 / TSH   -PT consult (but NWB of LUE)   -Fall precautions  -Pain control  -CTH - negative for acute findings   -Fall precuations   -Patient was evaluated at bedside by ortho    *ANDREZ vs CKD  -Baseline creatinine unclear. Last known creatinine was from 9/15/2019 1.29, and today it is 1.49  -S/p NS 2L in ED, will re-check creatinine in AM   -Will hold diuretics temporarily   -Avoid nephrotoxic agents   -UA   -Hold ACEi     *Hyperkalemia  -Kayexelate  -EKG Reviewed   -Hold lisinopril  / Aldactone     *H/o hypothyroidism / OA / LE edema  -C/w home meds and f/u outpatient for further management     *Medication reconcilliation  -Reconcilled based on DrFirst, as patient doesn't know all meds. Will need to confirm in AM    *DVT Ppx  -SCDs

## 2020-10-09 NOTE — PATIENT PROFILE ADULT - NSPROPTRIGHTBILLOFRIGHTS_GEN_A_NUR
[place]/daughter/legal release contacted with patient.  She felt dizzy, weak, trembling.  Her pulse rate yesterday morning was 52 b/p 173/86 then later in the day 145/80 pulse 54.  Patient stopped taking the coreg 12.5mg po Saturday evening.  She has not had any since.  Dr. Toscano notified via phone today   Per Dr. Toscano stop coreg and add coreg and metoprolol to sensitivities.  Bradycardia.   Start Norvasc 10 mg po daily.  30 tabs with 11 refills.  If she tolerates it well the script can be switched to 90 days.  Daughter Apolonia contacted and read back new instructions.  Requested script to go to [facility].  MR up dated as well as allergy list.  FW to Dr. Toscano please confirm the norvasc order.    patient

## 2020-10-09 NOTE — H&P ADULT - HISTORY OF PRESENT ILLNESS
89 yo F w PMH of HTN, hypothyroidism, chronic lower extremity edema & Right knee OA brought into the ED by EMS due to fall. Pt states she was at home walking to turn on the light in a bedroom when she tripped and fell onto her left shoulder. Fall was unwitnessed, states she remembers fall, admits to hitting head but denies LOC, chest pain, dizziness or weakness. Fell & pushed the life alert, EMS was called and she was brought to the ED. Lives alone and ambulates w a cane. Elicits left shoulder arm pain, denies chest pain, palpitations, SOB, dizziness or lethargy.   In the ED pt was 2LNS, Tylenol & Dilaudid. Ortho was consulted and assess pt in ED.   Last admission was in Sept 14, 2019 for upper back pain, was found to have T6 fracture from fall back in August 2019. 87 yo F w PMH of DMII, COPD, GERD, hypothyroidism, Right knee OA & osteoporosis brought into the ED by EMS due to fall. Pt states she was at home walking to turn on the light in a bedroom when she tripped and fell onto her left shoulder. Fall was unwitnessed, states she remembers fall, admits to hitting head but denies LOC, chest pain, dizziness or weakness. Fell & pushed the life alert, EMS was called and she was brought to the ED. Lives alone and ambulates w a cane. Elicits left shoulder arm pain, denies chest pain, palpitations, SOB, dizziness or lethargy.   In the ED pt was 2LNS, Tylenol & Dilaudid. Ortho was consulted and assess pt in ED.   Last admission was in Sept 14, 2019 for upper back pain, was found to have T6 fracture from fall back in August 2019. 89 yo F w PMH of HTN, hypothyroidism & Right knee OA brought into the ED by EMS due to fall. Pt states she was at home walking to turn on the light in a bedroom when she tripped and fell onto her left shoulder. Fall was unwitnessed, states she remembers fall, admits to hitting head but denies LOC, chest pain, dizziness or weakness. Fell & pushed the life alert, EMS was called and she was brought to the ED. Lives alone and ambulates w a cane. Elicits left shoulder arm pain, denies chest pain, palpitations, SOB, dizziness or lethargy.   In the ED pt was 2LNS, Tylenol & Dilaudid. Ortho was consulted and assess pt in ED.   Last admission was in Sept 14, 2019 for upper back pain, was found to have T6 fracture from fall back in August 2019.

## 2020-10-09 NOTE — ED ADULT NURSE NOTE - NSIMPLEMENTINTERV_GEN_ALL_ED
Implemented All Fall with Harm Risk Interventions:  [place] to call system. Call bell, personal items and telephone within reach. Instruct patient to call for assistance. Room bathroom lighting operational. Non-slip footwear when patient is off stretcher. Physically safe environment: no spills, clutter or unnecessary equipment. Stretcher in lowest position, wheels locked, appropriate side rails in place. Provide visual cue, wrist band, yellow gown, etc. Monitor gait and stability. Monitor for mental status changes and reorient to person, place, and time. Review medications for side effects contributing to fall risk. Reinforce activity limits and safety measures with patient and family. Provide visual clues: red socks.

## 2020-10-09 NOTE — CONSULT NOTE ADULT - ASSESSMENT
A/P:  Patient is a 88y Female w/ left proximal humerus fracture and left distal radius fracture s/p closed reduction and splinting in ed    -Multimodal Analgesia - recommend low dose opioids and acetaminophen as tolerated  -NWB, LUE in splint and sling  -Pt advised to remove all jewelry from affected limb.  -Strict Ice/Elevation to help with swelling  -Keep splint clean/dry/intact;  -Encourage active finger motion to help with swelling  -Pt aware of possible need for surgical intervention of distal radius fracture. Will FU as outpatient  -Pt made aware of signs and symptoms of compartment syndrome and acute carpal tunnel syndrome. Aware of need to return to ED if symptoms develop.  -Recommend Ca & Vit D supplementation  -Recommend DEXA scan/Osteoporosis workup outpatient and treatment as needed  -Recommend follow up w/ outpatient endocrinologist   -No acute ortho hand/UE surgical intervention planned at this time  -Orthopaedically stable  -Recommend follow up w/ Dr. Nowak in 2-3 days outpatient. Call office to schedule appointment.  -All Patient's and Family Member's questions answered. Patient/family understand and agree w/ plan.  -Imaging and clinical presentation discussed w/ Dr. Nowak who is aware and agrees w/ above plan.    Leonardo Coelho M.D.   Orthopaedic Surgery  [phone]

## 2020-10-09 NOTE — H&P ADULT - NSHPSOCIALHISTORY_GEN_ALL_CORE
Lives alone, has HHA that comes on Tuesday & Friday. Admits to an occasional glass of wine. Denies smoking or drug use.

## 2020-10-09 NOTE — CONSULT NOTE ADULT - SUBJECTIVE AND OBJECTIVE BOX
Patient is a 88yFemale RHD community ambulator without assistive devices (Cane in right hand) who presents to [facility] ED w/ a c/o of left shoulder pain s/p mf. Patient states she slipped and fell onto left shoulder/arm earlier today. Reports + HS but denies LOC. Localizing pain to left shoulder and wrist, denies radiation of pain elsewhere. States has not made any attempts to ambulate since the injury. Denies any numbness or tingling. Denies having any other pain elsewhere. Reports has received recent right knee injections for OA. No other orthopaedic concerns at this time.    PAST MEDICAL & SURGICAL HISTORY:  Hypothyroid    Hypertension, unspecified type    History of cholecystectomy    History of appendectomy        Medrol (Other)  oxycodone (Hives; [place])  predniSONE (Other)  sulfa drugs (Other)      PHYSICAL EXAM:  T(C): 36.1 (10-08-20 @ 22:33), Max: 36.6 (10-08-20 @ 22:21)  HR: 120 (10-09-20 @ 00:37) (100 - 120)  BP: 122/50 (10-09-20 @ 00:37) (122/50 - 150/73)  RR: 18 (10-09-20 @ 00:37) (17 - 18)  SpO2: 97% (10-09-20 @ 00:37) (97% - 97%)    Gen: NAD, Resting, following commands  R forehead bruising  RIGHT Upper Extremity:   Skin intact, no erythema, ecchymosis, edema, or gross deformity  +TTP about the distal radius and shoulder. NonTTP over the bony prominences of the elbow/hand/fingers  Painless passive/active ROM of the elbow; decreased shoulder/wrist rom 2/2 pain  C5-T1 SILT  + axillary/musculocutaneous/radial/median/ulnar/AIN/PIN nerves  + radial pulse  Compartments soft and compressible    Secondary Survey:   +TTP about the lateral aspect of right knee.  Negative log roll BL  Negative heelstrike BL  Able to SLR BL  No TTP over bony prominences, SILT, palpable pulses, full/painless A/PROM, compartments soft. No TTP over spinous processes or paraspinal muscles at C/T/L spine. No palpable step off. No other injuries or complaints.    Imaging:  XR L shoulder/hum/forearm/wrist demonstrates at least 3 part impacted proximal humerus fracture, impacted and volarly displaced distal radius fracture, no other obvious fx/dsxn appreciated    Procedure:  Under aseptic conditions, a hematoma block was administered to the fracture site using 10cc of 1% lidocaine. Closed reduction was performed and a well molded, well padded plaster splint was applied. The patient tolerated the procedure well and there we no complications. The patient's post-reduction neurovascular exam was unchanged. Post-reduction xrays demonstrated acceptable alignment.

## 2020-10-09 NOTE — PATIENT PROFILE ADULT - SW.
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 Pt hit on l side of head over l ear. Blood noted in L ear, pt denies loc. Alert and oriented.  Ambulated from EMS
social work

## 2020-10-09 NOTE — ED ADULT NURSE NOTE - OBJECTIVE STATEMENT
pt. BIBA from home sp unwitnessed, mechanical fall, pt. recall reaching over for an object and lost balance. found on the floor by EMs after calling life alert button. (-) LOC. able to move R upper and Bl lower extremities. unable to move L arm due to 10/10 pain. hematoma to R forehead, and R shoulder. pt. on ASA. no other complains at this moment.

## 2020-10-09 NOTE — H&P ADULT - ASSESSMENT
89 yo F w PMH of HTN, hypothyroidism, chronic lower extremity edema & Right knee OA brought into the ED by EMS due to fall. Fell on left shoulder, X ray suggestive of Left proximal humerus fracture & volarly displaced distal radial fracture.     #LUE proximal humeral fracture & distal radial fracture  -Seen and evaluated by Ortho in ED. S/p closed reduction, LUE in plaster splint  -Elevate & ice for swelling  -PRN pain meds  -F/U w Ortho in the outpatient setting    #Fall  Pt remembers fall, denies syncope, chest pain or palpitations. Most likely mechanical in nature.   -PT evaluation  -Fall precautions    #Hyperkalemia  Denies chest pain or palpitations, EKG shows sinus rhythm  -Kayaxalate?  -Hold home lisinopril & spironolactone due to adverse effect of hyperkalemia  -F/U AM BMP    #ANDREZ  Cr 1.49, elevated from previous admission. Likely pre-renal 2/2 to hypoperfusion  -Start  cc/hr  -Hold home furosemide  -Avoid nephrotoxic agents  -Trend Cr    #HTN  /73  -Antihypertensives currently on hold  -Consider starting if BP elevated  -Monitor vitals    #Chronic LE edema  2+ pitting edema on exam, states is chronic in nature  -Diuretics on hold due to hyperkalemic effects  -Continue to monitor    #Diet  -Regular    #DVT PPX  IMPROVE VTE Individual Risk Assessment  -Place on SCD's    RISK                                                                Points    [  ] Previous VTE                                                  3    [  ] Thrombophilia                                               2    [  ] Lower limb paralysis                                      2        (unable to hold up >15 seconds)      [  ] Current Cancer                                              2         (within 6 months)    [  ] Immobilization > 24 hrs                                1    [  ] ICU/CCU stay > 24 hours                              1    [ x ] Age > 60                                                      1    IMPROVE VTE Score ___1______    IMPROVE Score 0-1: Low Risk, No VTE prophylaxis required for most patients, encourage ambulation.   IMPROVE Score 2-3: At risk, pharmacologic VTE prophylaxis is indicated for most patients (in the absence of a contraindication)  IMPROVE Score > or = 4: High Risk, pharmacologic VTE prophylaxis is indicated for most patients (in the absence of a contraindication)    #Advanced Directives  -DNR/DNI    Case discussed w Dr. Schwarz         89 yo F w PMH of HTN, hypothyroidism, chronic lower extremity edema & Right knee OA brought into the ED by EMS due to fall. Fell on left shoulder, X ray suggestive of Left proximal humerus fracture & volarly displaced distal radial fracture.     #LUE proximal humeral fracture & distal radial fracture  -Seen and evaluated by Ortho in ED. S/p closed reduction, LUE in plaster splint  -Elevate & ice for swelling  -PRN pain meds  -F/U w Ortho in the outpatient setting    #Fall  Pt remembers fall, denies syncope, chest pain or palpitations. Most likely mechanical in nature.   -PT evaluation  -Fall precautions    #Hyperkalemia  Denies chest pain or palpitations, EKG shows sinus rhythm  -Kayaxalate  -F/U AM BMP    #ANDREZ  Cr 1.49, elevated from previous admission.   -Avoid nephrotoxic agents  -Trend Cr    #HTN  /73  -Antihypertensives currently on hold  -Consider starting if BP elevated  -Monitor vitals    #Chronic LE edema  2+ pitting edema on exam, states is chronic in nature  -Continue to monitor    #Diet  -Regular    #DVT PPX  IMPROVE VTE Individual Risk Assessment  -Place on SCD's    RISK                                                                Points    [  ] Previous VTE                                                  3    [  ] Thrombophilia                                               2    [  ] Lower limb paralysis                                      2        (unable to hold up >15 seconds)      [  ] Current Cancer                                              2         (within 6 months)    [  ] Immobilization > 24 hrs                                1    [  ] ICU/CCU stay > 24 hours                              1    [ x ] Age > 60                                                      1    IMPROVE VTE Score ___1______    IMPROVE Score 0-1: Low Risk, No VTE prophylaxis required for most patients, encourage ambulation.   IMPROVE Score 2-3: At risk, pharmacologic VTE prophylaxis is indicated for most patients (in the absence of a contraindication)  IMPROVE Score > or = 4: High Risk, pharmacologic VTE prophylaxis is indicated for most patients (in the absence of a contraindication)    #Advanced Directives  -DNR/DNI    Case discussed w Dr. Schwarz         89 yo F w PMH of DMII, COPD, GERD, hypothyroidism, Right knee OA & osteoporosis brought into the ED by EMS due to fall. Fell on left shoulder, X ray suggestive of Left proximal humerus fracture & volarly displaced distal radial fracture.     #LUE proximal humeral fracture & distal radial fracture  -Seen and evaluated by Ortho in ED. S/p closed reduction, LUE in plaster splint  -Elevate & ice for swelling  -PRN pain meds  -F/U w Ortho in the outpatient setting    #Fall  Pt remembers fall, denies syncope, chest pain or palpitations. Most likely mechanical in nature.   -PT evaluation  -Fall precautions  -F/U AM labs    #Hyperkalemia  Denies chest pain or palpitations, EKG shows sinus rhythm  -Given Kayexalate 15mg  -F/U AM BMP    #ANDREZ  Cr 1.49, elevated from previous admission.   -Avoid nephrotoxic agents  -Trend Cr    #DMII  -Started on ISS  -Monitor BS    #GERD  -Continue home pantoprazole 40mg    #Diet  -Carb Control Diet    #DVT PPX  IMPROVE VTE Individual Risk Assessment  -Place on SCD's    RISK                                                                Points    [  ] Previous VTE                                                  3    [  ] Thrombophilia                                               2    [  ] Lower limb paralysis                                      2        (unable to hold up >15 seconds)      [  ] Current Cancer                                              2         (within 6 months)    [  ] Immobilization > 24 hrs                                1    [  ] ICU/CCU stay > 24 hours                              1    [ x ] Age > 60                                                      1    IMPROVE VTE Score ___1______    IMPROVE Score 0-1: Low Risk, No VTE prophylaxis required for most patients, encourage ambulation.   IMPROVE Score 2-3: At risk, pharmacologic VTE prophylaxis is indicated for most patients (in the absence of a contraindication)  IMPROVE Score > or = 4: High Risk, pharmacologic VTE prophylaxis is indicated for most patients (in the absence of a contraindication)    #Advanced Directives  -DNR/DNI    Case discussed w Dr. Schwarz         87 yo F w PMH of DMII, COPD, GERD, hypothyroidism, Right knee OA & osteoporosis brought into the ED by EMS due to fall. Fell on left shoulder, X ray suggestive of Left proximal humerus fracture & volarly displaced distal radial fracture.     #LUE proximal humeral fracture & distal radial fracture  -Seen and evaluated by Ortho in ED. S/p closed reduction, LUE in plaster splint  -Elevate & ice for swelling  -PRN pain meds  -F/U w Ortho in the outpatient setting    #Fall  Pt remembers fall, denies syncope, chest pain or palpitations. Most likely mechanical in nature.   -PT evaluation  -Fall precautions  -F/U AM labs    #Hyperkalemia  Denies chest pain or palpitations, EKG shows sinus rhythm  -Given Kayexalate 15mg  -F/U AM BMP    #ANDREZ  Cr 1.49, elevated from previous admission.   -Avoid nephrotoxic agents  -Trend Cr    #DMII  -Started on ISS  -Monitor BS    #GERD  -Continue home pantoprazole 40mg    #COPD  Not acute SOB  -Continue on home Montelukast    #Diet  -Carb Control Diet    #DVT PPX  IMPROVE VTE Individual Risk Assessment  -Place on SCD's    RISK                                                                Points    [  ] Previous VTE                                                  3    [  ] Thrombophilia                                               2    [  ] Lower limb paralysis                                      2        (unable to hold up >15 seconds)      [  ] Current Cancer                                              2         (within 6 months)    [  ] Immobilization > 24 hrs                                1    [  ] ICU/CCU stay > 24 hours                              1    [ x ] Age > 60                                                      1    IMPROVE VTE Score ___1______    IMPROVE Score 0-1: Low Risk, No VTE prophylaxis required for most patients, encourage ambulation.   IMPROVE Score 2-3: At risk, pharmacologic VTE prophylaxis is indicated for most patients (in the absence of a contraindication)  IMPROVE Score > or = 4: High Risk, pharmacologic VTE prophylaxis is indicated for most patients (in the absence of a contraindication)    #Advanced Directives  -DNR/DNI    Case discussed w Dr. Schwarz         89 yo F w PMH of DMII, COPD, GERD, hypothyroidism, Right knee OA & osteoporosis brought into the ED by EMS due to fall. Fell on left shoulder, X ray suggestive of Left proximal humerus fracture & volarly displaced distal radial fracture.     #LUE proximal humeral fracture & distal radial fracture  -Seen and evaluated by Ortho in ED. S/p closed reduction, LUE in plaster splint  -Elevate & ice for swelling  -PRN pain meds  -F/U w Ortho in the outpatient setting    #Fall  Pt remembers fall, denies syncope, chest pain or palpitations. Most likely mechanical in nature.   -PT evaluation  -Fall precautions  -F/U AM labs    #Hyperkalemia  Denies chest pain or palpitations, EKG shows sinus rhythm  -Given Kayexalate 15mg  -F/U AM BMP  -Hold Lisinopril / Aldactone     #ANDREZ  Cr 1.49, elevated from previous admission.   -Avoid nephrotoxic agents  -Trend Cr  -Diuretics held     #Diet  -Carb Control Diet    #DVT PPX  IMPROVE VTE Individual Risk Assessment  -Place on SCD's    RISK                                                                Points    [  ] Previous VTE                                                  3    [  ] Thrombophilia                                               2    [  ] Lower limb paralysis                                      2        (unable to hold up >15 seconds)      [  ] Current Cancer                                              2         (within 6 months)    [  ] Immobilization > 24 hrs                                1    [  ] ICU/CCU stay > 24 hours                              1    [ x ] Age > 60                                                      1    IMPROVE VTE Score ___1______    IMPROVE Score 0-1: Low Risk, No VTE prophylaxis required for most patients, encourage ambulation.   IMPROVE Score 2-3: At risk, pharmacologic VTE prophylaxis is indicated for most patients (in the absence of a contraindication)  IMPROVE Score > or = 4: High Risk, pharmacologic VTE prophylaxis is indicated for most patients (in the absence of a contraindication)    #Advanced Directives  -DNR/DNI    Case discussed w Dr. Schwarz         87 yo F w PMH of HTN, hypothyroidism & Right knee OA brought into the ED by EMS due to fall. Fell on left shoulder, X ray suggestive of Left proximal humerus fracture & volarly displaced distal radial fracture.     #LUE proximal humeral fracture & distal radial fracture  -Seen and evaluated by Ortho in ED. S/p closed reduction, LUE in plaster splint  -Elevate & ice for swelling  -PRN pain meds  -F/U w Ortho in the outpatient setting    #Fall  Pt remembers fall, denies syncope, chest pain or palpitations. Most likely mechanical in nature.   -PT evaluation  -Fall precautions  -F/U AM labs    #Hyperkalemia  Denies chest pain or palpitations, EKG shows sinus rhythm  -Given Kayexalate 15mg  -F/U AM BMP  -Hold Lisinopril / Aldactone     #ANDREZ  Cr 1.49, elevated from previous admission.   -Avoid nephrotoxic agents  -Trend Cr  -Diuretics held     #Hypothyroidism  -Continue home [place] Thyroid 90mg    #Diet  -Regular Diet    #DVT PPX  IMPROVE VTE Individual Risk Assessment  -Place on SCD's    RISK                                                                Points    [  ] Previous VTE                                                  3    [  ] Thrombophilia                                               2    [  ] Lower limb paralysis                                      2        (unable to hold up >15 seconds)      [  ] Current Cancer                                              2         (within 6 months)    [  ] Immobilization > 24 hrs                                1    [  ] ICU/CCU stay > 24 hours                              1    [ x ] Age > 60                                                      1    IMPROVE VTE Score ___1______    IMPROVE Score 0-1: Low Risk, No VTE prophylaxis required for most patients, encourage ambulation.   IMPROVE Score 2-3: At risk, pharmacologic VTE prophylaxis is indicated for most patients (in the absence of a contraindication)  IMPROVE Score > or = 4: High Risk, pharmacologic VTE prophylaxis is indicated for most patients (in the absence of a contraindication)    #Advanced Directives  -DNR/DNI    Case discussed w Dr. Schwarz

## 2020-10-09 NOTE — ED ADULT NURSE REASSESSMENT NOTE - NS ED NURSE REASSESS COMMENT FT1
Received pt from previous nurse. Pt is A&OX4 but forget at time. VSS on 2L of O2. Pt was tearful in pain, medicated as prescribed. PT came to work with pt, pt refused at this time. Pt is going to 2SW. Safety and comfort measures in place. Hourly rounding will be done on my time. Will continue to monitor.

## 2020-10-10 LAB
ANION GAP SERPL CALC-SCNC: 4 MMOL/L — LOW (ref 5–17)
BASOPHILS # BLD AUTO: 0.02 K/UL — SIGNIFICANT CHANGE UP (ref 0–0.2)
BASOPHILS NFR BLD AUTO: 0.4 % — SIGNIFICANT CHANGE UP (ref 0–2)
BUN SERPL-MCNC: 21 MG/DL — SIGNIFICANT CHANGE UP (ref 7–23)
CALCIUM SERPL-MCNC: 8.8 MG/DL — SIGNIFICANT CHANGE UP (ref 8.5–10.1)
CHLORIDE SERPL-SCNC: 108 MMOL/L — SIGNIFICANT CHANGE UP (ref 96–108)
CO2 SERPL-SCNC: 27 MMOL/L — SIGNIFICANT CHANGE UP (ref 22–31)
CREAT SERPL-MCNC: 0.96 MG/DL — SIGNIFICANT CHANGE UP (ref 0.5–1.3)
EOSINOPHIL # BLD AUTO: 0.1 K/UL — SIGNIFICANT CHANGE UP (ref 0–0.5)
EOSINOPHIL NFR BLD AUTO: 1.9 % — SIGNIFICANT CHANGE UP (ref 0–6)
GLUCOSE SERPL-MCNC: 95 MG/DL — SIGNIFICANT CHANGE UP (ref 70–99)
HCT VFR BLD CALC: 31.2 % — LOW (ref 34.5–45)
HGB BLD-MCNC: 9.8 G/DL — LOW (ref 11.5–15.5)
IMM GRANULOCYTES NFR BLD AUTO: 0.4 % — SIGNIFICANT CHANGE UP (ref 0–1.5)
LYMPHOCYTES # BLD AUTO: 1.12 K/UL — SIGNIFICANT CHANGE UP (ref 1–3.3)
LYMPHOCYTES # BLD AUTO: 21 % — SIGNIFICANT CHANGE UP (ref 13–44)
MAGNESIUM SERPL-MCNC: 1.9 MG/DL — SIGNIFICANT CHANGE UP (ref 1.6–2.6)
MCHC RBC-ENTMCNC: 29.1 PG — SIGNIFICANT CHANGE UP (ref 27–34)
MCHC RBC-ENTMCNC: 31.4 GM/DL — LOW (ref 32–36)
MCV RBC AUTO: 92.6 FL — SIGNIFICANT CHANGE UP (ref 80–100)
MONOCYTES # BLD AUTO: 0.59 K/UL — SIGNIFICANT CHANGE UP (ref 0–0.9)
MONOCYTES NFR BLD AUTO: 11 % — SIGNIFICANT CHANGE UP (ref 2–14)
NEUTROPHILS # BLD AUTO: 3.49 K/UL — SIGNIFICANT CHANGE UP (ref 1.8–7.4)
NEUTROPHILS NFR BLD AUTO: 65.3 % — SIGNIFICANT CHANGE UP (ref 43–77)
PHOSPHATE SERPL-MCNC: 3.2 MG/DL — SIGNIFICANT CHANGE UP (ref 2.5–4.5)
PLATELET # BLD AUTO: 194 K/UL — SIGNIFICANT CHANGE UP (ref 150–400)
POTASSIUM SERPL-MCNC: 4.6 MMOL/L — SIGNIFICANT CHANGE UP (ref 3.5–5.3)
POTASSIUM SERPL-SCNC: 4.6 MMOL/L — SIGNIFICANT CHANGE UP (ref 3.5–5.3)
RBC # BLD: 3.37 M/UL — LOW (ref 3.8–5.2)
RBC # FLD: 12.4 % — SIGNIFICANT CHANGE UP (ref 10.3–14.5)
SODIUM SERPL-SCNC: 139 MMOL/L — SIGNIFICANT CHANGE UP (ref 135–145)
WBC # BLD: 5.34 K/UL — SIGNIFICANT CHANGE UP (ref 3.8–10.5)
WBC # FLD AUTO: 5.34 K/UL — SIGNIFICANT CHANGE UP (ref 3.8–10.5)

## 2020-10-10 PROCEDURE — 99233 SBSQ HOSP IP/OBS HIGH 50: CPT

## 2020-10-10 RX ORDER — SODIUM CHLORIDE 9 MG/ML
1000 INJECTION, SOLUTION INTRAVENOUS
Refills: 0 | Status: DISCONTINUED | OUTPATIENT
Start: 2020-10-10 | End: 2020-10-14

## 2020-10-10 RX ORDER — TRAMADOL HYDROCHLORIDE 50 MG/1
25 TABLET ORAL EVERY 4 HOURS
Refills: 0 | Status: DISCONTINUED | OUTPATIENT
Start: 2020-10-10 | End: 2020-10-11

## 2020-10-10 RX ORDER — SODIUM CHLORIDE 9 MG/ML
500 INJECTION INTRAMUSCULAR; INTRAVENOUS; SUBCUTANEOUS ONCE
Refills: 0 | Status: COMPLETED | OUTPATIENT
Start: 2020-10-10 | End: 2020-10-10

## 2020-10-10 RX ORDER — BETHANECHOL CHLORIDE 25 MG
25 TABLET ORAL EVERY 8 HOURS
Refills: 0 | Status: DISCONTINUED | OUTPATIENT
Start: 2020-10-10 | End: 2020-10-14

## 2020-10-10 RX ORDER — POLYETHYLENE GLYCOL 3350 17 G/17G
17 POWDER, FOR SOLUTION ORAL
Refills: 0 | Status: DISCONTINUED | OUTPATIENT
Start: 2020-10-10 | End: 2020-10-14

## 2020-10-10 RX ORDER — SENNA PLUS 8.6 MG/1
2 TABLET ORAL AT BEDTIME
Refills: 0 | Status: DISCONTINUED | OUTPATIENT
Start: 2020-10-10 | End: 2020-10-14

## 2020-10-10 RX ORDER — TRAMADOL HYDROCHLORIDE 50 MG/1
50 TABLET ORAL EVERY 4 HOURS
Refills: 0 | Status: DISCONTINUED | OUTPATIENT
Start: 2020-10-10 | End: 2020-10-11

## 2020-10-10 RX ADMIN — POLYETHYLENE GLYCOL 3350 17 GRAM(S): 17 POWDER, FOR SOLUTION ORAL at 21:15

## 2020-10-10 RX ADMIN — ONDANSETRON 4 MILLIGRAM(S): 8 TABLET, FILM COATED ORAL at 11:44

## 2020-10-10 RX ADMIN — TRAMADOL HYDROCHLORIDE 50 MILLIGRAM(S): 50 TABLET ORAL at 01:14

## 2020-10-10 RX ADMIN — Medication 975 MILLIGRAM(S): at 00:51

## 2020-10-10 RX ADMIN — SENNA PLUS 2 TABLET(S): 8.6 TABLET ORAL at 21:15

## 2020-10-10 RX ADMIN — TRAMADOL HYDROCHLORIDE 50 MILLIGRAM(S): 50 TABLET ORAL at 08:32

## 2020-10-10 RX ADMIN — Medication 975 MILLIGRAM(S): at 10:36

## 2020-10-10 RX ADMIN — HYDROMORPHONE HYDROCHLORIDE 1 MILLIGRAM(S): 2 INJECTION INTRAMUSCULAR; INTRAVENOUS; SUBCUTANEOUS at 11:35

## 2020-10-10 RX ADMIN — Medication 25 MILLIGRAM(S): at 21:15

## 2020-10-10 RX ADMIN — TRAMADOL HYDROCHLORIDE 25 MILLIGRAM(S): 50 TABLET ORAL at 21:00

## 2020-10-10 RX ADMIN — SODIUM CHLORIDE 250 MILLILITER(S): 9 INJECTION INTRAMUSCULAR; INTRAVENOUS; SUBCUTANEOUS at 14:05

## 2020-10-10 RX ADMIN — SODIUM CHLORIDE 75 MILLILITER(S): 9 INJECTION, SOLUTION INTRAVENOUS at 16:20

## 2020-10-10 RX ADMIN — HYDROMORPHONE HYDROCHLORIDE 1 MILLIGRAM(S): 2 INJECTION INTRAMUSCULAR; INTRAVENOUS; SUBCUTANEOUS at 11:20

## 2020-10-10 RX ADMIN — Medication 975 MILLIGRAM(S): at 01:05

## 2020-10-10 RX ADMIN — Medication 975 MILLIGRAM(S): at 17:04

## 2020-10-10 RX ADMIN — Medication 90 MILLIGRAM(S): at 05:14

## 2020-10-10 RX ADMIN — HYDROMORPHONE HYDROCHLORIDE 1 MILLIGRAM(S): 2 INJECTION INTRAMUSCULAR; INTRAVENOUS; SUBCUTANEOUS at 05:55

## 2020-10-10 RX ADMIN — Medication 81 MILLIGRAM(S): at 09:36

## 2020-10-10 RX ADMIN — TRAMADOL HYDROCHLORIDE 50 MILLIGRAM(S): 50 TABLET ORAL at 09:30

## 2020-10-10 RX ADMIN — BUPROPION HYDROCHLORIDE 300 MILLIGRAM(S): 150 TABLET, EXTENDED RELEASE ORAL at 09:36

## 2020-10-10 RX ADMIN — Medication 25 MILLIGRAM(S): at 16:17

## 2020-10-10 RX ADMIN — TRAMADOL HYDROCHLORIDE 25 MILLIGRAM(S): 50 TABLET ORAL at 20:20

## 2020-10-10 RX ADMIN — Medication 975 MILLIGRAM(S): at 09:36

## 2020-10-10 RX ADMIN — TRAMADOL HYDROCHLORIDE 50 MILLIGRAM(S): 50 TABLET ORAL at 18:20

## 2020-10-10 RX ADMIN — TRAMADOL HYDROCHLORIDE 50 MILLIGRAM(S): 50 TABLET ORAL at 17:21

## 2020-10-10 RX ADMIN — SODIUM CHLORIDE 75 MILLILITER(S): 9 INJECTION, SOLUTION INTRAVENOUS at 17:06

## 2020-10-10 NOTE — PROGRESS NOTE ADULT - SUBJECTIVE AND OBJECTIVE BOX
CC- s/p mechanical fall    HPI:  87 yo F w PMH of HTN, hypothyroidism & Right knee OA brought into the ED by EMS due to fall. Pt states she was at home walking to turn on the light in a bedroom when she tripped and fell onto her left shoulder. Fall was unwitnessed, states she remembers fall, admits to hitting head but denies LOC, chest pain, dizziness or weakness. Fell & pushed the life alert, EMS was called and she was brought to the ED. Lives alone and ambulates w a cane. Elicits left shoulder arm pain, denies chest pain, palpitations, SOB, dizziness or lethargy.   In the ED pt was 2LNS, Tylenol & Dilaudid. Ortho was consulted and assess pt in ED.   Last admission was in 2019 for upper back pain, was found to have T6 fracture from fall back in 2019.  (09 Oct 2020 03:45)    10/10- was c/p pain earlier and was medicated by dilaudid. Subsequently, became slightly hypotensive.    Review of system- All 10 systems reviewed and is as per HPI otherwise negative.     T(C): 36.4 (10-10-20 @ 07:47), Max: 36.4 (10-09-20 @ 15:40)  HR: 88 (10-10-20 @ 11:18) (88 - 97)  BP: 82/55 (10-10-20 @ 13:33) (80/36 - 111/50)  RR: 17 (10-10-20 @ 07:47) (17 - 18)  SpO2: 95% (10-10-20 @ 07:47) (95% - 98%)  Wt(kg): --    LABS:                        9.8    5.34  )-----------( 194      ( 10 Oct 2020 08:23 )             31.2     10-10    139  |  108  |  21  ----------------------------<  95  4.6   |  27  |  0.96    Ca    8.8      10 Oct 2020 08:23  Phos  3.2     10-10  Mg     1.9     10-10    TPro  6.9  /  Alb  3.5  /  TBili  0.3  /  DBili  x   /  AST  19  /  ALT  20  /  AlkPhos  74  10-08    PT/INR - ( 08 Oct 2020 22:21 )   PT: 11.4 sec;   INR: 0.97 ratio       PTT - ( 08 Oct 2020 22:21 )  PTT:29.6 sec    Urinalysis Basic - ( 08 Oct 2020 17:10 )  Color: Yellow / Appearance: Clear / S.015 / pH: x  Gluc: x / Ketone: Negative  / Bili: Negative / Urobili: Negative mg/dL   Blood: x / Protein: 15 mg/dL / Nitrite: Negative   Leuk Esterase: Negative / RBC: Negative /HPF / WBC Negative   Sq Epi: x / Non Sq Epi: Negative / Bacteria: Negative    POCT Blood Glucose.: 115 mg/dL (08 Oct 2020 22:17)    RADIOLOGY & ADDITIONAL TESTS:  EXAM:  XR SHOULDER COMP MIN 2V LT                        PROCEDURE DATE:  10/09/2020    INTERPRETATION:  EXAM:XR SHOULDER COMPLETE 2 VIEWS LEFT.     CLINICAL INDICATION: fall fracture/axillary .     TECHNIQUE: Axillary view of the left shoulder.     PRIOR EXAM: 10/08/2020.     FINDINGS:     No evidence of a dislocation. Comminuted fracture of the humeral head and surgical neck along with possibility of impaction.    IMPRESSION:    As above.    EXAM:  XR HAND MIN 3 VIEWS LT                        EXAM:  XR WRIST COMP MIN 3 VIEWS LT                        EXAM:  XR SHOULDER COMP MIN 2V LT                        EXAM:  XR HUMERUS MIN 2 VIEWS LT                        EXAM:  XR FOREARM 2 VIEWS LT                          PROCEDURE DATE:  10/08/2020    INTERPRETATION:  Left shoulder, humerus, forearm, wrist, and hand. Patient fell with local trauma.    Left shoulder. 3 views.    There is a fracture of the greater tuberosity. There is fairly advanced degeneration at the glenohumeral joint.  Dystrophic calcification is seen peripheral to the shoulder.    Left humerus. 2 views.  Bones are demineralized. Lower humerus appears intact.    Left forearm. 2 views.  Proximal forearm bones are unremarkable.    Left wrist. 3 views.  There is a fracture horizontal in nature of the distal radius with ventral angulation of the distal fragment.  There is moderate to advanced degeneration around the trapezium.    Left hand. 3 views.  There is scattered moderate IP degeneration.  Distal bones are intact.    IMPRESSION: Fracture of the greater tuberosity of the humerus. Distal radial fracture as above. Hand degeneration.    PHYSICAL EXAM:  GENERAL: NAD, well-groomed, well-developed  HEAD:  Atraumatic, Normocephalic  EYES: EOMI, PERRLA, conjunctiva and sclera clear  HEENT: Moist mucous membranes  NECK: Supple, No JVD  NERVOUS SYSTEM:  Alert & Oriented X3, Motor Strength 5/5 B/L upper and lower extremities; DTRs 2+ intact and symmetric  CHEST/LUNG: Clear to auscultation bilaterally; No rales, rhonchi, wheezing, or rubs  HEART: Regular rate and rhythm; No murmurs, rubs, or gallops  ABDOMEN: Soft, Nontender, Nondistended; Bowel sounds present  GENITOURINARY- DTV  EXTREMITIES:  significant hematomas LUE  MUSCULOSKELTAL- LUE in splint  SKIN-no rash, no lesion  CNS- alert, oriented X3, non focal     MEDICATIONS  (STANDING):  acetaminophen   Tablet .. 975 milliGRAM(s) Oral every 8 hours  aspirin  chewable 81 milliGRAM(s) Oral daily  bethanechol 25 milliGRAM(s) Oral every 8 hours  buPROPion XL . 300 milliGRAM(s) Oral daily  lactated ringers. 1000 milliLiter(s) (75 mL/Hr) IV Continuous <Continuous>  polyethylene glycol 3350 17 Gram(s) Oral two times a day  senna 2 Tablet(s) Oral at bedtime  thyroid 90 milliGRAM(s) Oral daily    MEDICATIONS  (PRN):  acetaminophen   Tablet .. 650 milliGRAM(s) Oral every 6 hours PRN Mild Pain (1 - 3)  bisacodyl 5 milliGRAM(s) Oral every 12 hours PRN Constipation  ondansetron Injectable 4 milliGRAM(s) IV Push every 6 hours PRN Nausea and/or Vomiting  traMADol 25 milliGRAM(s) Oral every 4 hours PRN Moderate Pain (4 - 6)  traMADol 50 milliGRAM(s) Oral every 4 hours PRN Severe Pain (7 - 10)    Assessment/Plan  #LUE proximal humeral fracture & distal radial fracture 2 to mechanical fall  #Anemia acute blood loss from fractures  Ortho eval appreciated  Reduced and placed in splint  NWB to RUE, keep in splint  Ice  Monitor HH  Pain meds adjusted- will DC Dilaudid  Bowel regimen  Incentive spirometry  PT eval- for DAYSI likely Monday    #Hyperkalemia- resolved  S/p Kayexalate 15mg  Hold Lisinopril / Aldactone     #ANDREZ- resolved  Diuretics on hold    #Mild hypotension likely 2 to narcotics  DC Dilaudid  IVF bolus the @75cc/hr as limited PO intake    #Urinary retention s/p straight cath x2  Will bladder scan and if >350cc will place Robert  Start bethanechol TID    #Hypothyroidism  [place] Thyroid 90mg    #DVT PPX  Lovenox SQ. AC consult    #Advanced Directives  DNR/DNI    #COVID neg 10/9    #Dispo- continue pain management, likely DAYSI Monday. D/w pt and ortho team

## 2020-10-10 NOTE — PHYSICAL THERAPY INITIAL EVALUATION ADULT - ACTIVE RANGE OF MOTION EXAMINATION, REHAB EVAL
Right UE Active ROM was WFL (within functional limits)/LUE NA except able to wiggle fingers, B hip/knee flex ltd in bed/bilateral  lower extremity Active ROM was WFL (within functional limits)

## 2020-10-10 NOTE — PHYSICAL THERAPY INITIAL EVALUATION ADULT - DIAGNOSIS, PT EVAL
L proximal humerus & distal radius fracture
L proximal humerus & distal radius fracture, S/P closed reduction

## 2020-10-10 NOTE — PHYSICAL THERAPY INITIAL EVALUATION ADULT - ADDITIONAL COMMENTS
Lives alone. Uses quad cane. Has aide tues and friday for 4 hours to help with shower and food shopping. 3 HANSEL with 1 HR. Has shower chair and commode.
 4 hrs BIW

## 2020-10-11 LAB
ANION GAP SERPL CALC-SCNC: 6 MMOL/L — SIGNIFICANT CHANGE UP (ref 5–17)
BUN SERPL-MCNC: 16 MG/DL — SIGNIFICANT CHANGE UP (ref 7–23)
CALCIUM SERPL-MCNC: 8.9 MG/DL — SIGNIFICANT CHANGE UP (ref 8.5–10.1)
CHLORIDE SERPL-SCNC: 106 MMOL/L — SIGNIFICANT CHANGE UP (ref 96–108)
CO2 SERPL-SCNC: 26 MMOL/L — SIGNIFICANT CHANGE UP (ref 22–31)
CREAT SERPL-MCNC: 0.84 MG/DL — SIGNIFICANT CHANGE UP (ref 0.5–1.3)
GLUCOSE SERPL-MCNC: 92 MG/DL — SIGNIFICANT CHANGE UP (ref 70–99)
HCT VFR BLD CALC: 30.8 % — LOW (ref 34.5–45)
HGB BLD-MCNC: 9.6 G/DL — LOW (ref 11.5–15.5)
MCHC RBC-ENTMCNC: 28.8 PG — SIGNIFICANT CHANGE UP (ref 27–34)
MCHC RBC-ENTMCNC: 31.2 GM/DL — LOW (ref 32–36)
MCV RBC AUTO: 92.5 FL — SIGNIFICANT CHANGE UP (ref 80–100)
PLATELET # BLD AUTO: 192 K/UL — SIGNIFICANT CHANGE UP (ref 150–400)
POTASSIUM SERPL-MCNC: 4.4 MMOL/L — SIGNIFICANT CHANGE UP (ref 3.5–5.3)
POTASSIUM SERPL-SCNC: 4.4 MMOL/L — SIGNIFICANT CHANGE UP (ref 3.5–5.3)
RBC # BLD: 3.33 M/UL — LOW (ref 3.8–5.2)
RBC # FLD: 12.2 % — SIGNIFICANT CHANGE UP (ref 10.3–14.5)
SODIUM SERPL-SCNC: 138 MMOL/L — SIGNIFICANT CHANGE UP (ref 135–145)
WBC # BLD: 5.93 K/UL — SIGNIFICANT CHANGE UP (ref 3.8–10.5)
WBC # FLD AUTO: 5.93 K/UL — SIGNIFICANT CHANGE UP (ref 3.8–10.5)

## 2020-10-11 PROCEDURE — 99233 SBSQ HOSP IP/OBS HIGH 50: CPT

## 2020-10-11 RX ORDER — LIDOCAINE 4 G/100G
1 CREAM TOPICAL DAILY
Refills: 0 | Status: DISCONTINUED | OUTPATIENT
Start: 2020-10-11 | End: 2020-10-14

## 2020-10-11 RX ORDER — HYDROMORPHONE HYDROCHLORIDE 2 MG/ML
0.5 INJECTION INTRAMUSCULAR; INTRAVENOUS; SUBCUTANEOUS EVERY 4 HOURS
Refills: 0 | Status: DISCONTINUED | OUTPATIENT
Start: 2020-10-11 | End: 2020-10-14

## 2020-10-11 RX ORDER — HYDROMORPHONE HYDROCHLORIDE 2 MG/ML
4 INJECTION INTRAMUSCULAR; INTRAVENOUS; SUBCUTANEOUS EVERY 4 HOURS
Refills: 0 | Status: DISCONTINUED | OUTPATIENT
Start: 2020-10-11 | End: 2020-10-14

## 2020-10-11 RX ORDER — HYDROMORPHONE HYDROCHLORIDE 2 MG/ML
0.5 INJECTION INTRAMUSCULAR; INTRAVENOUS; SUBCUTANEOUS ONCE
Refills: 0 | Status: DISCONTINUED | OUTPATIENT
Start: 2020-10-11 | End: 2020-10-11

## 2020-10-11 RX ORDER — ENOXAPARIN SODIUM 100 MG/ML
40 INJECTION SUBCUTANEOUS DAILY
Refills: 0 | Status: DISCONTINUED | OUTPATIENT
Start: 2020-10-11 | End: 2020-10-14

## 2020-10-11 RX ORDER — HYDROMORPHONE HYDROCHLORIDE 2 MG/ML
4 INJECTION INTRAMUSCULAR; INTRAVENOUS; SUBCUTANEOUS EVERY 4 HOURS
Refills: 0 | Status: DISCONTINUED | OUTPATIENT
Start: 2020-10-11 | End: 2020-10-11

## 2020-10-11 RX ORDER — HYDROMORPHONE HYDROCHLORIDE 2 MG/ML
2 INJECTION INTRAMUSCULAR; INTRAVENOUS; SUBCUTANEOUS EVERY 4 HOURS
Refills: 0 | Status: DISCONTINUED | OUTPATIENT
Start: 2020-10-11 | End: 2020-10-11

## 2020-10-11 RX ORDER — HYDROMORPHONE HYDROCHLORIDE 2 MG/ML
6 INJECTION INTRAMUSCULAR; INTRAVENOUS; SUBCUTANEOUS EVERY 4 HOURS
Refills: 0 | Status: DISCONTINUED | OUTPATIENT
Start: 2020-10-11 | End: 2020-10-14

## 2020-10-11 RX ADMIN — TRAMADOL HYDROCHLORIDE 50 MILLIGRAM(S): 50 TABLET ORAL at 00:40

## 2020-10-11 RX ADMIN — Medication 975 MILLIGRAM(S): at 04:36

## 2020-10-11 RX ADMIN — Medication 25 MILLIGRAM(S): at 21:09

## 2020-10-11 RX ADMIN — Medication 975 MILLIGRAM(S): at 02:21

## 2020-10-11 RX ADMIN — Medication 25 MILLIGRAM(S): at 13:08

## 2020-10-11 RX ADMIN — HYDROMORPHONE HYDROCHLORIDE 2 MILLIGRAM(S): 2 INJECTION INTRAMUSCULAR; INTRAVENOUS; SUBCUTANEOUS at 11:43

## 2020-10-11 RX ADMIN — SENNA PLUS 2 TABLET(S): 8.6 TABLET ORAL at 21:09

## 2020-10-11 RX ADMIN — TRAMADOL HYDROCHLORIDE 50 MILLIGRAM(S): 50 TABLET ORAL at 05:40

## 2020-10-11 RX ADMIN — SODIUM CHLORIDE 75 MILLILITER(S): 9 INJECTION, SOLUTION INTRAVENOUS at 21:14

## 2020-10-11 RX ADMIN — POLYETHYLENE GLYCOL 3350 17 GRAM(S): 17 POWDER, FOR SOLUTION ORAL at 21:09

## 2020-10-11 RX ADMIN — HYDROMORPHONE HYDROCHLORIDE 0.5 MILLIGRAM(S): 2 INJECTION INTRAMUSCULAR; INTRAVENOUS; SUBCUTANEOUS at 13:46

## 2020-10-11 RX ADMIN — HYDROMORPHONE HYDROCHLORIDE 0.5 MILLIGRAM(S): 2 INJECTION INTRAMUSCULAR; INTRAVENOUS; SUBCUTANEOUS at 06:44

## 2020-10-11 RX ADMIN — HYDROMORPHONE HYDROCHLORIDE 0.5 MILLIGRAM(S): 2 INJECTION INTRAMUSCULAR; INTRAVENOUS; SUBCUTANEOUS at 13:31

## 2020-10-11 RX ADMIN — HYDROMORPHONE HYDROCHLORIDE 2 MILLIGRAM(S): 2 INJECTION INTRAMUSCULAR; INTRAVENOUS; SUBCUTANEOUS at 12:40

## 2020-10-11 RX ADMIN — Medication 81 MILLIGRAM(S): at 09:58

## 2020-10-11 RX ADMIN — Medication 975 MILLIGRAM(S): at 10:59

## 2020-10-11 RX ADMIN — BUPROPION HYDROCHLORIDE 300 MILLIGRAM(S): 150 TABLET, EXTENDED RELEASE ORAL at 09:59

## 2020-10-11 RX ADMIN — TRAMADOL HYDROCHLORIDE 50 MILLIGRAM(S): 50 TABLET ORAL at 04:42

## 2020-10-11 RX ADMIN — Medication 5 MILLIGRAM(S): at 09:58

## 2020-10-11 RX ADMIN — LIDOCAINE 1 PATCH: 4 CREAM TOPICAL at 23:40

## 2020-10-11 RX ADMIN — Medication 975 MILLIGRAM(S): at 17:18

## 2020-10-11 RX ADMIN — ONDANSETRON 4 MILLIGRAM(S): 8 TABLET, FILM COATED ORAL at 15:01

## 2020-10-11 RX ADMIN — LIDOCAINE 1 PATCH: 4 CREAM TOPICAL at 20:20

## 2020-10-11 RX ADMIN — LIDOCAINE 1 PATCH: 4 CREAM TOPICAL at 11:44

## 2020-10-11 RX ADMIN — Medication 25 MILLIGRAM(S): at 06:16

## 2020-10-11 RX ADMIN — SODIUM CHLORIDE 75 MILLILITER(S): 9 INJECTION, SOLUTION INTRAVENOUS at 06:16

## 2020-10-11 RX ADMIN — HYDROMORPHONE HYDROCHLORIDE 0.5 MILLIGRAM(S): 2 INJECTION INTRAMUSCULAR; INTRAVENOUS; SUBCUTANEOUS at 06:39

## 2020-10-11 RX ADMIN — TRAMADOL HYDROCHLORIDE 50 MILLIGRAM(S): 50 TABLET ORAL at 01:20

## 2020-10-11 RX ADMIN — Medication 975 MILLIGRAM(S): at 09:59

## 2020-10-11 RX ADMIN — HYDROMORPHONE HYDROCHLORIDE 4 MILLIGRAM(S): 2 INJECTION INTRAMUSCULAR; INTRAVENOUS; SUBCUTANEOUS at 23:50

## 2020-10-11 RX ADMIN — Medication 975 MILLIGRAM(S): at 18:18

## 2020-10-11 RX ADMIN — POLYETHYLENE GLYCOL 3350 17 GRAM(S): 17 POWDER, FOR SOLUTION ORAL at 09:58

## 2020-10-11 RX ADMIN — Medication 90 MILLIGRAM(S): at 06:16

## 2020-10-11 NOTE — PROGRESS NOTE ADULT - SUBJECTIVE AND OBJECTIVE BOX
CC- s/p mechanical fall    HPI:  87 yo F w PMH of HTN, hypothyroidism & Right knee OA brought into the ED by EMS due to fall. Pt states she was at home walking to turn on the light in a bedroom when she tripped and fell onto her left shoulder. Fall was unwitnessed, states she remembers fall, admits to hitting head but denies LOC, chest pain, dizziness or weakness. Fell & pushed the life alert, EMS was called and she was brought to the ED. Lives alone and ambulates w a cane. Elicits left shoulder arm pain, denies chest pain, palpitations, SOB, dizziness or lethargy.   In the ED pt was 2LNS, Tylenol & Dilaudid. Ortho was consulted and assess pt in ED.   Last admission was in Sept 14, 2019 for upper back pain, was found to have T6 fracture from fall back in August 2019.  (09 Oct 2020 03:45)    10/10- was c/p pain earlier and was medicated by dilaudid. Subsequently, became slightly hypotensive.  10/11 pain control still an issue, requires injectable narcotics. Robert placed for urinary retention    Review of system- All 10 systems reviewed and is as per HPI otherwise negative.     Vital Signs Last 24 Hrs  T(C): 36.4 (11 Oct 2020 07:18), Max: 36.4 (10 Oct 2020 15:34)  T(F): 97.5 (11 Oct 2020 07:18), Max: 97.6 (10 Oct 2020 15:34)  HR: 91 (11 Oct 2020 07:18) (85 - 96)  BP: 117/46 (11 Oct 2020 07:18) (82/55 - 118/50)  BP(mean): --  RR: 18 (11 Oct 2020 07:18) (16 - 18)  SpO2: 99% (11 Oct 2020 07:18) (89% - 100%)    LABS:                        9.6    5.93  )-----------( 192      ( 11 Oct 2020 08:31 )             30.8     11 Oct 2020 08:31    138    |  106    |  16     ----------------------------<  92     4.4     |  26     |  0.84     Ca    8.9        11 Oct 2020 08:31  Phos  3.2       10 Oct 2020 08:23  Mg     1.9       10 Oct 2020 08:23    RADIOLOGY & ADDITIONAL TESTS:  EXAM:  XR SHOULDER COMP MIN 2V LT                        PROCEDURE DATE:  10/09/2020    INTERPRETATION:  EXAM:XR SHOULDER COMPLETE 2 VIEWS LEFT.     CLINICAL INDICATION: fall fracture/axillary .     TECHNIQUE: Axillary view of the left shoulder.     PRIOR EXAM: 10/08/2020.     FINDINGS:     No evidence of a dislocation. Comminuted fracture of the humeral head and surgical neck along with possibility of impaction.    IMPRESSION:    As above.    EXAM:  XR HAND MIN 3 VIEWS LT                        EXAM:  XR WRIST COMP MIN 3 VIEWS LT                        EXAM:  XR SHOULDER COMP MIN 2V LT                        EXAM:  XR HUMERUS MIN 2 VIEWS LT                        EXAM:  XR FOREARM 2 VIEWS LT                          PROCEDURE DATE:  10/08/2020    INTERPRETATION:  Left shoulder, humerus, forearm, wrist, and hand. Patient fell with local trauma.    Left shoulder. 3 views.    There is a fracture of the greater tuberosity. There is fairly advanced degeneration at the glenohumeral joint.  Dystrophic calcification is seen peripheral to the shoulder.    Left humerus. 2 views.  Bones are demineralized. Lower humerus appears intact.    Left forearm. 2 views.  Proximal forearm bones are unremarkable.    Left wrist. 3 views.  There is a fracture horizontal in nature of the distal radius with ventral angulation of the distal fragment.  There is moderate to advanced degeneration around the trapezium.    Left hand. 3 views.  There is scattered moderate IP degeneration.  Distal bones are intact.    IMPRESSION: Fracture of the greater tuberosity of the humerus. Distal radial fracture as above. Hand degeneration.    PHYSICAL EXAM:  GENERAL: NAD, well-groomed, well-developed  HEAD:  Atraumatic, Normocephalic  EYES: EOMI, PERRLA, conjunctiva and sclera clear  HEENT: Moist mucous membranes  NECK: Supple, No JVD  NERVOUS SYSTEM:  Alert & Oriented X3, Motor Strength 5/5 B/L upper and lower extremities; DTRs 2+ intact and symmetric  CHEST/LUNG: Clear to auscultation bilaterally; No rales, rhonchi, wheezing, or rubs  HEART: Regular rate and rhythm; No murmurs, rubs, or gallops  ABDOMEN: Soft, Nontender, Nondistended; Bowel sounds present  GENITOURINARY- Robert  EXTREMITIES:  significant hematomas LUE  MUSCULOSKELTAL- LUE in splint  SKIN-no rash, no lesion  CNS- alert, oriented X3, non focal     MEDICATIONS  (STANDING):  acetaminophen   Tablet .. 975 milliGRAM(s) Oral every 8 hours  aspirin  chewable 81 milliGRAM(s) Oral daily  bethanechol 25 milliGRAM(s) Oral every 8 hours  buPROPion XL . 300 milliGRAM(s) Oral daily  lactated ringers. 1000 milliLiter(s) (75 mL/Hr) IV Continuous <Continuous>  lidocaine   Patch 1 Patch Transdermal daily  polyethylene glycol 3350 17 Gram(s) Oral two times a day  senna 2 Tablet(s) Oral at bedtime  thyroid 90 milliGRAM(s) Oral daily    MEDICATIONS  (PRN):  acetaminophen   Tablet .. 650 milliGRAM(s) Oral every 6 hours PRN Mild Pain (1 - 3)  bisacodyl 5 milliGRAM(s) Oral every 12 hours PRN Constipation  HYDROmorphone   Tablet 4 milliGRAM(s) Oral every 4 hours PRN Moderate Pain (4 - 6)  HYDROmorphone   Tablet 6 milliGRAM(s) Oral every 4 hours PRN Severe Pain (7 - 10)  HYDROmorphone  Injectable 0.5 milliGRAM(s) IV Push every 4 hours PRN breakthrough pain  ondansetron Injectable 4 milliGRAM(s) IV Push every 6 hours PRN Nausea and/or Vomiting    Assessment/Plan  #LUE proximal humeral fracture & distal radial fracture 2 to mechanical fall  #Anemia acute blood loss from fractures  Ortho eval appreciated  Reduced and placed in splint  NWB to RUE, keep in splint  Ice  Monitor HH  Pain management is still an issue- requires injectable narcotics for pain control  Was given trial of PO Dilaudid 2mg earlier today- was not sufficient to control her pain  Will switch to Dilaudid 4mg for moderate/ 6mg for severe pain  Bowel regimen  Incentive spirometry  PT eval- for DAYSI once pain is controlled on oral meds    #Hyperkalemia- resolved  S/p Kayexalate 15mg  Hold Lisinopril / Aldactone     #ANDREZ- resolved  Diuretics on hold    #Mild hypotension likely 2 to narcotics  S/p IVF bolus. Better now    #Urinary retention s/p straight cath x2  Robert placed yesterday  Cont bethanechol  Voiding trial once more mobile    #Hypothyroidism  [place] Thyroid 90mg    #DVT PPX  Lovenox SQ. AC consult    #Advanced Directives  DNR/DNI    #COVID neg 10/9    #Dispo- continue pain management, likely DAYSI once pain is controlled.  D/w pt and ortho team

## 2020-10-12 DIAGNOSIS — F32.4 MAJOR DEPRESSIVE DISORDER, SINGLE EPISODE, IN PARTIAL REMISSION: ICD-10-CM

## 2020-10-12 PROCEDURE — 99223 1ST HOSP IP/OBS HIGH 75: CPT

## 2020-10-12 PROCEDURE — 90792 PSYCH DIAG EVAL W/MED SRVCS: CPT

## 2020-10-12 PROCEDURE — 99232 SBSQ HOSP IP/OBS MODERATE 35: CPT

## 2020-10-12 RX ORDER — MULTIVIT WITH MIN/MFOLATE/K2 340-15/3 G
1 POWDER (GRAM) ORAL ONCE
Refills: 0 | Status: COMPLETED | OUTPATIENT
Start: 2020-10-12 | End: 2020-10-12

## 2020-10-12 RX ADMIN — Medication 975 MILLIGRAM(S): at 18:13

## 2020-10-12 RX ADMIN — LIDOCAINE 1 PATCH: 4 CREAM TOPICAL at 21:00

## 2020-10-12 RX ADMIN — HYDROMORPHONE HYDROCHLORIDE 6 MILLIGRAM(S): 2 INJECTION INTRAMUSCULAR; INTRAVENOUS; SUBCUTANEOUS at 18:19

## 2020-10-12 RX ADMIN — HYDROMORPHONE HYDROCHLORIDE 0.5 MILLIGRAM(S): 2 INJECTION INTRAMUSCULAR; INTRAVENOUS; SUBCUTANEOUS at 05:03

## 2020-10-12 RX ADMIN — Medication 975 MILLIGRAM(S): at 03:17

## 2020-10-12 RX ADMIN — ENOXAPARIN SODIUM 40 MILLIGRAM(S): 100 INJECTION SUBCUTANEOUS at 09:57

## 2020-10-12 RX ADMIN — HYDROMORPHONE HYDROCHLORIDE 0.5 MILLIGRAM(S): 2 INJECTION INTRAMUSCULAR; INTRAVENOUS; SUBCUTANEOUS at 05:20

## 2020-10-12 RX ADMIN — POLYETHYLENE GLYCOL 3350 17 GRAM(S): 17 POWDER, FOR SOLUTION ORAL at 13:31

## 2020-10-12 RX ADMIN — Medication 975 MILLIGRAM(S): at 10:53

## 2020-10-12 RX ADMIN — Medication 25 MILLIGRAM(S): at 13:35

## 2020-10-12 RX ADMIN — Medication 25 MILLIGRAM(S): at 06:19

## 2020-10-12 RX ADMIN — HYDROMORPHONE HYDROCHLORIDE 6 MILLIGRAM(S): 2 INJECTION INTRAMUSCULAR; INTRAVENOUS; SUBCUTANEOUS at 16:57

## 2020-10-12 RX ADMIN — Medication 25 MILLIGRAM(S): at 22:44

## 2020-10-12 RX ADMIN — HYDROMORPHONE HYDROCHLORIDE 4 MILLIGRAM(S): 2 INJECTION INTRAMUSCULAR; INTRAVENOUS; SUBCUTANEOUS at 00:30

## 2020-10-12 RX ADMIN — SODIUM CHLORIDE 75 MILLILITER(S): 9 INJECTION, SOLUTION INTRAVENOUS at 22:44

## 2020-10-12 RX ADMIN — LIDOCAINE 1 PATCH: 4 CREAM TOPICAL at 09:57

## 2020-10-12 RX ADMIN — HYDROMORPHONE HYDROCHLORIDE 6 MILLIGRAM(S): 2 INJECTION INTRAMUSCULAR; INTRAVENOUS; SUBCUTANEOUS at 09:51

## 2020-10-12 RX ADMIN — SODIUM CHLORIDE 75 MILLILITER(S): 9 INJECTION, SOLUTION INTRAVENOUS at 10:06

## 2020-10-12 RX ADMIN — Medication 975 MILLIGRAM(S): at 04:00

## 2020-10-12 RX ADMIN — Medication 975 MILLIGRAM(S): at 09:57

## 2020-10-12 RX ADMIN — Medication 1 BOTTLE: at 15:12

## 2020-10-12 RX ADMIN — BUPROPION HYDROCHLORIDE 300 MILLIGRAM(S): 150 TABLET, EXTENDED RELEASE ORAL at 09:57

## 2020-10-12 RX ADMIN — HYDROMORPHONE HYDROCHLORIDE 6 MILLIGRAM(S): 2 INJECTION INTRAMUSCULAR; INTRAVENOUS; SUBCUTANEOUS at 10:53

## 2020-10-12 RX ADMIN — Medication 90 MILLIGRAM(S): at 06:19

## 2020-10-12 RX ADMIN — Medication 81 MILLIGRAM(S): at 09:57

## 2020-10-12 NOTE — CONSULT NOTE ADULT - SUBJECTIVE AND OBJECTIVE BOX
HPI:  89 yo F w PMH of HTN, hypothyroidism & Right knee OA brought into the ED by EMS due to fall. Pt states she was at home walking to turn on the light in a bedroom when she tripped and fell onto her left shoulder. Fall was unwitnessed, states she remembers fall, admits to hitting head but denies LOC, chest pain, dizziness or weakness. Fell & pushed the life alert, EMS was called and she was brought to the ED. Lives alone and ambulates w a cane. Elicits left shoulder arm pain, denies chest pain, palpitations, SOB, dizziness or lethargy.   In the ED pt was 2LNS, Tylenol & Dilaudid. Ortho was consulted and assess pt in ED.   Last admission was in 2019 for upper back pain, was found to have T6 fracture from fall back in 2019.  (09 Oct 2020 03:45)      Patient is a 88y old  Female who presents with a chief complaint of Fall, sustainted L proximal humerus & distal radius fracture s/p closed reduction of the left humerus fracture. Consulted by     for VTE prophylaxis, risk stratification, and anticoagulation management.        PAST MEDICAL & SURGICAL HISTORY:  Hypertension    Osteoarthritis, localized  Right knee    Hypothyroid    History of cholecystectomy    History of appendectomy    Interval History  10/12/20:Patient seen at bedside, denies any pain discussed anticoagulation with Lovenox while in the hospital as she is not amulated yet and NWB on the left Upper extremity, Patient denies any H/o VTE events , stoke or Cancer          CrCl:55.9  BMI:29.2    Caprini VTE Risk Score:CAPRINI SCORE  AGE RELATED RISK FACTORS                                                       MOBILITY RELATED FACTORS  [ ] Age 41-60 years                                            (1 Point)                  [x ] Bed rest /restricted mobility                             (1 Point)  [ ] Age: 61-74 years                                           (2 Points)                [x ] Plaster cast                                                   (2 Points)  [x ] Age= 75 years                                              (3 Points)                 [ ] Bed bound for more than 72 hours                   (2 Points)    DISEASE RELATED RISK FACTORS                                               GENDER SPECIFIC FACTORS  [ ] Edema in the lower extremities                       (1 Point)           [ ] Pregnancy                                                            (1 Point)  [ ] Varicose veins                                               (1 Point)                  [ ] Post-partum < 6 weeks                                      (1 Point)             [x ] BMI > 25 Kg/m2                                            (1 Point)                  [ ] Hormonal therapy or oral contraception       (1 Point)                 [ ] Sepsis (in the previous month)                        (1 Point)             [ ] History of pregnancy complications                (1Point)  [ ] Pneumonia or serious lung disease                                             [ ] Unexplained or recurrent  (=/>3), premature                                 (In the previous month)                               (1 Point)                birth with toxemia or growth-restricted infant (1 Point)  [ ] Abnormal pulmonary function test            (1 Point)                                   SURGERY RELATED RISK FACTORS  [ ] Acute myocardial infarction                       (1 Point)                  [ ]  Section                                         (1 Point)  [ ] Congestive heart failure (in the previous month) (1 Point)   [ ] Minor surgery   lasting <45 minutes       (1 Point)   [ ] Inflammatory bowel disease                             (1 Point)          [ ] Arthroscopic surgery                                  (2 Points)  [ ] Central venous access                                    (2 Points)            [ ] General surgery lasting >45 minutes      (2 Points)       [ ] Stroke (in the previous month)                  (5 Points)            [ ] Elective major lower extremity arthroplasty (5 Points)                                   [  ] Malignancy (present or past include skin melanoma                                          but exclude  basal skin cell)    (2 points)                                      TRAUMA RELATED RISK FACTORS                HEMATOLOGY RELATED FACTORS                                  [ ] Fracture of the hip, pelvis, or leg                       (5 Points)  [ ] Prior episodes of VTE                                     (3 Points)          [ ] Acute spinal cord injury (in the previous month)  (5 Points)  [ ] Positive family history for VTE                         (3 Points)       [ ] Paralysis (less than 1 month)                          (5 Points)  [ ] Prothrombin 52426 A                                      (3 Points)         [ ] Multiple Trauma (within 1month)                 (5Points)                                                                                                                                                                [ ] Factor V Leiden                                          (3 Points)                                OTHER RISK FACTORS                          [ ] Lupus anticoagulants                                     (3 Points)                       [ ] BMI > 40                          (1 Point)                                                         [ ] Anticardiolipin antibodies                                (3 Points)                   [ ] Smoking                              (1Point)                                                [ ] High homocysteine in the blood                      (3 Points)                [  ] Diabetes requiring insulin (1point)                         [ ] Other congenital or acquired thrombophilia       (3 Points)          [  ] Chemotherapy                   (1 Point)  [ ] Heparin induced thrombocytopenia                  (3 Points)             [  ] Blood Transfusion                (1 point)                                                                                                             Total Score [     7     ]                                                                                                                                                                                                                                                                                                                                                                                                                                         IMPROVE Bleeding Risk Score: 3.5    Falls Risk:   High ( x )  Mod (  )  Low (  )      FAMILY HISTORY:    Denies any personal or familial history of clotting or bleeding disorders.    Allergies    oxycodone (Hives; Flushing)  sulfa drugs (Other)    Intolerances        REVIEW OF SYSTEMS    (  )Fever	     (  )Constipation	(  )SOB				(  )Headache	(  )Dysuria  (  )Chills	     (  )Melena	(  )Dyspnea present on exertion	                    (  )Dizziness                    (  )Polyuria  (  )Nausea	     (  )Hematochezia	(  )Cough			                    (  )Syncope   	(  )Hematuria  (  )Vomiting    (  )Chest Pain	(  )Wheezing			(  )Weakness  (  )Diarrhea     (  )Palpitations	(  )Anorexia			( x )joint pain    All  other review of systems negative: Yes    Vital Signs Last 24 Hrs  T(C): 36.5 (12 Oct 2020 07:53), Max: 36.8 (11 Oct 2020 16:17)  T(F): 97.7 (12 Oct 2020 07:53), Max: 98.3 (11 Oct 2020 16:17)  HR: 98 (12 Oct 2020 09:45) (66 - 103)  BP: 115/53 (12 Oct 2020 09:45) (106/52 - 129/61)  BP(mean): --  RR: 18 (12 Oct 2020 07:53) (18 - 18)  SpO2: 92% (12 Oct 2020 07:53) (92% - 96%)    PHYSICAL EXAM:    Constitutional: Appears Well    Neurological: A& O x 3    Skin: Warm    Respiratory and Thorax: normal effort; Breath sounds: normal; No rales/wheezing/rhonchi  	  Cardiovascular: S1, S2, regular, NMBR	    Gastrointestinal: BS + x 4Q, nontender	    Genitourinary:  Bladder nondistended, nontender    Musculoskeletal:   General Right:   no muscle/joint tenderness,   normal tone, no joint swelling,   ROM: full	    General Left:   + muscle/joint tenderness,   normal tone, no joint swelling,   ROM: limited        Shoulder: Lt: Drsing CDI; Sling noted; Cap refill good;  Sensation intact    Lower extrems:   Right: no calf tenderness              negative cassie's sign               + pedal pulses    Left:   no calf tenderness              negative cassie's sign               + pedal pulses                          9.6    5.93  )-----------( 192      ( 11 Oct 2020 08:31 )             30.8       10-11    138  |  106  |  16  ----------------------------<  92  4.4   |  26  |  0.84    Ca    8.9      11 Oct 2020 08:31        				    MEDICATIONS  (STANDING):  acetaminophen   Tablet .. 975 milliGRAM(s) Oral every 8 hours  aspirin  chewable 81 milliGRAM(s) Oral daily  bethanechol 25 milliGRAM(s) Oral every 8 hours  buPROPion XL . 300 milliGRAM(s) Oral daily  enoxaparin Injectable 40 milliGRAM(s) SubCutaneous daily  lactated ringers. 1000 milliLiter(s) IV Continuous <Continuous>  lidocaine   Patch 1 Patch Transdermal daily  magnesium citrate Oral Solution 1 Bottle Oral once  polyethylene glycol 3350 17 Gram(s) Oral two times a day  senna 2 Tablet(s) Oral at bedtime  thyroid 90 milliGRAM(s) Oral daily          DVT Prophylaxis:  LMWH                   ( x )  Heparin SQ           (  )  Coumadin             (  )  Xarelto                  (  )  Eliquis                   (  )  Venodynes           (x  )  Ambulation          (x  )  UFH                       (  )  Contraindicated  (  )  EC ASPIRIN       (  )

## 2020-10-12 NOTE — BEHAVIORAL HEALTH ASSESSMENT NOTE - RISK ASSESSMENT
Low Acute Suicide Risk LOW acute risk>? 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 Acute pain si treated.   LOW long term risk: considering no past hx until her son  7 months ago, plans about her future, has cat to take care of, family is supportive.   Protective factors: plans for the future and supportive family.

## 2020-10-12 NOTE — CONSULT NOTE ADULT - ASSESSMENT
Patient is a 88y old  Female who presents with a chief complaint of Fall, sustained L proximal humerus & distal radius fracture s/p closed reduction of the left humerus fracture. Consulted by     for VTE prophylaxis, risk stratification, and anticoagulation management. patient moderate risk for VTE due to age, restricted mobility, and NWB of the upper extremity, low bleeding risk.    Plan  :Continue Lovenox 40mg sq daily for 2 weeks   :daily cbc/bmp  :LE Venodynes  : increase mobility as tolerated  :Thanks for consult will f/u  : Dispo:Rehab

## 2020-10-12 NOTE — BEHAVIORAL HEALTH ASSESSMENT NOTE - NSBHCHARTREVIEWLAB_PSY_A_CORE FT
9.6    5.93  )-----------( 192      ( 11 Oct 2020 08:31 )             30.8   10-11    138  |  106  |  16  ----------------------------<  92  4.4   |  26  |  0.84    Ca    8.9      11 Oct 2020 08:31

## 2020-10-12 NOTE — BEHAVIORAL HEALTH ASSESSMENT NOTE - NSBHCHARTREVIEWVS_PSY_A_CORE FT
Vital Signs Last 24 Hrs  T(C): 36.4 (12 Oct 2020 16:01), Max: 36.5 (12 Oct 2020 07:53)  T(F): 97.6 (12 Oct 2020 16:01), Max: 97.7 (12 Oct 2020 07:53)  HR: 87 (12 Oct 2020 16:01) (87 - 103)  BP: 118/58 (12 Oct 2020 16:01) (113/64 - 129/61)  BP(mean): --  RR: 17 (12 Oct 2020 16:01) (17 - 18)  SpO2: 100% (12 Oct 2020 16:01) (92% - 100%)

## 2020-10-12 NOTE — PROGRESS NOTE ADULT - SUBJECTIVE AND OBJECTIVE BOX
CC- s/p mechanical fall    HPI:  89 yo F w PMH of HTN, hypothyroidism & Right knee OA brought into the ED by EMS due to fall. Pt states she was at home walking to turn on the light in a bedroom when she tripped and fell onto her left shoulder. Fall was unwitnessed, states she remembers fall, admits to hitting head but denies LOC, chest pain, dizziness or weakness. Fell & pushed the life alert, EMS was called and she was brought to the ED. Lives alone and ambulates w a cane. Elicits left shoulder arm pain, denies chest pain, palpitations, SOB, dizziness or lethargy.   In the ED pt was 2LNS, Tylenol & Dilaudid. Ortho was consulted and assess pt in ED.   Last admission was in Sept 14, 2019 for upper back pain, was found to have T6 fracture from fall back in August 2019.  (09 Oct 2020 03:45)    10/10- was c/p pain earlier and was medicated by dilaudid. Subsequently, became slightly hypotensive.  10/11 pain control still an issue, requires injectable narcotics. Robert placed for urinary retention  10/12 Robert removed, voiding well. Crying a lot    Review of system- All 10 systems reviewed and is as per HPI otherwise negative.     Vital Signs Last 24 Hrs  T(C): 36.5 (12 Oct 2020 07:53), Max: 36.8 (11 Oct 2020 16:17)  T(F): 97.7 (12 Oct 2020 07:53), Max: 98.3 (11 Oct 2020 16:17)  HR: 98 (12 Oct 2020 09:45) (66 - 103)  BP: 115/53 (12 Oct 2020 09:45) (106/52 - 129/61)  BP(mean): --  RR: 18 (12 Oct 2020 07:53) (18 - 18)  SpO2: 92% (12 Oct 2020 07:53) (92% - 96%)    LABS:                        9.6    5.93  )-----------( 192      ( 11 Oct 2020 08:31 )             30.8     11 Oct 2020 08:31    138    |  106    |  16     ----------------------------<  92     4.4     |  26     |  0.84     Ca    8.9        11 Oct 2020 08:31    RADIOLOGY & ADDITIONAL TESTS:  EXAM:  XR SHOULDER COMP MIN 2V LT                        PROCEDURE DATE:  10/09/2020    INTERPRETATION:  EXAM:XR SHOULDER COMPLETE 2 VIEWS LEFT.     CLINICAL INDICATION: fall fracture/axillary .     TECHNIQUE: Axillary view of the left shoulder.     PRIOR EXAM: 10/08/2020.     FINDINGS:     No evidence of a dislocation. Comminuted fracture of the humeral head and surgical neck along with possibility of impaction.    IMPRESSION:    As above.    EXAM:  XR HAND MIN 3 VIEWS LT                        EXAM:  XR WRIST COMP MIN 3 VIEWS LT                        EXAM:  XR SHOULDER COMP MIN 2V LT                        EXAM:  XR HUMERUS MIN 2 VIEWS LT                        EXAM:  XR FOREARM 2 VIEWS LT                          PROCEDURE DATE:  10/08/2020    INTERPRETATION:  Left shoulder, humerus, forearm, wrist, and hand. Patient fell with local trauma.    Left shoulder. 3 views.    There is a fracture of the greater tuberosity. There is fairly advanced degeneration at the glenohumeral joint.  Dystrophic calcification is seen peripheral to the shoulder.    Left humerus. 2 views.  Bones are demineralized. Lower humerus appears intact.    Left forearm. 2 views.  Proximal forearm bones are unremarkable.    Left wrist. 3 views.  There is a fracture horizontal in nature of the distal radius with ventral angulation of the distal fragment.  There is moderate to advanced degeneration around the trapezium.    Left hand. 3 views.  There is scattered moderate IP degeneration.  Distal bones are intact.    IMPRESSION: Fracture of the greater tuberosity of the humerus. Distal radial fracture as above. Hand degeneration.    PHYSICAL EXAM:  GENERAL: NAD, well-groomed, well-developed  HEAD:  Atraumatic, Normocephalic  EYES: EOMI, PERRLA, conjunctiva and sclera clear  HEENT: Moist mucous membranes  NECK: Supple, No JVD  NERVOUS SYSTEM:  Alert & Oriented X3, Motor Strength 5/5 B/L upper and lower extremities; DTRs 2+ intact and symmetric  CHEST/LUNG: Clear to auscultation bilaterally; No rales, rhonchi, wheezing, or rubs  HEART: Regular rate and rhythm; No murmurs, rubs, or gallops  ABDOMEN: Soft, Nontender, Nondistended; Bowel sounds present  GENITOURINARY- Robert  EXTREMITIES:  significant hematomas LUE  MUSCULOSKELTAL- LUE in splint  SKIN-no rash, no lesion  CNS- alert, oriented X3, non focal     MEDICATIONS  (STANDING):  acetaminophen   Tablet .. 975 milliGRAM(s) Oral every 8 hours  aspirin  chewable 81 milliGRAM(s) Oral daily  bethanechol 25 milliGRAM(s) Oral every 8 hours  buPROPion XL . 300 milliGRAM(s) Oral daily  enoxaparin Injectable 40 milliGRAM(s) SubCutaneous daily  lactated ringers. 1000 milliLiter(s) (75 mL/Hr) IV Continuous <Continuous>  lidocaine   Patch 1 Patch Transdermal daily  magnesium citrate Oral Solution 1 Bottle Oral once  polyethylene glycol 3350 17 Gram(s) Oral two times a day  senna 2 Tablet(s) Oral at bedtime  thyroid 90 milliGRAM(s) Oral daily    MEDICATIONS  (PRN):  acetaminophen   Tablet .. 650 milliGRAM(s) Oral every 6 hours PRN Mild Pain (1 - 3)  bisacodyl 5 milliGRAM(s) Oral every 12 hours PRN Constipation  HYDROmorphone   Tablet 4 milliGRAM(s) Oral every 4 hours PRN Moderate Pain (4 - 6)  HYDROmorphone   Tablet 6 milliGRAM(s) Oral every 4 hours PRN Severe Pain (7 - 10)  HYDROmorphone  Injectable 0.5 milliGRAM(s) IV Push every 4 hours PRN breakthrough pain  ondansetron Injectable 4 milliGRAM(s) IV Push every 6 hours PRN Nausea and/or Vomiting    Assessment/Plan  #LUE proximal humeral fracture & distal radial fracture 2 to mechanical fall  #Anemia acute blood loss from fractures  Ortho eval appreciated  Reduced and placed in splint  NWB to RUE, keep in splint  Ice  Monitor HH  Pain management is still an issue- requires injectable narcotics for pain control  Will continue with Dilaudid 4mg/6mg for now  Bowel regimen  Incentive spirometry  PT eval- for [facility] once pain is controlled on oral meds    #Depression  Patient is crying a lot. Most of it due to grieving from the loss of her son to COVID  She admits being depressed  On Wellbutrin, will continue  Will get psychiatry eval to help adjust meds if needed    #Hyperkalemia- resolved  S/p Kayexalate 15mg  Hold Lisinopril / Aldactone     #ANDREZ- resolved  Diuretics on hold    #Mild hypotension likely 2 to narcotics  S/p IVF bolus. Better now    #Urinary retention s/p straight cath x2  Robert removed, voiding well  Cont bethanechol today    #Hypothyroidism  [place] Thyroid 90mg    #DVT PPX  Lovenox SQ. AC consult    #Advanced Directives  DNR/DNI    #COVID neg 10/9    #Dispo- continue pain management, likely [facility] once pain is controlled, probably tomorrow.  D/w pt and son over the phone

## 2020-10-12 NOTE — BEHAVIORAL HEALTH ASSESSMENT NOTE - SUMMARY
Pt is an 88 YOWWW with xh of depression in the light of loosing one of her sons to COVID in March or this year (He was 57). Pt is on Wellbutrin,. feeling better, Still on depressed side, but not anhedonic and not hopeless. No SI,. No HI,  AH, VH, PI. Pt admits that she was thinking  about dying after her sons death, but now she denies SI, she wants to live, she has plans for the future. She lives alone and has cat. She hires a women to help her clean the house.   Treatment plan is for pt to go to [facility].  Pt does not need inpatient psych care.   Continue buPROPion XL . 300 milliGRAM(s) Oral daily.     Pt can f/u with  psychiatrist,.

## 2020-10-12 NOTE — BEHAVIORAL HEALTH ASSESSMENT NOTE - SUICIDE PROTECTIVE FACTORS
Identifies reasons for living/Has future plans/Supportive social network of family or friends/Beloved pets

## 2020-10-12 NOTE — BEHAVIORAL HEALTH ASSESSMENT NOTE - HPI (INCLUDE ILLNESS QUALITY, SEVERITY, DURATION, TIMING, CONTEXT, MODIFYING FACTORS, ASSOCIATED SIGNS AND SYMPTOMS)
Pt is an 88 YOWWW with xh of depression in the light of loosing one of her sons to COVID in March or this year (He was 57). Pt is on Wellbutrin,. feeling better, Still on depressed side, but not anhedonic and not hopeless. No SI,. No HI,  AH, VH, PI. Pt admits that she was thinking  about dying after her sons death, but now she denies SI, she wants to live, she has plans for the future. She lives alone and has cat. She hires a women to help her clean the house.   Treatment plan is for pt to go to [facility].

## 2020-10-13 LAB
ANION GAP SERPL CALC-SCNC: 5 MMOL/L — SIGNIFICANT CHANGE UP (ref 5–17)
BUN SERPL-MCNC: 11 MG/DL — SIGNIFICANT CHANGE UP (ref 7–23)
CALCIUM SERPL-MCNC: 9 MG/DL — SIGNIFICANT CHANGE UP (ref 8.5–10.1)
CHLORIDE SERPL-SCNC: 103 MMOL/L — SIGNIFICANT CHANGE UP (ref 96–108)
CO2 SERPL-SCNC: 29 MMOL/L — SIGNIFICANT CHANGE UP (ref 22–31)
CREAT SERPL-MCNC: 0.76 MG/DL — SIGNIFICANT CHANGE UP (ref 0.5–1.3)
GLUCOSE SERPL-MCNC: 88 MG/DL — SIGNIFICANT CHANGE UP (ref 70–99)
HCT VFR BLD CALC: 33.8 % — LOW (ref 34.5–45)
HGB BLD-MCNC: 10.6 G/DL — LOW (ref 11.5–15.5)
MCHC RBC-ENTMCNC: 29 PG — SIGNIFICANT CHANGE UP (ref 27–34)
MCHC RBC-ENTMCNC: 31.4 GM/DL — LOW (ref 32–36)
MCV RBC AUTO: 92.6 FL — SIGNIFICANT CHANGE UP (ref 80–100)
PLATELET # BLD AUTO: 253 K/UL — SIGNIFICANT CHANGE UP (ref 150–400)
POTASSIUM SERPL-MCNC: 4.8 MMOL/L — SIGNIFICANT CHANGE UP (ref 3.5–5.3)
POTASSIUM SERPL-SCNC: 4.8 MMOL/L — SIGNIFICANT CHANGE UP (ref 3.5–5.3)
RBC # BLD: 3.65 M/UL — LOW (ref 3.8–5.2)
RBC # FLD: 12.3 % — SIGNIFICANT CHANGE UP (ref 10.3–14.5)
SARS-COV-2 RNA SPEC QL NAA+PROBE: SIGNIFICANT CHANGE UP
SODIUM SERPL-SCNC: 137 MMOL/L — SIGNIFICANT CHANGE UP (ref 135–145)
WBC # BLD: 7.21 K/UL — SIGNIFICANT CHANGE UP (ref 3.8–10.5)
WBC # FLD AUTO: 7.21 K/UL — SIGNIFICANT CHANGE UP (ref 3.8–10.5)

## 2020-10-13 PROCEDURE — 99232 SBSQ HOSP IP/OBS MODERATE 35: CPT

## 2020-10-13 RX ADMIN — Medication 975 MILLIGRAM(S): at 02:54

## 2020-10-13 RX ADMIN — LIDOCAINE 1 PATCH: 4 CREAM TOPICAL at 19:00

## 2020-10-13 RX ADMIN — HYDROMORPHONE HYDROCHLORIDE 4 MILLIGRAM(S): 2 INJECTION INTRAMUSCULAR; INTRAVENOUS; SUBCUTANEOUS at 21:18

## 2020-10-13 RX ADMIN — POLYETHYLENE GLYCOL 3350 17 GRAM(S): 17 POWDER, FOR SOLUTION ORAL at 21:23

## 2020-10-13 RX ADMIN — Medication 25 MILLIGRAM(S): at 21:23

## 2020-10-13 RX ADMIN — Medication 975 MILLIGRAM(S): at 17:43

## 2020-10-13 RX ADMIN — HYDROMORPHONE HYDROCHLORIDE 4 MILLIGRAM(S): 2 INJECTION INTRAMUSCULAR; INTRAVENOUS; SUBCUTANEOUS at 20:18

## 2020-10-13 RX ADMIN — Medication 25 MILLIGRAM(S): at 14:22

## 2020-10-13 RX ADMIN — Medication 90 MILLIGRAM(S): at 06:43

## 2020-10-13 RX ADMIN — LIDOCAINE 1 PATCH: 4 CREAM TOPICAL at 09:13

## 2020-10-13 RX ADMIN — BUPROPION HYDROCHLORIDE 300 MILLIGRAM(S): 150 TABLET, EXTENDED RELEASE ORAL at 09:13

## 2020-10-13 RX ADMIN — HYDROMORPHONE HYDROCHLORIDE 6 MILLIGRAM(S): 2 INJECTION INTRAMUSCULAR; INTRAVENOUS; SUBCUTANEOUS at 11:36

## 2020-10-13 RX ADMIN — HYDROMORPHONE HYDROCHLORIDE 6 MILLIGRAM(S): 2 INJECTION INTRAMUSCULAR; INTRAVENOUS; SUBCUTANEOUS at 01:26

## 2020-10-13 RX ADMIN — SODIUM CHLORIDE 75 MILLILITER(S): 9 INJECTION, SOLUTION INTRAVENOUS at 11:43

## 2020-10-13 RX ADMIN — SENNA PLUS 2 TABLET(S): 8.6 TABLET ORAL at 21:23

## 2020-10-13 RX ADMIN — Medication 25 MILLIGRAM(S): at 06:43

## 2020-10-13 RX ADMIN — HYDROMORPHONE HYDROCHLORIDE 6 MILLIGRAM(S): 2 INJECTION INTRAMUSCULAR; INTRAVENOUS; SUBCUTANEOUS at 12:29

## 2020-10-13 RX ADMIN — Medication 81 MILLIGRAM(S): at 09:12

## 2020-10-13 RX ADMIN — POLYETHYLENE GLYCOL 3350 17 GRAM(S): 17 POWDER, FOR SOLUTION ORAL at 09:13

## 2020-10-13 RX ADMIN — LIDOCAINE 1 PATCH: 4 CREAM TOPICAL at 21:23

## 2020-10-13 RX ADMIN — Medication 975 MILLIGRAM(S): at 11:35

## 2020-10-13 RX ADMIN — ENOXAPARIN SODIUM 40 MILLIGRAM(S): 100 INJECTION SUBCUTANEOUS at 09:13

## 2020-10-13 RX ADMIN — HYDROMORPHONE HYDROCHLORIDE 6 MILLIGRAM(S): 2 INJECTION INTRAMUSCULAR; INTRAVENOUS; SUBCUTANEOUS at 23:54

## 2020-10-13 RX ADMIN — Medication 975 MILLIGRAM(S): at 09:12

## 2020-10-13 NOTE — PROGRESS NOTE ADULT - SUBJECTIVE AND OBJECTIVE BOX
CC- s/p mechanical fall    HPI:  87 yo F w PMH of HTN, hypothyroidism & Right knee OA brought into the ED by EMS due to fall. Pt states she was at home walking to turn on the light in a bedroom when she tripped and fell onto her left shoulder. Fall was unwitnessed, states she remembers fall, admits to hitting head but denies LOC, chest pain, dizziness or weakness. Fell & pushed the life alert, EMS was called and she was brought to the ED. Lives alone and ambulates w a cane. Elicits left shoulder arm pain, denies chest pain, palpitations, SOB, dizziness or lethargy.   In the ED pt was 2LNS, Tylenol & Dilaudid. Ortho was consulted and assess pt in ED.   Last admission was in Sept 14, 2019 for upper back pain, was found to have T6 fracture from fall back in August 2019.  (09 Oct 2020 03:45)    10/10- was c/p pain earlier and was medicated by dilaudid. Subsequently, became slightly hypotensive.  10/11 pain control still an issue, requires injectable narcotics. Robert placed for urinary retention  10/12 Robert removed, voiding well. Crying a lot  10/13 pain is controlled on PO meds. Still crying    Review of system- All 10 systems reviewed and is as per HPI otherwise negative.     Vital Signs Last 24 Hrs  T(C): 36.3 (13 Oct 2020 11:22), Max: 36.4 (12 Oct 2020 16:01)  T(F): 97.3 (13 Oct 2020 11:22), Max: 97.6 (12 Oct 2020 16:01)  HR: 90 (13 Oct 2020 11:22) (87 - 99)  BP: 114/52 (13 Oct 2020 11:22) (112/60 - 131/63)  BP(mean): --  RR: 18 (13 Oct 2020 11:22) (17 - 18)  SpO2: 94% (13 Oct 2020 08:52) (94% - 100%)    LABS:                        10.6   7.21  )-----------( 253      ( 13 Oct 2020 10:11 )             33.8     13 Oct 2020 10:11    137    |  103    |  11     ----------------------------<  88     4.8     |  29     |  0.76     Ca    9.0        13 Oct 2020 10:11    RADIOLOGY & ADDITIONAL TESTS:  EXAM:  XR SHOULDER COMP MIN 2V LT                        PROCEDURE DATE:  10/09/2020    INTERPRETATION:  EXAM:XR SHOULDER COMPLETE 2 VIEWS LEFT.     CLINICAL INDICATION: fall fracture/axillary .     TECHNIQUE: Axillary view of the left shoulder.     PRIOR EXAM: 10/08/2020.     FINDINGS:     No evidence of a dislocation. Comminuted fracture of the humeral head and surgical neck along with possibility of impaction.    IMPRESSION:    As above.    EXAM:  XR HAND MIN 3 VIEWS LT                        EXAM:  XR WRIST COMP MIN 3 VIEWS LT                        EXAM:  XR SHOULDER COMP MIN 2V LT                        EXAM:  XR HUMERUS MIN 2 VIEWS LT                        EXAM:  XR FOREARM 2 VIEWS LT                          PROCEDURE DATE:  10/08/2020    INTERPRETATION:  Left shoulder, humerus, forearm, wrist, and hand. Patient fell with local trauma.    Left shoulder. 3 views.    There is a fracture of the greater tuberosity. There is fairly advanced degeneration at the glenohumeral joint.  Dystrophic calcification is seen peripheral to the shoulder.    Left humerus. 2 views.  Bones are demineralized. Lower humerus appears intact.    Left forearm. 2 views.  Proximal forearm bones are unremarkable.    Left wrist. 3 views.  There is a fracture horizontal in nature of the distal radius with ventral angulation of the distal fragment.  There is moderate to advanced degeneration around the trapezium.    Left hand. 3 views.  There is scattered moderate IP degeneration.  Distal bones are intact.    IMPRESSION: Fracture of the greater tuberosity of the humerus. Distal radial fracture as above. Hand degeneration.    PHYSICAL EXAM:  GENERAL: NAD, well-groomed, well-developed  HEAD:  Atraumatic, Normocephalic  EYES: EOMI, PERRLA, conjunctiva and sclera clear  HEENT: Moist mucous membranes  NECK: Supple, No JVD  NERVOUS SYSTEM:  Alert & Oriented X3, Motor Strength 5/5 B/L upper and lower extremities; DTRs 2+ intact and symmetric  CHEST/LUNG: Clear to auscultation bilaterally; No rales, rhonchi, wheezing, or rubs  HEART: Regular rate and rhythm; No murmurs, rubs, or gallops  ABDOMEN: Soft, Nontender, Nondistended; Bowel sounds present  GENITOURINARY- voiding  EXTREMITIES:  significant hematomas LUE  MUSCULOSKELTAL- LUE in splint  SKIN-no rash, no lesion  CNS- alert, oriented X3, non focal     MEDICATIONS  (STANDING):  acetaminophen   Tablet .. 975 milliGRAM(s) Oral every 8 hours  aspirin  chewable 81 milliGRAM(s) Oral daily  bethanechol 25 milliGRAM(s) Oral every 8 hours  buPROPion XL . 300 milliGRAM(s) Oral daily  enoxaparin Injectable 40 milliGRAM(s) SubCutaneous daily  lactated ringers. 1000 milliLiter(s) (75 mL/Hr) IV Continuous <Continuous>  lidocaine   Patch 1 Patch Transdermal daily  magnesium citrate Oral Solution 1 Bottle Oral once  polyethylene glycol 3350 17 Gram(s) Oral two times a day  senna 2 Tablet(s) Oral at bedtime  thyroid 90 milliGRAM(s) Oral daily    MEDICATIONS  (PRN):  acetaminophen   Tablet .. 650 milliGRAM(s) Oral every 6 hours PRN Mild Pain (1 - 3)  bisacodyl 5 milliGRAM(s) Oral every 12 hours PRN Constipation  HYDROmorphone   Tablet 4 milliGRAM(s) Oral every 4 hours PRN Moderate Pain (4 - 6)  HYDROmorphone   Tablet 6 milliGRAM(s) Oral every 4 hours PRN Severe Pain (7 - 10)  HYDROmorphone  Injectable 0.5 milliGRAM(s) IV Push every 4 hours PRN breakthrough pain  ondansetron Injectable 4 milliGRAM(s) IV Push every 6 hours PRN Nausea and/or Vomiting    Assessment/Plan  #LUE proximal humeral fracture & distal radial fracture 2 to mechanical fall  #Anemia acute blood loss from fractures  Ortho eval appreciated  Reduced and placed in splint  NWB to RUE, keep in splint  Ice  Monitor HH  Will continue with Dilaudid 4mg/6mg for now  Bowel regimen- +BM  Incentive spirometry  PT eval- for DAYSI once pain is controlled on oral meds    #Depression  Patient is crying a lot. Most of it due to grieving from the loss of her son to COVID  She admits being depressed  On Wellbutrin, will continue  Psych eval appreciated    #Hyperkalemia- resolved  S/p Kayexalate 15mg  Hold Lisinopril / Aldactone     #ANDREZ- resolved  Diuretics on hold    #Mild hypotension likely 2 to narcotics  S/p IVF bolus. Better now    #Urinary retention s/p straight cath x2  Robert removed, voiding well  Cont bethanechol today    #Hypothyroidism  [place] Thyroid 90mg    #DVT PPX  Lovenox SQ. AC consult    #Advanced Directives  DNR/DNI    #COVID neg 10/9. Repeat swab for rehab placement    #Dispo- [facility] probably tomorrow.  D/w pt

## 2020-10-13 NOTE — PROGRESS NOTE ADULT - ASSESSMENT
Patient is a 88y old  Female who presents with a chief complaint of Fall, sustained L proximal humerus & distal radius fracture s/p closed reduction of the left humerus fracture. Consulted by     for VTE prophylaxis, risk stratification, and anticoagulation management. patient moderate risk for VTE due to age, restricted mobility, and NWB of the upper extremity, low bleeding risk.    Plan  ::Continue Lovenox 40mg sq daily for 2 weeks   ::Daily cbc/bmp  ::LE Venodynes  ::Increase mobility as tolerated    Will continue to follow.  Dispo: Rehab

## 2020-10-13 NOTE — PROGRESS NOTE ADULT - SUBJECTIVE AND OBJECTIVE BOX
HPI:  87 yo F w PMH of HTN, hypothyroidism & Right knee OA brought into the ED by EMS due to fall. Pt states she was at home walking to turn on the light in a bedroom when she tripped and fell onto her left shoulder. Fall was unwitnessed, states she remembers fall, admits to hitting head but denies LOC, chest pain, dizziness or weakness. Fell & pushed the life alert, EMS was called and she was brought to the ED. Lives alone and ambulates w a cane. Elicits left shoulder arm pain, denies chest pain, palpitations, SOB, dizziness or lethargy.   In the ED pt was 2LNS, Tylenol & Dilaudid. Ortho was consulted and assess pt in ED.   Last admission was in 2019 for upper back pain, was found to have T6 fracture from fall back in 2019.  (09 Oct 2020 03:45)      Patient is a 88y old  Female who presents with a chief complaint of Fall, sustainted L proximal humerus & distal radius fracture s/p closed reduction of the left humerus fracture. Consulted by     for VTE prophylaxis, risk stratification, and anticoagulation management.        PAST MEDICAL & SURGICAL HISTORY:  Hypertension    Osteoarthritis, localized  Right knee    Hypothyroid    History of cholecystectomy    History of appendectomy    Interval History  10/12/20:Patient seen at bedside, denies any pain discussed anticoagulation with Lovenox while in the hospital as she is not amulated yet and NWB on the left Upper extremity, Patient denies any H/o VTE events , stoke or Cancer    10/13: Patient seen at bedside reporting pain in left shoulder and "So stiff," in left hand/fingers. Has sensation, ecchymosis on left hand right eye, and left shoulder radiating to bicep. She also reports pain in right knee "But that was before all of this," as she was receiving cortisone injections outpatient. Explained Lovenox to patient again reiterating importance of venodynes and Lovenox to prevent blood clots. Patient verbalized understanding.        CrCl:55.9  BMI:29.2    Caprini VTE Risk Score:CAPRINI SCORE  AGE RELATED RISK FACTORS                                                       MOBILITY RELATED FACTORS  [ ] Age 41-60 years                                            (1 Point)                  [x ] Bed rest /restricted mobility                             (1 Point)  [ ] Age: 61-74 years                                           (2 Points)                [x ] Plaster cast                                                   (2 Points)  [x ] Age= 75 years                                              (3 Points)                 [ ] Bed bound for more than 72 hours                   (2 Points)    DISEASE RELATED RISK FACTORS                                               GENDER SPECIFIC FACTORS  [ ] Edema in the lower extremities                       (1 Point)           [ ] Pregnancy                                                            (1 Point)  [ ] Varicose veins                                               (1 Point)                  [ ] Post-partum < 6 weeks                                      (1 Point)             [x ] BMI > 25 Kg/m2                                            (1 Point)                  [ ] Hormonal therapy or oral contraception       (1 Point)                 [ ] Sepsis (in the previous month)                        (1 Point)             [ ] History of pregnancy complications                (1Point)  [ ] Pneumonia or serious lung disease                                             [ ] Unexplained or recurrent  (=/>3), premature                                 (In the previous month)                               (1 Point)                birth with toxemia or growth-restricted infant (1 Point)  [ ] Abnormal pulmonary function test            (1 Point)                                   SURGERY RELATED RISK FACTORS  [ ] Acute myocardial infarction                       (1 Point)                  [ ]  Section                                         (1 Point)  [ ] Congestive heart failure (in the previous month) (1 Point)   [ ] Minor surgery   lasting <45 minutes       (1 Point)   [ ] Inflammatory bowel disease                             (1 Point)          [ ] Arthroscopic surgery                                  (2 Points)  [ ] Central venous access                                    (2 Points)            [ ] General surgery lasting >45 minutes      (2 Points)       [ ] Stroke (in the previous month)                  (5 Points)            [ ] Elective major lower extremity arthroplasty (5 Points)                                   [  ] Malignancy (present or past include skin melanoma                                          but exclude  basal skin cell)    (2 points)                                      TRAUMA RELATED RISK FACTORS                HEMATOLOGY RELATED FACTORS                                  [ ] Fracture of the hip, pelvis, or leg                       (5 Points)  [ ] Prior episodes of VTE                                     (3 Points)          [ ] Acute spinal cord injury (in the previous month)  (5 Points)  [ ] Positive family history for VTE                         (3 Points)       [ ] Paralysis (less than 1 month)                          (5 Points)  [ ] Prothrombin 20345 A                                      (3 Points)         [ ] Multiple Trauma (within 1month)                 (5Points)                                                                                                                                                                [ ] Factor V Leiden                                          (3 Points)                                OTHER RISK FACTORS                          [ ] Lupus anticoagulants                                     (3 Points)                       [ ] BMI > 40                          (1 Point)                                                         [ ] Anticardiolipin antibodies                                (3 Points)                   [ ] Smoking                              (1Point)                                                [ ] High homocysteine in the blood                      (3 Points)                [  ] Diabetes requiring insulin (1point)                         [ ] Other congenital or acquired thrombophilia       (3 Points)          [  ] Chemotherapy                   (1 Point)  [ ] Heparin induced thrombocytopenia                  (3 Points)             [  ] Blood Transfusion                (1 point)                                                                                                             Total Score [     7     ]                                                                                                                                                                                                                                                                                                                                                                                                                                         IMPROVE Bleeding Risk Score: 3.5    Falls Risk:   High ( x )  Mod (  )  Low (  )      FAMILY HISTORY:    Denies any personal or familial history of clotting or bleeding disorders.    Allergies    oxycodone (Hives; Flushing)  sulfa drugs (Other)    Intolerances        REVIEW OF SYSTEMS    (  )Fever	     (  )Constipation	(  )SOB				(  )Headache	(  )Dysuria  (  )Chills	     (  )Melena	(  )Dyspnea present on exertion	                    (  )Dizziness                    (  )Polyuria  (  )Nausea	     (  )Hematochezia	(  )Cough			                    (  )Syncope   	(  )Hematuria  (  )Vomiting    (  )Chest Pain	(  )Wheezing			(  )Weakness  (  )Diarrhea     (  )Palpitations	(  )Anorexia			( x )joint pain    All  other review of systems negative: Yes    Vital Signs Last 24 Hrs  T(C): 36.4 (10-13-20 @ 08:52), Max: 36.4 (10-12-20 @ 16:01)  T(F): 97.5 (10-13-20 @ 08:52), Max: 97.6 (10-12-20 @ 16:01)  HR: 90 (10-13-20 @ 08:52) (87 - 99)  BP: 129/67 (10-13-20 @ 08:52) (112/60 - 131/63)  BP(mean): --  RR: 18 (10-13-20 @ 08:52) (17 - 18)  SpO2: 94% (10-13-20 @ 08:52) (94% - 100%)    PHYSICAL EXAM:    Constitutional: Appears Well    Neurological: A& O x 3    Skin: Warm    Respiratory and Thorax: normal effort; Breath sounds: normal; No rales/wheezing/rhonchi  	  Cardiovascular: S1, S2, regular, NMBR	    Gastrointestinal: BS + x 4Q, nontender	    Genitourinary:  Bladder nondistended, nontender    Musculoskeletal:   General Right:   no muscle/joint tenderness,   normal tone, no joint swelling,   ROM: full	    General Left:   + muscle/joint tenderness,   normal tone, no joint swelling,   ROM: limited        Shoulder: Lt: Drsing CDI; Sling noted; Cap refill good;  Sensation intact    Lower extrems:   Right: no calf tenderness              negative cassie's sign               + pedal pulses    Left:   no calf tenderness              negative cassie's sign               + pedal pulses                                    10.6   7.21  )-----------( 253      ( 13 Oct 2020 10:11 )             33.8       10-13    137  |  103  |  11  ----------------------------<  88  4.8   |  29  |  0.76    Ca    9.0      13 Oct 2020 10:11                        9.6    5.93  )-----------( 192      ( 11 Oct 2020 08:31 )             30.8       10-11    138  |  106  |  16  ----------------------------<  92  4.4   |  26  |  0.84    Ca    8.9      11 Oct 2020 08:31        				    MEDICATIONS  (STANDING):  acetaminophen   Tablet .. 975 milliGRAM(s) Oral every 8 hours  aspirin  chewable 81 milliGRAM(s) Oral daily  bethanechol 25 milliGRAM(s) Oral every 8 hours  buPROPion XL . 300 milliGRAM(s) Oral daily  enoxaparin Injectable 40 milliGRAM(s) SubCutaneous daily  lactated ringers. 1000 milliLiter(s) IV Continuous <Continuous>  lidocaine   Patch 1 Patch Transdermal daily  magnesium citrate Oral Solution 1 Bottle Oral once  polyethylene glycol 3350 17 Gram(s) Oral two times a day  senna 2 Tablet(s) Oral at bedtime  thyroid 90 milliGRAM(s) Oral daily          DVT Prophylaxis:  LMWH                   ( x )  Heparin SQ           (  )  Coumadin             (  )  Xarelto                  (  )  Eliquis                   (  )  Venodynes           (x  )  Ambulation          (x  )  UFH                       (  )  Contraindicated  (  )  EC ASPIRIN       (  )

## 2020-10-14 ENCOUNTER — TRANSCRIPTION ENCOUNTER (OUTPATIENT)
Age: 85
End: 2020-10-14

## 2020-10-14 VITALS
HEART RATE: 87 BPM | OXYGEN SATURATION: 94 % | RESPIRATION RATE: 18 BRPM | TEMPERATURE: 98 F | SYSTOLIC BLOOD PRESSURE: 116 MMHG | DIASTOLIC BLOOD PRESSURE: 59 MMHG

## 2020-10-14 PROCEDURE — 99238 HOSP IP/OBS DSCHRG MGMT 30/<: CPT

## 2020-10-14 PROCEDURE — 99239 HOSP IP/OBS DSCHRG MGMT >30: CPT

## 2020-10-14 RX ORDER — SPIRONOLACTONE 25 MG/1
1 TABLET, FILM COATED ORAL
Qty: 0 | Refills: 0 | DISCHARGE

## 2020-10-14 RX ORDER — LISINOPRIL 2.5 MG/1
1 TABLET ORAL
Qty: 0 | Refills: 0 | DISCHARGE

## 2020-10-14 RX ORDER — ACETAMINOPHEN 500 MG
2 TABLET ORAL
Qty: 0 | Refills: 0 | DISCHARGE
Start: 2020-10-14

## 2020-10-14 RX ORDER — BETHANECHOL CHLORIDE 25 MG
1 TABLET ORAL
Qty: 0 | Refills: 0 | DISCHARGE
Start: 2020-10-14

## 2020-10-14 RX ORDER — FUROSEMIDE 40 MG
1 TABLET ORAL
Qty: 0 | Refills: 0 | DISCHARGE

## 2020-10-14 RX ORDER — POLYETHYLENE GLYCOL 3350 17 G/17G
17 POWDER, FOR SOLUTION ORAL
Qty: 0 | Refills: 0 | DISCHARGE
Start: 2020-10-14

## 2020-10-14 RX ORDER — ASPIRIN/CALCIUM CARB/MAGNESIUM 324 MG
1 TABLET ORAL
Qty: 0 | Refills: 0 | DISCHARGE
Start: 2020-10-14

## 2020-10-14 RX ORDER — HYDROMORPHONE HYDROCHLORIDE 2 MG/ML
1 INJECTION INTRAMUSCULAR; INTRAVENOUS; SUBCUTANEOUS
Qty: 0 | Refills: 0 | DISCHARGE
Start: 2020-10-14

## 2020-10-14 RX ORDER — SENNA PLUS 8.6 MG/1
2 TABLET ORAL
Qty: 0 | Refills: 0 | DISCHARGE
Start: 2020-10-14

## 2020-10-14 RX ORDER — ENOXAPARIN SODIUM 100 MG/ML
40 INJECTION SUBCUTANEOUS
Qty: 0 | Refills: 0 | DISCHARGE
Start: 2020-10-14

## 2020-10-14 RX ORDER — HYDROMORPHONE HYDROCHLORIDE 2 MG/ML
3 INJECTION INTRAMUSCULAR; INTRAVENOUS; SUBCUTANEOUS
Qty: 0 | Refills: 0 | DISCHARGE
Start: 2020-10-14

## 2020-10-14 RX ORDER — LIDOCAINE 4 G/100G
1 CREAM TOPICAL
Qty: 0 | Refills: 0 | DISCHARGE
Start: 2020-10-14

## 2020-10-14 RX ADMIN — Medication 975 MILLIGRAM(S): at 10:20

## 2020-10-14 RX ADMIN — Medication 81 MILLIGRAM(S): at 09:51

## 2020-10-14 RX ADMIN — BUPROPION HYDROCHLORIDE 300 MILLIGRAM(S): 150 TABLET, EXTENDED RELEASE ORAL at 09:51

## 2020-10-14 RX ADMIN — Medication 25 MILLIGRAM(S): at 05:23

## 2020-10-14 RX ADMIN — HYDROMORPHONE HYDROCHLORIDE 6 MILLIGRAM(S): 2 INJECTION INTRAMUSCULAR; INTRAVENOUS; SUBCUTANEOUS at 15:21

## 2020-10-14 RX ADMIN — HYDROMORPHONE HYDROCHLORIDE 6 MILLIGRAM(S): 2 INJECTION INTRAMUSCULAR; INTRAVENOUS; SUBCUTANEOUS at 08:42

## 2020-10-14 RX ADMIN — LIDOCAINE 1 PATCH: 4 CREAM TOPICAL at 09:45

## 2020-10-14 RX ADMIN — POLYETHYLENE GLYCOL 3350 17 GRAM(S): 17 POWDER, FOR SOLUTION ORAL at 09:51

## 2020-10-14 RX ADMIN — Medication 975 MILLIGRAM(S): at 09:50

## 2020-10-14 RX ADMIN — HYDROMORPHONE HYDROCHLORIDE 6 MILLIGRAM(S): 2 INJECTION INTRAMUSCULAR; INTRAVENOUS; SUBCUTANEOUS at 08:12

## 2020-10-14 RX ADMIN — Medication 25 MILLIGRAM(S): at 13:19

## 2020-10-14 RX ADMIN — ENOXAPARIN SODIUM 40 MILLIGRAM(S): 100 INJECTION SUBCUTANEOUS at 09:51

## 2020-10-14 RX ADMIN — SODIUM CHLORIDE 75 MILLILITER(S): 9 INJECTION, SOLUTION INTRAVENOUS at 00:51

## 2020-10-14 RX ADMIN — Medication 90 MILLIGRAM(S): at 05:24

## 2020-10-14 RX ADMIN — HYDROMORPHONE HYDROCHLORIDE 6 MILLIGRAM(S): 2 INJECTION INTRAMUSCULAR; INTRAVENOUS; SUBCUTANEOUS at 14:57

## 2020-10-14 RX ADMIN — Medication 975 MILLIGRAM(S): at 01:14

## 2020-10-14 NOTE — DISCHARGE NOTE NURSING/CASE MANAGEMENT/SOCIAL WORK - PATIENT PORTAL LINK FT
You can access the FollowMyHealth Patient Portal offered by [facility] by registering at the following website: [email or web address]. By joining [employer]’s FollowMyHealth portal, you will also be able to view your health information using other applications (apps) compatible with our system.

## 2020-10-14 NOTE — DISCHARGE NOTE PROVIDER - HOSPITAL COURSE
Patient presented s/p fall with LT humerus fracture and LT radius fracture, non-surgical. Placed in a splint. Patient is NWB to LUE- to see DR Nowak as outpatinet in 1 week for f/u imaging studies

## 2020-10-14 NOTE — DISCHARGE NOTE PROVIDER - NSDCMRMEDTOKEN_GEN_ALL_CORE_FT
acetaminophen 325 mg oral tablet: 2 tab(s) orally every 6 hours, As needed, Mild Pain (1 - 3)  Hospers Thyroid 90 mg oral tablet: 1 tab(s) orally once a day  aspirin 81 mg oral tablet, chewable: 1 tab(s) orally once a day  bethanechol 25 mg oral tablet: 1 tab(s) orally every 8 hours for 1 month   bisacodyl 5 mg oral delayed release tablet: 1 tab(s) orally every 12 hours, As needed, Constipation  buPROPion 300 mg/24 hours (XL) oral tablet, extended release: 1 tab(s) orally every 24 hours  enoxaparin: 40 milligram(s) subcutaneous once a day x 2 weeks  HYDROmorphone 2 mg oral tablet: 3 tab(s) orally every 4 hours, As needed, Severe Pain (7 - 10). Reduce pain meds in 1 week  HYDROmorphone 4 mg oral tablet: 1 tab(s) orally every 4 hours, As needed, Moderate Pain (4 - 6). Reduce pain meds in 1 week  lidocaine 5% topical film: Apply topically to affected area once a day to LT shoulder area  polyethylene glycol 3350 oral powder for reconstitution: 17 gram(s) orally 2 times a day  senna oral tablet: 2 tab(s) orally once a day (at bedtime)

## 2020-10-14 NOTE — DISCHARGE NOTE PROVIDER - NSDCCPCAREPLAN_GEN_ALL_CORE_FT
PRINCIPAL DISCHARGE DIAGNOSIS  Diagnosis: Closed fracture of left shoulder, initial encounter  Assessment and Plan of Treatment: Camila to [facility]OLORES      SECONDARY DISCHARGE DIAGNOSES  Diagnosis: Closed fracture of left wrist, initial encounter  Assessment and Plan of Treatment:

## 2020-10-14 NOTE — DISCHARGE NOTE PROVIDER - CARE PROVIDER_API CALL
Girish Nowak  ORTHOPAEDIC SURGERY  [address] [place] [address]  Phone: [phone]  Fax: [phone]  Follow Up Time: 1 week

## 2020-10-14 NOTE — PROGRESS NOTE ADULT - SUBJECTIVE AND OBJECTIVE BOX
CC- s/p mechanical fall    HPI:  87 yo F w PMH of HTN, hypothyroidism & Right knee OA brought into the ED by EMS due to fall. Pt states she was at home walking to turn on the light in a bedroom when she tripped and fell onto her left shoulder. Fall was unwitnessed, states she remembers fall, admits to hitting head but denies LOC, chest pain, dizziness or weakness. Fell & pushed the life alert, EMS was called and she was brought to the ED. Lives alone and ambulates w a cane. Elicits left shoulder arm pain, denies chest pain, palpitations, SOB, dizziness or lethargy.   In the ED pt was 2LNS, Tylenol & Dilaudid. Ortho was consulted and assess pt in ED.   Last admission was in Sept 14, 2019 for upper back pain, was found to have T6 fracture from fall back in August 2019.  (09 Oct 2020 03:45)    10/10- was c/p pain earlier and was medicated by dilaudid. Subsequently, became slightly hypotensive.  10/11 pain control still an issue, requires injectable narcotics. Robert placed for urinary retention  10/12 Robert removed, voiding well. Crying a lot  10/13 pain is controlled on PO meds. Still crying  10/14 no acute events. Agreeable with discharge to rehab    Review of system- All 10 systems reviewed and is as per HPI otherwise negative.     Vital Signs Last 24 Hrs  T(C): 36.8 (14 Oct 2020 08:40), Max: 36.9 (14 Oct 2020 00:20)  T(F): 98.2 (14 Oct 2020 08:40), Max: 98.5 (14 Oct 2020 00:20)  HR: 91 (14 Oct 2020 08:40) (84 - 97)  BP: 132/73 (14 Oct 2020 08:40) (122/63 - 139/62)  BP(mean): --  RR: 18 (14 Oct 2020 08:40) (18 - 18)  SpO2: 95% (14 Oct 2020 08:40) (92% - 97%)    LABS:                        10.6   7.21  )-----------( 253      ( 13 Oct 2020 10:11 )             33.8     13 Oct 2020 10:11    137    |  103    |  11     ----------------------------<  88     4.8     |  29     |  0.76     Ca    9.0        13 Oct 2020 10:11    RADIOLOGY & ADDITIONAL TESTS:  EXAM:  XR SHOULDER COMP MIN 2V LT                        PROCEDURE DATE:  10/09/2020    INTERPRETATION:  EXAM:XR SHOULDER COMPLETE 2 VIEWS LEFT.     CLINICAL INDICATION: fall fracture/axillary .     TECHNIQUE: Axillary view of the left shoulder.     PRIOR EXAM: 10/08/2020.     FINDINGS:     No evidence of a dislocation. Comminuted fracture of the humeral head and surgical neck along with possibility of impaction.    IMPRESSION:    As above.    EXAM:  XR HAND MIN 3 VIEWS LT                        EXAM:  XR WRIST COMP MIN 3 VIEWS LT                        EXAM:  XR SHOULDER COMP MIN 2V LT                        EXAM:  XR HUMERUS MIN 2 VIEWS LT                        EXAM:  XR FOREARM 2 VIEWS LT                          PROCEDURE DATE:  10/08/2020    INTERPRETATION:  Left shoulder, humerus, forearm, wrist, and hand. Patient fell with local trauma.    Left shoulder. 3 views.    There is a fracture of the greater tuberosity. There is fairly advanced degeneration at the glenohumeral joint.  Dystrophic calcification is seen peripheral to the shoulder.    Left humerus. 2 views.  Bones are demineralized. Lower humerus appears intact.    Left forearm. 2 views.  Proximal forearm bones are unremarkable.    Left wrist. 3 views.  There is a fracture horizontal in nature of the distal radius with ventral angulation of the distal fragment.  There is moderate to advanced degeneration around the trapezium.    Left hand. 3 views.  There is scattered moderate IP degeneration.  Distal bones are intact.    IMPRESSION: Fracture of the greater tuberosity of the humerus. Distal radial fracture as above. Hand degeneration.    PHYSICAL EXAM:  GENERAL: NAD, well-groomed, well-developed  HEAD:  Atraumatic, Normocephalic  EYES: EOMI, PERRLA, conjunctiva and sclera clear  HEENT: Moist mucous membranes  NECK: Supple, No JVD  NERVOUS SYSTEM:  Alert & Oriented X3, Motor Strength 5/5 B/L upper and lower extremities; DTRs 2+ intact and symmetric  CHEST/LUNG: Clear to auscultation bilaterally; No rales, rhonchi, wheezing, or rubs  HEART: Regular rate and rhythm; No murmurs, rubs, or gallops  ABDOMEN: Soft, Nontender, Nondistended; Bowel sounds present  GENITOURINARY- voiding  EXTREMITIES:  significant hematomas LUE  MUSCULOSKELTAL- LUE in splint  SKIN-no rash, no lesion  CNS- alert, oriented X3, non focal     MEDICATIONS  (STANDING):  acetaminophen   Tablet .. 975 milliGRAM(s) Oral every 8 hours  aspirin  chewable 81 milliGRAM(s) Oral daily  bethanechol 25 milliGRAM(s) Oral every 8 hours  buPROPion XL . 300 milliGRAM(s) Oral daily  enoxaparin Injectable 40 milliGRAM(s) SubCutaneous daily  lactated ringers. 1000 milliLiter(s) (75 mL/Hr) IV Continuous <Continuous>  lidocaine   Patch 1 Patch Transdermal daily  magnesium citrate Oral Solution 1 Bottle Oral once  polyethylene glycol 3350 17 Gram(s) Oral two times a day  senna 2 Tablet(s) Oral at bedtime  thyroid 90 milliGRAM(s) Oral daily    MEDICATIONS  (PRN):  acetaminophen   Tablet .. 650 milliGRAM(s) Oral every 6 hours PRN Mild Pain (1 - 3)  bisacodyl 5 milliGRAM(s) Oral every 12 hours PRN Constipation  HYDROmorphone   Tablet 4 milliGRAM(s) Oral every 4 hours PRN Moderate Pain (4 - 6)  HYDROmorphone   Tablet 6 milliGRAM(s) Oral every 4 hours PRN Severe Pain (7 - 10)  HYDROmorphone  Injectable 0.5 milliGRAM(s) IV Push every 4 hours PRN breakthrough pain  ondansetron Injectable 4 milliGRAM(s) IV Push every 6 hours PRN Nausea and/or Vomiting    Assessment/Plan  #LUE proximal humeral fracture & distal radial fracture 2 to mechanical fall  #Anemia acute blood loss from fractures  Ortho eval appreciated  Reduced and placed in splint  NWB to RUE, keep in splint  Ice  Monitor HH  Will continue with Dilaudid 4mg/6mg for now  Bowel regimen- +BM  Incentive spirometry  Lovenox x2 weeks  DAYSI today    #Depression  Patient is crying a lot. Most of it due to grieving from the loss of her son to COVID  She admits being depressed  On Wellbutrin, will continue  Psych eval appreciated    #Hyperkalemia- resolved  S/p Kayexalate 15mg  Hold Lisinopril / Aldactone     #ANDREZ- resolved  Diuretics on hold    #Mild hypotension likely 2 to narcotics  S/p IVF bolus. Resolved    #Urinary retention s/p straight cath x2  Robert removed, voiding well  Cont bethanechol  x30 days    #Hypothyroidism  Momence Thyroid 90mg    #DVT PPX  Lovenox SQ x 2 weeks    #Advanced Directives  DNR/DNI    #COVID neg 10/9, 10/13    #Dispo- DAYSI today. DC time 45 mins.  D/w pt and son Ash on the phone

## 2020-10-14 NOTE — PROGRESS NOTE ADULT - REASON FOR ADMISSION
Fall, L proximal humerus & distal radius fracture

## 2020-10-16 DIAGNOSIS — R33.9 RETENTION OF URINE, UNSPECIFIED: ICD-10-CM
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
## 2020-10-16 DIAGNOSIS — S52.502A UNSPECIFIED FRACTURE OF THE LOWER END OF LEFT RADIUS, INITIAL ENCOUNTER FOR CLOSED FRACTURE: ICD-10-CM

## 2020-10-16 DIAGNOSIS — I10 ESSENTIAL (PRIMARY) HYPERTENSION: ICD-10-CM
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
## 2020-10-16 DIAGNOSIS — I95.2 HYPOTENSION DUE TO DRUGS: ICD-10-CM

## 2020-10-16 DIAGNOSIS — Z66 DO NOT RESUSCITATE: ICD-10-CM

## 2020-10-16 DIAGNOSIS — F32.9 MAJOR DEPRESSIVE DISORDER, SINGLE EPISODE, UNSPECIFIED: ICD-10-CM

## 2020-10-16 DIAGNOSIS — D62 ACUTE POSTHEMORRHAGIC ANEMIA: ICD-10-CM

## 2020-10-16 DIAGNOSIS — E87.5 HYPERKALEMIA: ICD-10-CM

## 2020-10-16 DIAGNOSIS — T40.605A ADVERSE EFFECT OF UNSPECIFIED NARCOTICS, INITIAL ENCOUNTER: ICD-10-CM

## 2020-10-16 DIAGNOSIS — Z88.6 ALLERGY STATUS TO ANALGESIC AGENT: ICD-10-CM

## 2020-10-16 DIAGNOSIS — M17.11 UNILATERAL PRIMARY OSTEOARTHRITIS, RIGHT KNEE: ICD-10-CM

## 2020-10-16 DIAGNOSIS — N17.9 ACUTE KIDNEY FAILURE, UNSPECIFIED: ICD-10-CM

## 2020-10-16 DIAGNOSIS — S42.302A UNSPECIFIED FRACTURE OF SHAFT OF HUMERUS, LEFT ARM, INITIAL ENCOUNTER FOR CLOSED FRACTURE: ICD-10-CM

## 2020-10-16 DIAGNOSIS — Z88.2 ALLERGY STATUS TO SULFONAMIDES: ICD-10-CM

## 2020-10-16 DIAGNOSIS — E03.9 HYPOTHYROIDISM, UNSPECIFIED: ICD-10-CM

## 2021-07-15 VITALS
TEMPERATURE: 98 F | OXYGEN SATURATION: 97 % | DIASTOLIC BLOOD PRESSURE: 62 MMHG | HEIGHT: 64 IN | SYSTOLIC BLOOD PRESSURE: 106 MMHG | WEIGHT: 130.07 LBS | RESPIRATION RATE: 16 BRPM | HEART RATE: 87 BPM

## 2021-07-15 PROBLEM — I10 ESSENTIAL (PRIMARY) HYPERTENSION: Chronic | Status: ACTIVE | Noted: 2020-10-09

## 2021-07-15 PROBLEM — M19.90 UNSPECIFIED OSTEOARTHRITIS, UNSPECIFIED SITE: Chronic | Status: ACTIVE | Noted: 2020-10-09

## 2021-07-15 LAB
ADD ON TEST-SPECIMEN IN LAB: SIGNIFICANT CHANGE UP
ALBUMIN SERPL ELPH-MCNC: 3.6 G/DL — SIGNIFICANT CHANGE UP (ref 3.3–5)
ALP SERPL-CCNC: 91 U/L — SIGNIFICANT CHANGE UP (ref 40–120)
ALT FLD-CCNC: 18 U/L — SIGNIFICANT CHANGE UP (ref 12–78)
ANION GAP SERPL CALC-SCNC: 7 MMOL/L — SIGNIFICANT CHANGE UP (ref 5–17)
APPEARANCE UR: CLEAR — SIGNIFICANT CHANGE UP
APTT BLD: 31.1 SEC — SIGNIFICANT CHANGE UP (ref 27.5–35.5)
AST SERPL-CCNC: 11 U/L — LOW (ref 15–37)
BASOPHILS # BLD AUTO: 0.06 K/UL — SIGNIFICANT CHANGE UP (ref 0–0.2)
BASOPHILS NFR BLD AUTO: 0.7 % — SIGNIFICANT CHANGE UP (ref 0–2)
BILIRUB SERPL-MCNC: 0.5 MG/DL — SIGNIFICANT CHANGE UP (ref 0.2–1.2)
BILIRUB UR-MCNC: NEGATIVE — SIGNIFICANT CHANGE UP
BUN SERPL-MCNC: 45 MG/DL — HIGH (ref 7–23)
CALCIUM SERPL-MCNC: 9.6 MG/DL — SIGNIFICANT CHANGE UP (ref 8.5–10.1)
CHLORIDE SERPL-SCNC: 105 MMOL/L — SIGNIFICANT CHANGE UP (ref 96–108)
CO2 SERPL-SCNC: 26 MMOL/L — SIGNIFICANT CHANGE UP (ref 22–31)
COLOR SPEC: YELLOW — SIGNIFICANT CHANGE UP
CREAT SERPL-MCNC: 1.57 MG/DL — HIGH (ref 0.5–1.3)
DIFF PNL FLD: NEGATIVE — SIGNIFICANT CHANGE UP
EOSINOPHIL # BLD AUTO: 0.12 K/UL — SIGNIFICANT CHANGE UP (ref 0–0.5)
EOSINOPHIL NFR BLD AUTO: 1.5 % — SIGNIFICANT CHANGE UP (ref 0–6)
GLUCOSE SERPL-MCNC: 118 MG/DL — HIGH (ref 70–99)
GLUCOSE UR QL: NEGATIVE MG/DL — SIGNIFICANT CHANGE UP
HCT VFR BLD CALC: 39.9 % — SIGNIFICANT CHANGE UP (ref 34.5–45)
HGB BLD-MCNC: 13 G/DL — SIGNIFICANT CHANGE UP (ref 11.5–15.5)
IMM GRANULOCYTES NFR BLD AUTO: 0.2 % — SIGNIFICANT CHANGE UP (ref 0–1.5)
INR BLD: 1.02 RATIO — SIGNIFICANT CHANGE UP (ref 0.88–1.16)
KETONES UR-MCNC: NEGATIVE — SIGNIFICANT CHANGE UP
LEUKOCYTE ESTERASE UR-ACNC: ABNORMAL
LYMPHOCYTES # BLD AUTO: 1.07 K/UL — SIGNIFICANT CHANGE UP (ref 1–3.3)
LYMPHOCYTES # BLD AUTO: 13 % — SIGNIFICANT CHANGE UP (ref 13–44)
MCHC RBC-ENTMCNC: 29.7 PG — SIGNIFICANT CHANGE UP (ref 27–34)
MCHC RBC-ENTMCNC: 32.6 GM/DL — SIGNIFICANT CHANGE UP (ref 32–36)
MCV RBC AUTO: 91.3 FL — SIGNIFICANT CHANGE UP (ref 80–100)
MONOCYTES # BLD AUTO: 0.56 K/UL — SIGNIFICANT CHANGE UP (ref 0–0.9)
MONOCYTES NFR BLD AUTO: 6.8 % — SIGNIFICANT CHANGE UP (ref 2–14)
NEUTROPHILS # BLD AUTO: 6.4 K/UL — SIGNIFICANT CHANGE UP (ref 1.8–7.4)
NEUTROPHILS NFR BLD AUTO: 77.8 % — HIGH (ref 43–77)
NITRITE UR-MCNC: NEGATIVE — SIGNIFICANT CHANGE UP
PH UR: 5 — SIGNIFICANT CHANGE UP (ref 5–8)
PLATELET # BLD AUTO: 282 K/UL — SIGNIFICANT CHANGE UP (ref 150–400)
POTASSIUM SERPL-MCNC: 4.9 MMOL/L — SIGNIFICANT CHANGE UP (ref 3.5–5.3)
POTASSIUM SERPL-SCNC: 4.9 MMOL/L — SIGNIFICANT CHANGE UP (ref 3.5–5.3)
PROT SERPL-MCNC: 6.8 GM/DL — SIGNIFICANT CHANGE UP (ref 6–8.3)
PROT UR-MCNC: NEGATIVE MG/DL — SIGNIFICANT CHANGE UP
PROTHROM AB SERPL-ACNC: 11.9 SEC — SIGNIFICANT CHANGE UP (ref 10.6–13.6)
RBC # BLD: 4.37 M/UL — SIGNIFICANT CHANGE UP (ref 3.8–5.2)
RBC # FLD: 12.7 % — SIGNIFICANT CHANGE UP (ref 10.3–14.5)
SODIUM SERPL-SCNC: 138 MMOL/L — SIGNIFICANT CHANGE UP (ref 135–145)
SP GR SPEC: 1.01 — SIGNIFICANT CHANGE UP (ref 1.01–1.02)
UROBILINOGEN FLD QL: NEGATIVE MG/DL — SIGNIFICANT CHANGE UP
WBC # BLD: 8.23 K/UL — SIGNIFICANT CHANGE UP (ref 3.8–10.5)
WBC # FLD AUTO: 8.23 K/UL — SIGNIFICANT CHANGE UP (ref 3.8–10.5)

## 2021-07-15 RX ORDER — ACETAMINOPHEN 500 MG
650 TABLET ORAL ONCE
Refills: 0 | Status: COMPLETED | OUTPATIENT
Start: 2021-07-15 | End: 2021-07-15

## 2021-07-15 RX ORDER — SODIUM CHLORIDE 9 MG/ML
500 INJECTION INTRAMUSCULAR; INTRAVENOUS; SUBCUTANEOUS ONCE
Refills: 0 | Status: COMPLETED | OUTPATIENT
Start: 2021-07-15 | End: 2021-07-15

## 2021-07-15 RX ORDER — THYROID 120 MG
1 TABLET ORAL
Qty: 0 | Refills: 0 | DISCHARGE

## 2021-07-15 RX ADMIN — Medication 650 MILLIGRAM(S): at 17:00

## 2021-07-15 RX ADMIN — SODIUM CHLORIDE 500 MILLILITER(S): 9 INJECTION INTRAMUSCULAR; INTRAVENOUS; SUBCUTANEOUS at 13:13

## 2021-07-15 RX ADMIN — SODIUM CHLORIDE 500 MILLILITER(S): 9 INJECTION INTRAMUSCULAR; INTRAVENOUS; SUBCUTANEOUS at 14:54

## 2021-07-15 RX ADMIN — Medication 650 MILLIGRAM(S): at 16:27

## 2021-07-15 RX ADMIN — SODIUM CHLORIDE 500 MILLILITER(S): 9 INJECTION INTRAMUSCULAR; INTRAVENOUS; SUBCUTANEOUS at 14:07

## 2021-07-15 RX ADMIN — SODIUM CHLORIDE 500 MILLILITER(S): 9 INJECTION INTRAMUSCULAR; INTRAVENOUS; SUBCUTANEOUS at 14:00

## 2021-07-15 NOTE — ED PROVIDER NOTE - PHYSICAL EXAMINATION
Constitutional: Elderly female in NAD AAOx3  Eyes: PERRLA EOMI  Head: Normocephalic atraumatic  Mouth: MMM  Cardiac: regular rate   Resp: Lungs CTAB  GI: Abd s/nt/nd, no rebound or guarding.  Neuro: awake, alert, moving all extremities, cranial nerves 2-12 intact, sensation intact, no dysmetria.  Skin: No rashes   MSK:  Knee TTP, no erythema  or warmth Constitutional: Elderly female in NAD AAOx3  Eyes: PERRLA EOMI  Head: Normocephalic atraumatic  Mouth: MMM  Cardiac: regular rate   Resp: Lungs CTAB  GI: Abd s/nt/nd, no rebound or guarding.  Neuro: awake, alert, moving all extremities, cranial nerves 2-12 intact, sensation intact, no dysmetria.  Skin: No rashes   MSK: right Knee TTP, no erythema  or warmth

## 2021-07-15 NOTE — ED ADULT TRIAGE NOTE - CHIEF COMPLAINT QUOTE
pt. c/o generalized weakness x days, pt. is A&ox3, pt. forgetful to month. pt. sent from PCP [facility] office for hypotension, as per EMS pt. had normotensive BPs for duration of transport.

## 2021-07-15 NOTE — ED ADULT NURSE NOTE - CHIEF COMPLAINT QUOTE
pt. c/o generalized weakness x days, pt. is A&ox3, pt. forgetful to month. pt. sent from PCP [facility] office for hypotension, as per EMS pt. had normotensive BPs for duration of transport.

## 2021-07-15 NOTE — ED PROVIDER NOTE - PROGRESS NOTE DETAILS
will give ambulation trial, pt feels well. discussed findings with pts son, he will have his brother come pick her up. ED evaluation and management discussed with the patient and family (if available) in detail.  Close PMD follow up encouraged.  Strict ED return instructions discussed in detail and patient given the opportunity to ask any questions about their discharge diagnosis and instructions. Patient verbalized understanding.    pt and son told about darien and need to drink more fluids and f/u with pcp Gus Rock MD :discharged prior to my arrival, however pt failed ambulation trial, will add on cardiacs to initial labs and admit for weakness, unable to ambulate.

## 2021-07-15 NOTE — ED PROVIDER NOTE - NSFOLLOWUPCLINICS_GEN_ALL_ED_FT
[facility]  Family Medicine  [address] [place] [address]  Phone: [phone]  Fax:

## 2021-07-15 NOTE — ED PROVIDER NOTE - PATIENT PORTAL LINK FT
You can access the FollowMyHealth Patient Portal offered by [facility] by registering at the following website: [email or web address]. By joining [employer]’s FollowMyHealth portal, you will also be able to view your health information using other applications (apps) compatible with our system.

## 2021-07-15 NOTE — ED PROVIDER NOTE - CLINICAL SUMMARY MEDICAL DECISION MAKING FREE TEXT BOX
88 y/o female with PMHx of HTN, OA, hypothyroidism  went for 'gel shot' in knee and was hypotensive. Pt states she has not been drinking water. Blood work mild  acute kidney  injury get fluid check CXR, UA. Believes it is not sepsis or Afib .No other complaints 88 y/o female with PMHx of HTN, OA, hypothyroidism  went for 'gel shot' in knee and was found to be hypotensive. Pt states she has not been drinking water as she prefers snapple. Blood work shows mild  acute kidney  injury, will give ivf, check CXR, UA. Patient is afebrile, not hypotensive and not septic, will give ivf, ambulate and dc home

## 2021-07-15 NOTE — ED ADULT NURSE NOTE - OBJECTIVE STATEMENT
89 y/o F presents to the ED from doctor's office for hypotension. Pt normaltensive in department. Pt states she was at the doctors office for knee injection for pain and was found to be hypotensive. Pt denies any chest pain or sob.

## 2021-07-15 NOTE — ED PROVIDER NOTE - OBJECTIVE STATEMENT
88 y/o female with a PMHx of HTN,OA, hypothyroidism presents to the ED c/o weakness x days. Pt was sent from PCP at [facility] office for hypotension. Pt was sent from PCP [facility] office for hypotension, as per EMS pt had normotensive BP for duration of transport. Pt went to PCP with aid. States she has not been drinking water. +coughing. Denies pain, fever. 88 y/o female with a PMHx of HTN,OA, hypothyroidism presents to the ED c/o weakness. Pt was sent from PCP at [facility] office for shot in her knee and was sent to the ed as she was found to be hypotensive. Pt was sent from PCP [facility] office for hypotension, as per EMS pt had normotensive BP for duration of transport. Pt went to PCP with her aid. States she has not been drinking much water. +coughing. Denies pain, fever.

## 2021-07-15 NOTE — ED PROVIDER NOTE - NS ED ROS FT
Constitutional: No fever or chills  Eyes: No visual changes  HEENT: No throat pain  CV: No chest pain  Resp: No SOB no cough  GI: No abd pain, nausea or vomiting. +cough  : No dysuria  MSK: No musculoskeletal pain. +weakness  Skin: No rash  Neuro: No headache

## 2021-07-16 ENCOUNTER — TRANSCRIPTION ENCOUNTER (OUTPATIENT)
Age: 86
End: 2021-07-16

## 2021-07-16 LAB
ANION GAP SERPL CALC-SCNC: 4 MMOL/L — LOW (ref 5–17)
ANION GAP SERPL CALC-SCNC: 7 MMOL/L — SIGNIFICANT CHANGE UP (ref 5–17)
BUN SERPL-MCNC: 37 MG/DL — HIGH (ref 7–23)
BUN SERPL-MCNC: 37 MG/DL — HIGH (ref 7–23)
CALCIUM SERPL-MCNC: 8.5 MG/DL — SIGNIFICANT CHANGE UP (ref 8.5–10.1)
CALCIUM SERPL-MCNC: 9 MG/DL — SIGNIFICANT CHANGE UP (ref 8.5–10.1)
CHLORIDE SERPL-SCNC: 111 MMOL/L — HIGH (ref 96–108)
CHLORIDE SERPL-SCNC: 112 MMOL/L — HIGH (ref 96–108)
CO2 SERPL-SCNC: 23 MMOL/L — SIGNIFICANT CHANGE UP (ref 22–31)
CO2 SERPL-SCNC: 23 MMOL/L — SIGNIFICANT CHANGE UP (ref 22–31)
CREAT SERPL-MCNC: 1.17 MG/DL — SIGNIFICANT CHANGE UP (ref 0.5–1.3)
CREAT SERPL-MCNC: 1.24 MG/DL — SIGNIFICANT CHANGE UP (ref 0.5–1.3)
CULTURE RESULTS: SIGNIFICANT CHANGE UP
GLUCOSE SERPL-MCNC: 102 MG/DL — HIGH (ref 70–99)
GLUCOSE SERPL-MCNC: 83 MG/DL — SIGNIFICANT CHANGE UP (ref 70–99)
HCT VFR BLD CALC: 33.8 % — LOW (ref 34.5–45)
HCT VFR BLD CALC: 35.4 % — SIGNIFICANT CHANGE UP (ref 34.5–45)
HGB BLD-MCNC: 10.8 G/DL — LOW (ref 11.5–15.5)
HGB BLD-MCNC: 11.7 G/DL — SIGNIFICANT CHANGE UP (ref 11.5–15.5)
MAGNESIUM SERPL-MCNC: 1.7 MG/DL — SIGNIFICANT CHANGE UP (ref 1.6–2.6)
MAGNESIUM SERPL-MCNC: 1.9 MG/DL — SIGNIFICANT CHANGE UP (ref 1.6–2.6)
MCHC RBC-ENTMCNC: 29.8 PG — SIGNIFICANT CHANGE UP (ref 27–34)
MCHC RBC-ENTMCNC: 30.2 PG — SIGNIFICANT CHANGE UP (ref 27–34)
MCHC RBC-ENTMCNC: 32 GM/DL — SIGNIFICANT CHANGE UP (ref 32–36)
MCHC RBC-ENTMCNC: 33.1 GM/DL — SIGNIFICANT CHANGE UP (ref 32–36)
MCV RBC AUTO: 91.2 FL — SIGNIFICANT CHANGE UP (ref 80–100)
MCV RBC AUTO: 93.1 FL — SIGNIFICANT CHANGE UP (ref 80–100)
PHOSPHATE SERPL-MCNC: 2.9 MG/DL — SIGNIFICANT CHANGE UP (ref 2.5–4.5)
PHOSPHATE SERPL-MCNC: 3.7 MG/DL — SIGNIFICANT CHANGE UP (ref 2.5–4.5)
PLATELET # BLD AUTO: 224 K/UL — SIGNIFICANT CHANGE UP (ref 150–400)
PLATELET # BLD AUTO: 255 K/UL — SIGNIFICANT CHANGE UP (ref 150–400)
POTASSIUM SERPL-MCNC: 4.6 MMOL/L — SIGNIFICANT CHANGE UP (ref 3.5–5.3)
POTASSIUM SERPL-MCNC: 5 MMOL/L — SIGNIFICANT CHANGE UP (ref 3.5–5.3)
POTASSIUM SERPL-SCNC: 4.6 MMOL/L — SIGNIFICANT CHANGE UP (ref 3.5–5.3)
POTASSIUM SERPL-SCNC: 5 MMOL/L — SIGNIFICANT CHANGE UP (ref 3.5–5.3)
RBC # BLD: 3.63 M/UL — LOW (ref 3.8–5.2)
RBC # BLD: 3.88 M/UL — SIGNIFICANT CHANGE UP (ref 3.8–5.2)
RBC # FLD: 12.6 % — SIGNIFICANT CHANGE UP (ref 10.3–14.5)
RBC # FLD: 12.9 % — SIGNIFICANT CHANGE UP (ref 10.3–14.5)
SODIUM SERPL-SCNC: 139 MMOL/L — SIGNIFICANT CHANGE UP (ref 135–145)
SODIUM SERPL-SCNC: 141 MMOL/L — SIGNIFICANT CHANGE UP (ref 135–145)
SPECIMEN SOURCE: SIGNIFICANT CHANGE UP
TROPONIN I SERPL-MCNC: <0.015 NG/ML — SIGNIFICANT CHANGE UP (ref 0.01–0.04)
WBC # BLD: 5.35 K/UL — SIGNIFICANT CHANGE UP (ref 3.8–10.5)
WBC # BLD: 6.69 K/UL — SIGNIFICANT CHANGE UP (ref 3.8–10.5)
WBC # FLD AUTO: 5.35 K/UL — SIGNIFICANT CHANGE UP (ref 3.8–10.5)
WBC # FLD AUTO: 6.69 K/UL — SIGNIFICANT CHANGE UP (ref 3.8–10.5)

## 2021-07-16 RX ORDER — SODIUM CHLORIDE 9 MG/ML
1000 INJECTION INTRAMUSCULAR; INTRAVENOUS; SUBCUTANEOUS
Refills: 0 | Status: DISCONTINUED | OUTPATIENT
Start: 2021-07-16 | End: 2021-07-19

## 2021-07-16 RX ORDER — SPIRONOLACTONE 25 MG/1
25 TABLET, FILM COATED ORAL DAILY
Refills: 0 | Status: DISCONTINUED | OUTPATIENT
Start: 2021-07-16 | End: 2021-07-19

## 2021-07-16 RX ORDER — ACETAMINOPHEN 500 MG
650 TABLET ORAL ONCE
Refills: 0 | Status: COMPLETED | OUTPATIENT
Start: 2021-07-16 | End: 2021-07-16

## 2021-07-16 RX ORDER — LEVOTHYROXINE SODIUM 125 MCG
125 TABLET ORAL DAILY
Refills: 0 | Status: DISCONTINUED | OUTPATIENT
Start: 2021-07-16 | End: 2021-07-19

## 2021-07-16 RX ORDER — ATORVASTATIN CALCIUM 80 MG/1
10 TABLET, FILM COATED ORAL AT BEDTIME
Refills: 0 | Status: DISCONTINUED | OUTPATIENT
Start: 2021-07-16 | End: 2021-07-19

## 2021-07-16 RX ORDER — LIDOCAINE 4 G/100G
1 CREAM TOPICAL DAILY
Refills: 0 | Status: DISCONTINUED | OUTPATIENT
Start: 2021-07-16 | End: 2021-07-19

## 2021-07-16 RX ORDER — ACETAMINOPHEN 500 MG
650 TABLET ORAL EVERY 6 HOURS
Refills: 0 | Status: DISCONTINUED | OUTPATIENT
Start: 2021-07-16 | End: 2021-07-19

## 2021-07-16 RX ORDER — HEPARIN SODIUM 5000 [USP'U]/ML
5000 INJECTION INTRAVENOUS; SUBCUTANEOUS EVERY 12 HOURS
Refills: 0 | Status: DISCONTINUED | OUTPATIENT
Start: 2021-07-16 | End: 2021-07-19

## 2021-07-16 RX ORDER — SENNA PLUS 8.6 MG/1
2 TABLET ORAL AT BEDTIME
Refills: 0 | Status: DISCONTINUED | OUTPATIENT
Start: 2021-07-16 | End: 2021-07-19

## 2021-07-16 RX ORDER — CHOLECALCIFEROL (VITAMIN D3) 125 MCG
1000 CAPSULE ORAL DAILY
Refills: 0 | Status: DISCONTINUED | OUTPATIENT
Start: 2021-07-16 | End: 2021-07-19

## 2021-07-16 RX ORDER — BUPROPION HYDROCHLORIDE 150 MG/1
1 TABLET, EXTENDED RELEASE ORAL
Qty: 0 | Refills: 0 | DISCHARGE

## 2021-07-16 RX ORDER — ACETAMINOPHEN 500 MG
650 TABLET ORAL EVERY 6 HOURS
Refills: 0 | Status: DISCONTINUED | OUTPATIENT
Start: 2021-07-16 | End: 2021-07-16

## 2021-07-16 RX ORDER — BUPROPION HYDROCHLORIDE 150 MG/1
300 TABLET, EXTENDED RELEASE ORAL DAILY
Refills: 0 | Status: DISCONTINUED | OUTPATIENT
Start: 2021-07-16 | End: 2021-07-19

## 2021-07-16 RX ORDER — ASPIRIN/CALCIUM CARB/MAGNESIUM 324 MG
81 TABLET ORAL DAILY
Refills: 0 | Status: DISCONTINUED | OUTPATIENT
Start: 2021-07-16 | End: 2021-07-19

## 2021-07-16 RX ORDER — LANOLIN ALCOHOL/MO/W.PET/CERES
3 CREAM (GRAM) TOPICAL AT BEDTIME
Refills: 0 | Status: DISCONTINUED | OUTPATIENT
Start: 2021-07-16 | End: 2021-07-19

## 2021-07-16 RX ORDER — KETOROLAC TROMETHAMINE 30 MG/ML
15 SYRINGE (ML) INJECTION ONCE
Refills: 0 | Status: DISCONTINUED | OUTPATIENT
Start: 2021-07-16 | End: 2021-07-16

## 2021-07-16 RX ADMIN — SPIRONOLACTONE 25 MILLIGRAM(S): 25 TABLET, FILM COATED ORAL at 10:16

## 2021-07-16 RX ADMIN — SODIUM CHLORIDE 100 MILLILITER(S): 9 INJECTION INTRAMUSCULAR; INTRAVENOUS; SUBCUTANEOUS at 02:48

## 2021-07-16 RX ADMIN — Medication 260 MILLIGRAM(S): at 04:03

## 2021-07-16 RX ADMIN — LIDOCAINE 1 PATCH: 4 CREAM TOPICAL at 07:18

## 2021-07-16 RX ADMIN — Medication 650 MILLIGRAM(S): at 01:47

## 2021-07-16 RX ADMIN — BUPROPION HYDROCHLORIDE 300 MILLIGRAM(S): 150 TABLET, EXTENDED RELEASE ORAL at 10:16

## 2021-07-16 RX ADMIN — Medication 1000 UNIT(S): at 10:16

## 2021-07-16 RX ADMIN — Medication 650 MILLIGRAM(S): at 04:30

## 2021-07-16 RX ADMIN — Medication 15 MILLIGRAM(S): at 06:10

## 2021-07-16 RX ADMIN — Medication 3 MILLIGRAM(S): at 21:40

## 2021-07-16 RX ADMIN — LIDOCAINE 1 PATCH: 4 CREAM TOPICAL at 19:00

## 2021-07-16 RX ADMIN — Medication 650 MILLIGRAM(S): at 21:39

## 2021-07-16 RX ADMIN — HEPARIN SODIUM 5000 UNIT(S): 5000 INJECTION INTRAVENOUS; SUBCUTANEOUS at 21:40

## 2021-07-16 RX ADMIN — ATORVASTATIN CALCIUM 10 MILLIGRAM(S): 80 TABLET, FILM COATED ORAL at 21:40

## 2021-07-16 RX ADMIN — LIDOCAINE 1 PATCH: 4 CREAM TOPICAL at 10:16

## 2021-07-16 RX ADMIN — Medication 650 MILLIGRAM(S): at 22:38

## 2021-07-16 RX ADMIN — HEPARIN SODIUM 5000 UNIT(S): 5000 INJECTION INTRAVENOUS; SUBCUTANEOUS at 10:17

## 2021-07-16 RX ADMIN — LIDOCAINE 1 PATCH: 4 CREAM TOPICAL at 02:32

## 2021-07-16 RX ADMIN — Medication 15 MILLIGRAM(S): at 05:52

## 2021-07-16 RX ADMIN — LIDOCAINE 1 PATCH: 4 CREAM TOPICAL at 10:19

## 2021-07-16 RX ADMIN — Medication 650 MILLIGRAM(S): at 02:45

## 2021-07-16 RX ADMIN — Medication 81 MILLIGRAM(S): at 10:16

## 2021-07-16 RX ADMIN — Medication 125 MICROGRAM(S): at 05:52

## 2021-07-16 RX ADMIN — LIDOCAINE 1 PATCH: 4 CREAM TOPICAL at 21:17

## 2021-07-16 NOTE — CONSULT NOTE ADULT - SUBJECTIVE AND OBJECTIVE BOX
CHIEF COMPLAINT: Severe knee pain    HPI:  89 year old woman with a history of HTN, hypothyroidism, right knee OA, multiple falls, who presented to the ER on 7/15 for the evaluation of hypotension that was noted in the outpatient setting during a visit with her orthopedic surgeon.  She was reportedly hypotensive and plans to perform a R knee joint infection was subsequently cancelled.  However, Bp was reportedly normal when she was seen by EMS; similarly normal (106/62) in the ED.  She describes mild generalized weakness but her main complaint this AM is severe knee pain (unable to identify exacerbating or alleviating factors).  She denies past history of heart disease.    PAST MEDICAL & SURGICAL HISTORY:  Hypothyroid  Osteoarthritis, localized Right knee  Hypertension  History of appendectomy  History of cholecystectomy    SOCIAL HISTORY:   Alcohol: Occasional wine  Smoking: Nonsmoker    FAMILY HISTORY: Patient unable to recall details    MEDICATIONS  (STANDING):  aspirin  chewable 81 milliGRAM(s) Oral daily  atorvastatin 10 milliGRAM(s) Oral at bedtime  buPROPion XL (24-Hour) . 300 milliGRAM(s) Oral daily  cholecalciferol 1000 Unit(s) Oral daily  heparin   Injectable 5000 Unit(s) SubCutaneous every 12 hours  levothyroxine 125 MICROGram(s) Oral daily  lidocaine   Patch 1 Patch Transdermal daily  sodium chloride 0.9%. 1000 milliLiter(s) (100 mL/Hr) IV Continuous <Continuous>  spironolactone 25 milliGRAM(s) Oral daily    MEDICATIONS  (PRN):  acetaminophen   Tablet .. 650 milliGRAM(s) Oral every 6 hours PRN Mild Pain (1 - 3)  melatonin 3 milliGRAM(s) Oral at bedtime PRN Insomnia  senna 2 Tablet(s) Oral at bedtime PRN Constipation    Allergies:   oxycodone (Hives; Flushing)  sulfa drugs (Other)    REVIEW OF SYSTEMS:  CONSTITUTIONAL: No fevers or chills  Eyes: No visual changes  NECK: No pain or stiffness  RESPIRATORY: No cough, wheezing, hemoptysis; No shortness of breath  CARDIOVASCULAR: No chest pain or palpitations  GASTROINTESTINAL: No abdominal pain. No nausea, vomiting  GENITOURINARY: No dysuria, frequency or hematuria  NEUROLOGICAL: No numbness.  SKIN: No itching or rash  MSK:  Knee pain (R>L)  All other review of systems is negative unless indicated above    VITAL SIGNS:   T(C): 36.1 (16 Jul 2021 01:00), Max: 36.8 (15 Jul 2021 12:33)  T(F): 97 (16 Jul 2021 01:00), Max: 98.2 (15 Jul 2021 12:33)  HR: 95 (16 Jul 2021 01:00) (67 - 95)  BP: 116/50 (16 Jul 2021 01:00) (103/60 - 116/50)  BP(mean): 71 (16 Jul 2021 00:40) (65 - 86)  RR: 18 (16 Jul 2021 01:00) (15 - 20)  SpO2: 97% (16 Jul 2021 01:00) (95% - 97%)    PHYSICAL EXAM:  Constitutional: NAD, awake and alert, appears stated age  HEENT:  EOMI,  Pupils round, No oral cyanosis.  Pulmonary: Non-labored, breath sounds are clear bilaterally, No wheezing, rales or rhonchi  Cardiovascular: S1 and S2, regular rate and rhythm  Gastrointestinal: Bowel Sounds present, soft, nontender.   Lymph: No edema. No cervical lymphadenopathy.  Neurological: Alert, no focal deficits  Skin: No rashes.  Psych:  Mood & affect appropriate    LABS:                     13.0   8.23  )-----------( 282      ( 15 Jul 2021 13:12 )             39.9     138    |  105    |  45     ----------------------------<  118    4.9     |  26     |  1.57     Ca    9.6        15 Jul 2021 13:12  TPro  6.8    /  Alb  3.6    /  TBili  0.5    /  DBili  x      /  AST  11     /  ALT  18     /  AlkPhos  91     15 Jul 2021 13:12    PT/INR - ( 15 Jul 2021 13:12 )   PT: 11.9 sec;   INR: 1.02 ratio    PTT - ( 15 Jul 2021 13:12 )  PTT:31.1 sec    CARDIAC MARKERS ( 16 Jul 2021 02:44 ) <0.015 ng/mL / x     / x     / x     / x      CARDIAC MARKERS ( 15 Jul 2021 13:12 ) <0.015 ng/mL / x     / x     / x     / x      12 Lead ECG (07.15.21 @ 13:56):  Sinus rhythm with Premature atrial complexes  Nonspecific ST and T wave abnormality    Tele:  Sinus, PACs

## 2021-07-16 NOTE — ED ADULT NURSE REASSESSMENT NOTE - NS ED NURSE REASSESS COMMENT FT1
Pt resting in bed. VSS. Monitors in place. No acute distress noted at this time. Pt is ready for admission.

## 2021-07-16 NOTE — CONSULT NOTE ADULT - SUBJECTIVE AND OBJECTIVE BOX
89 HTN, hypothyroidism, right knee OA, multiple falls, who presented to the ER on 7/15 for the evaluation of hypotension that was noted in the outpatient setting during a visit with her orthopedic surgeon, renal evaluation of ANDREZ.  She was reportedly hypotensive but Bp was reportedly normal when she was seen by EMS; similarly normal (106/62) in the ED.  She describes mild generalized weakness , no dizziness or lh or diarrhea now. Got IVF in ed, on ns at 100/hr currently.    PAST MEDICAL & SURGICAL HISTORY:  Hypothyroid    Osteoarthritis, localized  Right knee    Hypertension    History of appendectomy    History of cholecystectomy        MEDICATIONS  (STANDING):  aspirin  chewable 81 milliGRAM(s) Oral daily  atorvastatin 10 milliGRAM(s) Oral at bedtime  buPROPion XL (24-Hour) . 300 milliGRAM(s) Oral daily  cholecalciferol 1000 Unit(s) Oral daily  heparin   Injectable 5000 Unit(s) SubCutaneous every 12 hours  levothyroxine 125 MICROGram(s) Oral daily  lidocaine   Patch 1 Patch Transdermal daily  sodium chloride 0.9%. 1000 milliLiter(s) (100 mL/Hr) IV Continuous <Continuous>  sodium chloride 0.9%. 1000 milliLiter(s) (100 mL/Hr) IV Continuous <Continuous>  spironolactone 25 milliGRAM(s) Oral daily    MEDICATIONS  (PRN):  acetaminophen   Tablet .. 650 milliGRAM(s) Oral every 6 hours PRN Mild Pain (1 - 3)  melatonin 3 milliGRAM(s) Oral at bedtime PRN Insomnia  senna 2 Tablet(s) Oral at bedtime PRN Constipation      Allergies    oxycodone (Hives; Flushing)  sulfa drugs (Other)    Intolerances        SOCIAL HISTORY:    no etoh/cigg    FAMILY HISTORY:  Patient unable to provide medical history (Father, Mother)  patient cannot recall family history at this time, states &quot;in too much pain&quot;        REVIEW OF SYSTEMS:    CONSTITUTIONAL: stable weakness, no fevers or chills  EYES/ENT: No visual changes;  No vertigo or throat pain   NECK: No pain or stiffness  RESPIRATORY: No cough, wheezing, hemoptysis; No shortness of breath  CARDIOVASCULAR: No chest pain or palpitations  GASTROINTESTINAL: No abdominal or epigastric pain. No nausea, vomiting, or hematemesis; No diarrhea or constipation. No melena or hematochezia.  GENITOURINARY: No dysuria, frequency or hematuria  NEUROLOGICAL: No numbness or weakness  SKIN: No itching, burning, rashes, or lesions   All other review of systems is negative unless indicated above.      T(C): , Max: 36.6 (07-15-21 @ 21:53)  T(F): , Max: 97.9 (07-15-21 @ 21:53)  HR: 77 (21 @ 07:04)  BP: 116/46 (21 @ 07:04)  BP(mean): 71 (21 @ 00:40)  RR: 19 (21 @ 07:04)  SpO2: 97% (21 @ 07:04)  Wt(kg): --      Weight (kg): 59 ( @ 01:00)    PHYSICAL EXAM:    Constitutional: frail, cachecitc  HEENT: PERRLA, EOMI,  MMM  Neck: No LAD, No JVD  Respiratory: dist  Cardiovascular: S1 and S2  Extremities: No peripheral edema  Neurological: A/O x 3  : No Robert  Skin: No rashes  Access: Not applicable        LABS:                        11.7   6.69  )-----------( 255      ( 2021 07:46 )             35.4     2021 07:46    141    |  111    |  37     ----------------------------<  83     4.6     |  23     |  1.24   15 Jul 2021 13:12    138    |  105    |  45     ----------------------------<  118    4.9     |  26     |  1.57     Ca    9.0        2021 07:46  Ca    9.6        15 Jul 2021 13:12  Phos  2.9       2021 07:46  Mg     1.9       2021 07:46    TPro  6.8    /  Alb  3.6    /  TBili  0.5    /  DBili  x      /  AST  11     /  ALT  18     /  AlkPhos  91     15 Jul 2021 13:12          Urine Studies:  Urinalysis Basic - ( 15 Jul 2021 14:06 )    Color: Yellow / Appearance: Clear / S.010 / pH: x  Gluc: x / Ketone: Negative  / Bili: Negative / Urobili: Negative mg/dL   Blood: x / Protein: Negative mg/dL / Nitrite: Negative   Leuk Esterase: Trace / RBC: 3-5 /HPF / WBC 3-5   Sq Epi: x / Non Sq Epi: Few / Bacteria: Few            RADIOLOGY & ADDITIONAL STUDIES:

## 2021-07-16 NOTE — CONSULT NOTE ADULT - ASSESSMENT
89 HTN, hypothyroidism, right knee OA, multiple falls, who presented to the ER on 7/15 for the evaluation of hypotension that was noted in the outpatient setting during a visit with her orthopedic surgeon, renal evaluation of ANDREZ.     ANDREZ  -likely mild pre-renal state in the setting of poor intake, chronic diuretic use (ald/lasix/ace)  -Agree with hold lasix for now, aldactone still ongoing (ok to maintain )  -can d/c ivf if taking adequate po as patient on outpatient CHF regimen  -Optimize intake  -Will slowly look to reintroduce other cardiac meds, possibly lasix at qod  -NSAID avoidance, optimzie intake    [facility]  -Tele  -EF?  -Ace/lasix on hold    thanks, will follow  d/c with staff and team 89 HTN, hypothyroidism, right knee OA, multiple falls, who presented to the ER on 7/15 for the evaluation of hypotension that was noted in the outpatient setting during a visit with her orthopedic surgeon, renal evaluation of ANDREZ.     ANDREZ  -likely mild pre-renal state in the setting of poor intake, chronic diuretic use (ald/lasix/ace)  -Agree with hold lasix for now, aldactone still ongoing (ok to maintain )  -can d/c ivf if taking adequate po as patient on outpatient CHF regimen  -Optimize intake  -Will slowly look to reintroduce other cardiac meds, possibly lasix at qod  -NSAID avoidance, optimzie intake    [facility]  -Tele  -EF?  -Ace/lasix on hold    Will only follow as needed, call with any new/acute issues

## 2021-07-16 NOTE — H&P ADULT - NSICDXFAMILYHX_GEN_ALL_CORE_FT
FAMILY HISTORY:  Father  Still living? Unknown  Patient unable to provide medical history, Age at diagnosis: Age Unknown    Mother  Still living? No  Patient unable to provide medical history, Age at diagnosis: Age Unknown

## 2021-07-16 NOTE — PROGRESS NOTE ADULT - SUBJECTIVE AND OBJECTIVE BOX
HPI: Ms. Hernandez is an 88 yo female with a pmh/o HTN, hypothyroidism, right knee OA, multiple falls & subsequent fractures, who presents to ED with HOTN, later found to have ANDREZ and EKG changes. Pt was at office to receive joint injection for right knee OA however after performing vitals, patient was found to have low blood pressure and sent to the ED. In the ED, patient was given fluids and became normotensive but was found to have Cr of 1.57. EKG was also positive for non-specific changes. Patient was subsequently admited to [facility] for workup of Hypotensive Crisis a/w ANDREZ. Patient additionally c/o right knee pain with limited ROM due to OA. She states pain is constant, 10/10, non radiating, throbbing in nature, with no alleviating factors, and aggravated by bearing weight.     SUBJECTIVE: Overnight, patient is     T(C): , Max: 36.6 (07-15-21 @ 21:53)  T(F): , Max: 97.9 (07-15-21 @ 21:53)  HR: 77 (07-16-21 @ 07:04)  BP: 116/46 (07-16-21 @ 07:04)  BP(mean): 71 (07-16-21 @ 00:40)  RR: 19 (07-16-21 @ 07:04)  SpO2: 97% (07-16-21 @ 07:04)  Wt(kg): --    PHYSICAL EXAM  Constitutional: Pt lying in bed, awake and alert, NAD  HEENT: EOMI, normal hearing, moist mucous membranes  Neck: Soft and supple, no JVD  Respiratory: CTABL, No wheezing, rales or rhonchi  Cardiovascular: S1S2+, RRR, no M/G/R  Gastrointestinal: BS+, soft, NT/ND, no guarding, no rebound  Extremities: No peripheral edema. TTP lateral and medial to the right patella, with limited ROM.  Vascular: 2+ peripheral pulses  Neurological: AAOx3, no focal deficits  Musculoskeletal: 5/5 strength b/l upper and lower extremities  Skin: No rashes    MEDICATIONS:  MEDICATIONS  (STANDING):  aspirin  chewable 81 milliGRAM(s) Oral daily  atorvastatin 10 milliGRAM(s) Oral at bedtime  buPROPion XL (24-Hour) . 300 milliGRAM(s) Oral daily  cholecalciferol 1000 Unit(s) Oral daily  heparin   Injectable 5000 Unit(s) SubCutaneous every 12 hours  levothyroxine 125 MICROGram(s) Oral daily  lidocaine   Patch 1 Patch Transdermal daily  sodium chloride 0.9%. 1000 milliLiter(s) (100 mL/Hr) IV Continuous <Continuous>  sodium chloride 0.9%. 1000 milliLiter(s) (100 mL/Hr) IV Continuous <Continuous>  spironolactone 25 milliGRAM(s) Oral daily    MEDICATIONS  (PRN):  acetaminophen   Tablet .. 650 milliGRAM(s) Oral every 6 hours PRN Mild Pain (1 - 3)  melatonin 3 milliGRAM(s) Oral at bedtime PRN Insomnia  senna 2 Tablet(s) Oral at bedtime PRN Constipation      LABS: All Labs Reviewed:                        11.7   6.69  )-----------( 255      ( 16 Jul 2021 07:46 )             35.4     07-16    141  |  111<H>  |  37<H>  ----------------------------<  83  4.6   |  23  |  1.24    Ca    9.0      16 Jul 2021 07:46  Phos  2.9     07-16  Mg     1.9     07-16    TPro  6.8  /  Alb  3.6  /  TBili  0.5  /  DBili  x   /  AST  11<L>  /  ALT  18  /  AlkPhos  91  07-15    PT/INR - ( 15 Jul 2021 13:12 )   PT: 11.9 sec;   INR: 1.02 ratio         PTT - ( 15 Jul 2021 13:12 )  PTT:31.1 sec  CARDIAC MARKERS ( 16 Jul 2021 07:46 )  <0.015 ng/mL / x     / x     / x     / x      CARDIAC MARKERS ( 16 Jul 2021 02:44 )  <0.015 ng/mL / x     / x     / x     / x      CARDIAC MARKERS ( 15 Jul 2021 13:12 )  <0.015 ng/mL / x     / x     / x     / x          Blood Culture: 07-15 @ 14:06  Organism --  Gram Stain Blood -- Gram Stain --  Specimen Source .Urine None  Culture-Blood --      I&O's Summary    CAPILLARY BLOOD GLUCOSE      POCT Blood Glucose.: 79 mg/dL (16 Jul 2021 07:43)      RADIOLOGY/EKG:  < from: CT Head No Cont (07.15.21 @ 13:25) >  IMPRESSION:   Mild periventricular white matter ischemia. Moderate global atrophy.    < end of copied text >   HPI: Ms. Hernandez is an 89 yo female with a pmh/o HTN, hypothyroidism, right knee OA, multiple falls & subsequent fractures, who presents to ED with HOTN, later found to have ANDREZ and EKG changes. Pt was at office to receive joint injection for right knee OA however after performing vitals, patient was found to have low blood pressure and sent to the ED. In the ED, patient was given fluids and became normotensive but was found to have Cr of 1.57. EKG was also positive for non-specific changes. Patient was subsequently admited to [facility] for workup of Hypotensive Crisis a/w ANDREZ. Patient additionally c/o right knee pain with limited ROM due to OA. She states pain is constant, 10/10, non radiating, throbbing in nature, with no alleviating factors, and aggravated by bearing weight.     SUBJECTIVE: Patient was examined at bedside. She appears uncomfortable, complaining of 10/10 throbbing left knee pain currently unrelieved on lidocaine patch. Patient was assured that she would receive further treatment for pain. No recent episodes of HOTN, no ha, dizziness, lethargy, AMS. Patient has no n/v, changes in urination or bm. No additional complaints    T(C): , Max: 36.6 (07-15-21 @ 21:53)  T(F): , Max: 97.9 (07-15-21 @ 21:53)  HR: 77 (07-16-21 @ 07:04)  BP: 116/46 (07-16-21 @ 07:04)  BP(mean): 71 (07-16-21 @ 00:40)  RR: 19 (07-16-21 @ 07:04)  SpO2: 97% (07-16-21 @ 07:04)  Wt(kg): --    PHYSICAL EXAM  Constitutional: Pt lying in bed, awake and alert, NAD  HEENT: EOMI, normal hearing, moist mucous membranes  Neck: Soft and supple, no JVD  Respiratory: CTABL, No wheezing, rales or rhonchi  Cardiovascular: S1S2+, RRR, no M/G/R  Gastrointestinal: BS+, soft, NT/ND, no guarding, no rebound  Extremities: No peripheral edema. TTP lateral and medial to the right patella, with limited ROM.  Vascular: 2+ peripheral pulses  Neurological: AAOx3, no focal deficits  Musculoskeletal: 5/5 strength b/l upper and lower extremities  Skin: No rashes    MEDICATIONS:  MEDICATIONS  (STANDING):  aspirin  chewable 81 milliGRAM(s) Oral daily  atorvastatin 10 milliGRAM(s) Oral at bedtime  buPROPion XL (24-Hour) . 300 milliGRAM(s) Oral daily  cholecalciferol 1000 Unit(s) Oral daily  heparin   Injectable 5000 Unit(s) SubCutaneous every 12 hours  levothyroxine 125 MICROGram(s) Oral daily  lidocaine   Patch 1 Patch Transdermal daily  sodium chloride 0.9%. 1000 milliLiter(s) (100 mL/Hr) IV Continuous <Continuous>  sodium chloride 0.9%. 1000 milliLiter(s) (100 mL/Hr) IV Continuous <Continuous>  spironolactone 25 milliGRAM(s) Oral daily    MEDICATIONS  (PRN):  acetaminophen   Tablet .. 650 milliGRAM(s) Oral every 6 hours PRN Mild Pain (1 - 3)  melatonin 3 milliGRAM(s) Oral at bedtime PRN Insomnia  senna 2 Tablet(s) Oral at bedtime PRN Constipation      LABS: All Labs Reviewed:                        11.7   6.69  )-----------( 255      ( 16 Jul 2021 07:46 )             35.4     07-16    141  |  111<H>  |  37<H>  ----------------------------<  83  4.6   |  23  |  1.24    Ca    9.0      16 Jul 2021 07:46  Phos  2.9     07-16  Mg     1.9     07-16    TPro  6.8  /  Alb  3.6  /  TBili  0.5  /  DBili  x   /  AST  11<L>  /  ALT  18  /  AlkPhos  91  07-15    PT/INR - ( 15 Jul 2021 13:12 )   PT: 11.9 sec;   INR: 1.02 ratio         PTT - ( 15 Jul 2021 13:12 )  PTT:31.1 sec  CARDIAC MARKERS ( 16 Jul 2021 07:46 )  <0.015 ng/mL / x     / x     / x     / x      CARDIAC MARKERS ( 16 Jul 2021 02:44 )  <0.015 ng/mL / x     / x     / x     / x      CARDIAC MARKERS ( 15 Jul 2021 13:12 )  <0.015 ng/mL / x     / x     / x     / x          Blood Culture: 07-15 @ 14:06  Organism --  Gram Stain Blood -- Gram Stain --  Specimen Source .Urine None  Culture-Blood --      I&O's Summary    CAPILLARY BLOOD GLUCOSE      POCT Blood Glucose.: 79 mg/dL (16 Jul 2021 07:43)      RADIOLOGY/EKG:  < from: CT Head No Cont (07.15.21 @ 13:25) >  IMPRESSION:   Mild periventricular white matter ischemia. Moderate global atrophy.    < end of copied text >

## 2021-07-16 NOTE — H&P ADULT - NEGATIVE NEUROLOGICAL SYMPTOMS
no transient paralysis/no weakness/no paresthesias/no syncope/no vertigo/no loss of sensation/no headache/no loss of consciousness/no hemiparesis/no confusion/no facial palsy

## 2021-07-16 NOTE — CONSULT NOTE ADULT - PROBLEM SELECTOR RECOMMENDATION 3
Frequent PACs; appears similar to past tracing; no symptoms to suggest a new cardiac problem.  No further work-up at this time.

## 2021-07-16 NOTE — H&P ADULT - HISTORY OF PRESENT ILLNESS
Pt is an 88 yo female with a pmh/o HTN, hypothyroidism, right knee OA, multiple falls & subsequent fractures, who presents to ED, sent from physicians office due to hypotension. Pt was at office to receive joint injection for right knee OA however after performing vitals, pt sent to ED. Pt was to be d/c'd from ED after remaining normotensive while there however failed ambulation trial and was admitted to medicine after cardiac w/u. Pt currently only c/o right knee pain with limited ROM due to OA. States pain is constant, 10/10, non radiating, throbbing in nature, no alleviating factors, aggravated by bearing weight.   ED Course: Pt received and given 500cc IVF after being found to have ANDREZ. Remained normotensive as above. EKG noted to have nonspecific changes from prior and trop ordered and pt admitted to telemetry for observation.  Pt is an 88 yo female with a pmh/o HTN, hypothyroidism, right knee OA, multiple falls & subsequent fractures, who presents to ED, sent from physicians office due to hypotension. Pt was at office to receive joint injection for right knee OA however after performing vitals, pt sent to ED. Pt was to be d/c'd from ED after remaining normotensive while there however failed ambulation trial and was admitted to medicine after cardiac w/u. Pt currently only c/o right knee pain with limited ROM due to OA. States pain is constant, 10/10, non radiating, throbbing in nature, no alleviating factors, aggravated by bearing weight.   ED Course: Pt received and given 500cc IVFx2 after being found to have ANDREZ. Remained normotensive as above. EKG noted to have nonspecific changes from prior and trop ordered and pt admitted to telemetry for observation.

## 2021-07-16 NOTE — PROVIDER CONTACT NOTE (OTHER) - SITUATION
Spoke w/ Cee at answering service. Left message regarding cardio consult
consult called into Dr. Garg's office, spoke to Melissa

## 2021-07-16 NOTE — PHYSICAL THERAPY INITIAL EVALUATION ADULT - MODALITIES TREATMENT COMMENTS
pt left seated in chair post Eval; chair alarm donned; bladder suction, HM in place; callbell in reach; pt instructed not to get up alone; call nursing for assist; xavier well; pain persists; RN Wanda made aware pt OOB

## 2021-07-16 NOTE — DISCHARGE NOTE NURSING/CASE MANAGEMENT/SOCIAL WORK - PATIENT PORTAL LINK FT
You can access the FollowMyHealth Patient Portal offered by [facility] by registering at the following website: [email or web address]. By joining [facility]’s FollowMyHealth portal, you will also be able to view your health information using other applications (apps) compatible with our system.

## 2021-07-16 NOTE — H&P ADULT - ASSESSMENT
Pt is an 89 yo female with a pmh/o HTN, OA, hypothyroidism, who presented to ED from MD office due to hypotension in setting of ANDREZ, found to have EKG changes, admitted  to observation for:    #Transient hypotension   #EKG changes  admit to telemetry  normotensive s/p 500cc IVF in ED  EKG with non specific inferolateral changes  trend troponin, if remains normotensive and trop wnl, consider downgrade to med/surg in AM  cardiology consulted-on call as pt cannot recall own cardiologist and not in EMR  cont ASA, statin, spironolactone  hold lasix, ACE as below  heparin for dvt ppx    #ANDREZ, baseline cr approx. 1 from EMR  hold lasix  hold ACE  i&o's via permafit  IVF for gentle hydration x 1L, monitor closely for s/s hypervolemia  nephrology consulted, Dr. Garg    #Inability to ambulate  #Intractable Right knee pain secondary to OA  lidoderm patch  PT consult  elevate and ice prn  fall precautions  OOB & ambulate with assistance    #Hypothyroidism  cont synthroid    #Anxiety  cont buproprion XL  melatonin for insomnia   Pt is an 88 yo female with a pmh/o HTN, OA, hypothyroidism, who presented to ED from MD office due to hypotension in setting of ANDREZ, found to have EKG changes, admitted  to observation for:    #Transient hypotension   #EKG changes  admit to telemetry  normotensive s/p 1000cc IVF in ED  EKG with non specific inferolateral changes  trend troponin, if remains normotensive and trop wnl, consider downgrade to med/surg in AM  cardiology consulted-on call as pt cannot recall own cardiologist and not in EMR  cont ASA, statin, spironolactone  hold lasix, ACE as below  heparin for dvt ppx    #ANDREZ, baseline cr approx. 1 from EMR  hold lasix  hold ACE  i&o's via permafit  IVF for gentle hydration x 1L, monitor closely for s/s hypervolemia  nephrology consulted, Dr. Garg    #Inability to ambulate  #Intractable Right knee pain secondary to OA  lidoderm patch  PT consult  elevate and ice prn  fall precautions  OOB & ambulate with assistance    #Hypothyroidism  cont synthroid    #Anxiety  cont buproprion XL  melatonin for insomnia

## 2021-07-16 NOTE — PROGRESS NOTE ADULT - ASSESSMENT
HPI: Ms. Hernandez is an 88 yo female with a pmh/o HTN, hypothyroidism, right knee OA, multiple falls & subsequent fractures, who presents to ED with HOTN, later found to have ANDREZ and EKG changes. She is admitted for:    #Transient hypotension due to dehydration vs cardiac dysfunction vs idiopathic  - Hypotensive on admission, currently 116/46  - s/p 1 L IVF in ED  - EKG with  non specific inferolateral changes  - Trops WNL x 2  - As per nephrology recs, continue hydration, hold lasiks, aldactone ok.  - Cardiology consulted, Dr. Jolly following  - C/W NS 1000 mL infusion  - Monitor telemetry  - Also hold ACE-I  - Cont ASA, statin, spironolactone    heparin for dvt ppx    #ANDREZ, baseline cr approx. 1 from EMR  - Continue to monitor renal function  - As per nephrology recs, continue hydration, hold lasiks, aldactone ok.  - Also hold ACE-I  - i&o's via permafit    #Intractable Right knee pain secondary to OA  - C/w Lidoderm patch  - Tylenol PRN for breakthrough pain  - PT consult  - elevate and ice prn  - fall precautions  - OOB & ambulate with assistance    #Hypothyroidism  - C/w synthroid    #Anxiety  - C/w buproprion XL  - melatonin for insomnia    #DISPO  -Discharge tomorrow if renal function and vitals are WNL    Case discussed with Dr. Bose

## 2021-07-16 NOTE — H&P ADULT - EXTREMITIES COMMENTS
RLE bent, knee erythematous however not tender to palpation, no obvious effusion or fluctuance, no cellulitis

## 2021-07-16 NOTE — H&P ADULT - CONVERSATION DETAILS
advanced care planning discussed with patient. patient states she has a DNR but does not want to be DNR/DNI at this time. Pt states she DOES WANT trial of CPR and intubation with family to act as surrogate should she become unable to make own decisions.

## 2021-07-16 NOTE — CONSULT NOTE ADULT - PROBLEM SELECTOR RECOMMENDATION 2
Labs suggestive of intravascular volume depletion; await AM labs to assess for improvement with IVF; if persistent, consider renal ultrasound.

## 2021-07-17 ENCOUNTER — INPATIENT (INPATIENT)
Facility: HOSPITAL | Age: 86
LOS: 1 days | Discharge: NURSING, REHAB OR LONG-TERM CARE | End: 2021-07-17
Attending: FAMILY MEDICINE | Admitting: FAMILY MEDICINE
Payer: MEDICARE

## 2021-07-17 DIAGNOSIS — F41.9 ANXIETY DISORDER, UNSPECIFIED: ICD-10-CM

## 2021-07-17 DIAGNOSIS — G47.00 INSOMNIA, UNSPECIFIED: ICD-10-CM

## 2021-07-17 DIAGNOSIS — N17.9 ACUTE KIDNEY FAILURE, UNSPECIFIED: ICD-10-CM

## 2021-07-17 DIAGNOSIS — Z88.5 ALLERGY STATUS TO NARCOTIC AGENT: ICD-10-CM

## 2021-07-17 DIAGNOSIS — I49.1 ATRIAL PREMATURE DEPOLARIZATION: ICD-10-CM

## 2021-07-17 DIAGNOSIS — I95.89 OTHER HYPOTENSION: ICD-10-CM

## 2021-07-17 DIAGNOSIS — E03.9 HYPOTHYROIDISM, UNSPECIFIED: ICD-10-CM

## 2021-07-17 DIAGNOSIS — Z90.49 ACQUIRED ABSENCE OF OTHER SPECIFIED PARTS OF DIGESTIVE TRACT: Chronic | ICD-10-CM

## 2021-07-17 DIAGNOSIS — I47.1 SUPRAVENTRICULAR TACHYCARDIA: ICD-10-CM

## 2021-07-17 DIAGNOSIS — F43.21 ADJUSTMENT DISORDER WITH DEPRESSED MOOD: ICD-10-CM

## 2021-07-17 DIAGNOSIS — I10 ESSENTIAL (PRIMARY) HYPERTENSION: ICD-10-CM

## 2021-07-17 DIAGNOSIS — E86.0 DEHYDRATION: ICD-10-CM

## 2021-07-17 DIAGNOSIS — Z91.81 HISTORY OF FALLING: ICD-10-CM

## 2021-07-17 DIAGNOSIS — I95.9 HYPOTENSION, UNSPECIFIED: ICD-10-CM

## 2021-07-17 DIAGNOSIS — M17.11 UNILATERAL PRIMARY OSTEOARTHRITIS, RIGHT KNEE: ICD-10-CM

## 2021-07-17 DIAGNOSIS — R94.31 ABNORMAL ELECTROCARDIOGRAM [ECG] [EKG]: ICD-10-CM

## 2021-07-17 DIAGNOSIS — Z79.82 LONG TERM (CURRENT) USE OF ASPIRIN: ICD-10-CM

## 2021-07-17 DIAGNOSIS — Z88.2 ALLERGY STATUS TO SULFONAMIDES: ICD-10-CM

## 2021-07-17 RX ORDER — POLYETHYLENE GLYCOL 3350 17 G/17G
17 POWDER, FOR SOLUTION ORAL EVERY 12 HOURS
Refills: 0 | Status: DISCONTINUED | OUTPATIENT
Start: 2021-07-17 | End: 2021-07-19

## 2021-07-17 RX ADMIN — HEPARIN SODIUM 5000 UNIT(S): 5000 INJECTION INTRAVENOUS; SUBCUTANEOUS at 21:46

## 2021-07-17 RX ADMIN — Medication 650 MILLIGRAM(S): at 14:15

## 2021-07-17 RX ADMIN — Medication 1000 UNIT(S): at 09:35

## 2021-07-17 RX ADMIN — LIDOCAINE 1 PATCH: 4 CREAM TOPICAL at 09:35

## 2021-07-17 RX ADMIN — Medication 81 MILLIGRAM(S): at 09:35

## 2021-07-17 RX ADMIN — BUPROPION HYDROCHLORIDE 300 MILLIGRAM(S): 150 TABLET, EXTENDED RELEASE ORAL at 09:35

## 2021-07-17 RX ADMIN — ATORVASTATIN CALCIUM 10 MILLIGRAM(S): 80 TABLET, FILM COATED ORAL at 21:47

## 2021-07-17 RX ADMIN — LIDOCAINE 1 PATCH: 4 CREAM TOPICAL at 19:00

## 2021-07-17 RX ADMIN — SPIRONOLACTONE 25 MILLIGRAM(S): 25 TABLET, FILM COATED ORAL at 09:35

## 2021-07-17 RX ADMIN — HEPARIN SODIUM 5000 UNIT(S): 5000 INJECTION INTRAVENOUS; SUBCUTANEOUS at 09:35

## 2021-07-17 RX ADMIN — Medication 125 MICROGRAM(S): at 05:49

## 2021-07-17 RX ADMIN — Medication 3 MILLIGRAM(S): at 21:47

## 2021-07-17 RX ADMIN — POLYETHYLENE GLYCOL 3350 17 GRAM(S): 17 POWDER, FOR SOLUTION ORAL at 21:46

## 2021-07-17 RX ADMIN — SENNA PLUS 2 TABLET(S): 8.6 TABLET ORAL at 17:04

## 2021-07-17 RX ADMIN — LIDOCAINE 1 PATCH: 4 CREAM TOPICAL at 21:00

## 2021-07-17 NOTE — PROGRESS NOTE ADULT - SUBJECTIVE AND OBJECTIVE BOX
CHIEF COMPLAINT: Severe knee pain    HPI:  89 year old woman with a history of HTN, hypothyroidism, right knee OA, multiple falls, who presented to the ER on 7/15 for the evaluation of hypotension that was noted in the outpatient setting during a visit with her orthopedic surgeon.  She was reportedly hypotensive and plans to perform a R knee joint infection was subsequently cancelled.  However, Bp was reportedly normal when she was seen by EMS; similarly normal (106/62) in the ED.  She describes mild generalized weakness but her main complaint this AM is severe knee pain (unable to identify exacerbating or alleviating factors).  She denies past history of heart disease.  7/17/21 awake alert sitting up in bed feels well tele SR with brief run SVT    PAST MEDICAL & SURGICAL HISTORY:  Hypothyroid  Osteoarthritis, localized Right knee  Hypertension  History of appendectomy  History of cholecystectomy    SOCIAL HISTORY:   Alcohol: Occasional wine  Smoking: Nonsmoker    FAMILY HISTORY: Patient unable to recall details    MEDICATIONS  (STANDING):  aspirin  chewable 81 milliGRAM(s) Oral daily  atorvastatin 10 milliGRAM(s) Oral at bedtime  buPROPion XL (24-Hour) . 300 milliGRAM(s) Oral daily  cholecalciferol 1000 Unit(s) Oral daily  heparin   Injectable 5000 Unit(s) SubCutaneous every 12 hours  levothyroxine 125 MICROGram(s) Oral daily  lidocaine   Patch 1 Patch Transdermal daily  sodium chloride 0.9%. 1000 milliLiter(s) (100 mL/Hr) IV Continuous <Continuous>  sodium chloride 0.9%. 1000 milliLiter(s) (100 mL/Hr) IV Continuous <Continuous>  spironolactone 25 milliGRAM(s) Oral daily    MEDICATIONS  (PRN):  acetaminophen   Tablet .. 650 milliGRAM(s) Oral every 6 hours PRN Mild Pain (1 - 3)  melatonin 3 milliGRAM(s) Oral at bedtime PRN Insomnia  senna 2 Tablet(s) Oral at bedtime PRN Constipation        Allergies:   oxycodone (Hives; Flushing)  sulfa drugs (Other)    Vital Signs Last 24 Hrs  T(C): 36.6 (17 Jul 2021 08:43), Max: 36.6 (17 Jul 2021 08:43)  T(F): 97.8 (17 Jul 2021 08:43), Max: 97.8 (17 Jul 2021 08:43)  HR: 82 (17 Jul 2021 08:43) (82 - 88)  BP: 112/56 (17 Jul 2021 08:43) (112/56 - 115/65)  BP(mean): --  RR: 18 (17 Jul 2021 08:43) (18 - 18)  SpO2: 95% (17 Jul 2021 08:43) (95% - 97%)    PHYSICAL EXAM:  Constitutional: NAD, awake and alert, appears stated age  Pulmonary: Non-labored, breath sounds are clear bilaterally  Cardiovascular: S1 and S2, regular rate and rhythm  Gastrointestinal: Abd soft, nontender.   Lymph: No edema. No cervical lymphadenopathy.  Neurological: Alert, no focal deficits  Skin: No rashes.  Psych:  Mood & affect appropriate    LABS:                     13.0   8.23  )-----------( 282      ( 15 Jul 2021 13:12 )             39.9     138    |  105    |  45     ----------------------------<  118    4.9     |  26     |  1.57     Ca    9.6        15 Jul 2021 13:12  TPro  6.8    /  Alb  3.6    /  TBili  0.5    /  DBili  x      /  AST  11     /  ALT  18     /  AlkPhos  91     15 Jul 2021 13:12    PT/INR - ( 15 Jul 2021 13:12 )   PT: 11.9 sec;   INR: 1.02 ratio    PTT - ( 15 Jul 2021 13:12 )  PTT:31.1 sec    CARDIAC MARKERS ( 16 Jul 2021 02:44 ) <0.015 ng/mL / x     / x     / x     / x      CARDIAC MARKERS ( 15 Jul 2021 13:12 ) <0.015 ng/mL / x     / x     / x     / x      12 Lead ECG (07.15.21 @ 13:56):  Sinus rhythm with Premature atrial complexes  Nonspecific ST and T wave abnormality    Tele:  Sinus, PACs           EXAM:  ECHO TTE WO CON COMP W DOPP         PROCEDURE DATE:  07/16/2021        INTERPRETATION:  Transthoracic Echocardiography Report (TTE)     Demographics     Patient name         ALFREDO CARRASCO  Age           89 year(s)  B     Med Rec #            [id number]          Gender        Female     Account #            [id number]       Date of Birth 05/27/1932     Interpreting         Esperanza Alberto MD Room Number   0342   Physician     Referring Physician  TORIBIO ADLER  Sonographer   DAMION Segura     Date of study        07/16/2021 11:05                        AM     Height           64.02 in           Weight        130.07 pounds    Type of Study:     TTE procedure: ECHO TTE WO CON COMP W DOP     BP: 116/50 mmHg     Study Location: 3ETechnical Quality: Technically Difficullt    Indications   1) R94.31 - Abnormal electrocardiogram ECG EKG    M-Mode Measurements (cm)     LVEDd: 4.25 cm                       LVESd: 2.9 cm   IVSEd: 0.75 cm   LVPWd: 0.95 cm                       AO Root Dimension: 3.3 cm   Rt. Vent. Dimension: 0 cm            ACS: 1.3 cm                         LA: 2.8 cm    Doppler Measurements:     AV Velocity:144 cm/s              MV Peak E-Wave: 75.5 cm/s   AV Peak Gradient: 8.29 mmHg       MV Peak A-Wave: 113 cm/s                                     MV E/A Ratio: 0.67 %   TR Velocity:156.25 cm/s           MV Peak Gradient: 2.28 mmHg   TR Gradient:9.144618 mmHg   Estimated RAP:5 mmHg   RVSP:15 mmHg     Findings     Mitral Valve   Mild mitral annular calcification is present.   EA reversal of the mitral inflow consistent with reduced compliance of the   left ventricle.   Trace to mild mitral regurgitation is present.     Aortic Valve   Trace aortic regurgitation is present.   Fibrocalcific changes noted to the Aortic valve leaflets with preserved   leaflet excursion.     Tricuspid Valve   Trace to mild tricuspid valve regurgitation is present.   The tricuspid valve leaflets are thin and pliable; valve motion is normal.     Pulmonic Valve   Pulmonic valve not well seen, probably normal pulmonic valve function.     Left Atrium   Normal appearing left atrium.     Left Ventricle   The left ventricle is normal in size, wall thickness, wall motion and   contractility.   Estimated left ventricular ejection fraction is 55-60 %.     Right Atrium   Normal appearing right atrium.     Right Ventricle   Normal appearing right ventricle structure and function.     Pericardial Effusion   No evidence of pericardial effusion.     Pleural Effusion   No evidence of pleural effusion.     Miscellaneous   The IVC appears normal.     Impression     Summary     Mild mitral annular calcification is present.   EA reversal of the mitral inflow consistent with reduced compliance of the   left ventricle.   Trace to mild mitral regurgitation is present.   Trace aortic regurgitation is present.   Fibrocalcific changes noted to the Aortic valve leaflets with preserved   leaflet excursion.   Trace to mild tricuspid valve regurgitation is present.   The tricuspid valve leaflets are thin and pliable; valve motion is normal.   Pulmonic valve not well seen, probably normal pulmonic valve function.   Normal appearing left atrium.   The left ventricle is normal in size, wall thickness, wall motion and   contractility.   Estimated left ventricular ejection fraction is 55-60 %.

## 2021-07-17 NOTE — PROGRESS NOTE ADULT - PROBLEM SELECTOR PLAN 3
Frequent PACs; appears similar to past tracing; no symptoms to suggest a new cardiac problem.  No further work-up at this time.  Will follow PRN

## 2021-07-17 NOTE — PROGRESS NOTE ADULT - ASSESSMENT
88 YO female with a pmh/o HTN, hypothyroidism, right knee OA, multiple falls & subsequent fractures, who presents to ED with HOTN, later found to have ANDREZ and EKG changes. She is admitted for:    #Transient hypotension due to dehydration vs cardiac dysfunction vs idiopathic  - Hypotensive on admission, currently 116/46  - s/p 1 L IVF in ED  - EKG with  non specific inferolateral changes  - Trops WNL x 2  - As per nephrology recs, continue hydration, hold lasiks, aldactone ok.  - Cardiology consulted, Dr. Jolly following  - C/W NS 1000 mL infusion  - Monitor telemetry  - Also hold ACE-I  - Cont ASA, statin, spironolactone      #ANDREZ, baseline cr approx. 1 from EMR  - Continue to monitor renal function  - As per nephrology recs, continue hydration, hold lasiks, aldactone ok.  - Also hold ACE-I  - i&o's via permafit    #Intractable Right knee pain secondary to OA  - C/w Lidoderm patch  - Tylenol PRN for breakthrough pain  - PT consult  - elevate and ice prn  - fall precautions  - OOB & ambulate with assistance    #Hypothyroidism  - C/w synthroid    #Anxiety  - C/w buproprion XL  - melatonin for insomnia    #DISPO  - Discharge pending improvement in depressive symptoms and placement to rehabilitation

## 2021-07-17 NOTE — PROGRESS NOTE ADULT - PROBLEM SELECTOR PLAN 2
·  Problem: ANDREZ (acute kidney injury) Labs suggestive of intravascular volume depletion BUN/Creat improved

## 2021-07-17 NOTE — PROGRESS NOTE ADULT - SUBJECTIVE AND OBJECTIVE BOX
89 YO F with a pmh/o HTN, hypothyroidism, right knee OA, multiple falls & subsequent fractures, who presents to ED with HOTN, later found to have ANDREZ and EKG changes. Pt was at office to receive joint injection for right knee OA however after performing vitals, patient was found to have low blood pressure and sent to the ED. In the ED, patient was given fluids and became normotensive but was found to have Cr of 1.57. EKG was also positive for non-specific changes. Patient was subsequently admited to telly for workup of Hypotensive Crisis a/w ANDREZ. Patient additionally c/o right knee pain with limited ROM due to OA. She states pain is constant, 10/10, non radiating, throbbing in nature, with no alleviating factors, and aggravated by bearing weight.     SUBJECTIVE: Patient was examined at bedside. She appears scared and anxious regarding her stay, concerned that she gets confused throughout the day, with many people going in and out of the room.     REVIEW OF SYSTEMS:    CONSTITUTIONAL: No weakness, fevers or chills  EYES/ENT: No visual changes;  No vertigo or throat pain   NECK: No pain or stiffness  RESPIRATORY: No cough, wheezing, hemoptysis; No shortness of breath  CARDIOVASCULAR: No chest pain or palpitations  GASTROINTESTINAL: No abdominal or epigastric pain. No nausea, vomiting; No diarrhea or constipation.   GENITOURINARY: No dysuria, frequency or hematuria  NEUROLOGICAL: No numbness or weakness  SKIN: No itching, rashes    Vital Signs Last 24 Hrs  T(C): 36.2 (17 Jul 2021 20:43), Max: 36.6 (17 Jul 2021 08:43)  T(F): 97.2 (17 Jul 2021 20:43), Max: 97.8 (17 Jul 2021 08:43)  HR: 84 (17 Jul 2021 20:43) (82 - 84)  BP: 125/57 (17 Jul 2021 20:43) (112/56 - 125/57)  BP(mean): --  RR: 18 (17 Jul 2021 20:43) (18 - 18)  SpO2: 96% (17 Jul 2021 20:43) (95% - 96%)    PHYSICAL EXAM  Constitutional: Pt lying in bed, awake and alert, NAD  HEENT: EOMI, normal hearing, moist mucous membranes  Neck: Soft and supple, no JVD  Respiratory: CTABL, No wheezing, rales or rhonchi  Cardiovascular: S1S2+, RRR, no M/G/R  Gastrointestinal: BS+, soft, NT/ND, no guarding, no rebound  Extremities: No peripheral edema. TTP lateral and medial to the right patella, with limited ROM.  Vascular: 2+ peripheral pulses  Neurological: AAOx3, no focal deficits  Musculoskeletal: 5/5 strength b/l upper and lower extremities  Skin: No rashes    MEDICATIONS  (STANDING):  aspirin  chewable 81 milliGRAM(s) Oral daily  atorvastatin 10 milliGRAM(s) Oral at bedtime  buPROPion XL (24-Hour) . 300 milliGRAM(s) Oral daily  cholecalciferol 1000 Unit(s) Oral daily  heparin   Injectable 5000 Unit(s) SubCutaneous every 12 hours  levothyroxine 125 MICROGram(s) Oral daily  lidocaine   Patch 1 Patch Transdermal daily  polyethylene glycol 3350 17 Gram(s) Oral every 12 hours  sodium chloride 0.9%. 1000 milliLiter(s) (100 mL/Hr) IV Continuous <Continuous>  sodium chloride 0.9%. 1000 milliLiter(s) (100 mL/Hr) IV Continuous <Continuous>  spironolactone 25 milliGRAM(s) Oral daily    MEDICATIONS  (PRN):  acetaminophen   Tablet .. 650 milliGRAM(s) Oral every 6 hours PRN Mild Pain (1 - 3)  melatonin 3 milliGRAM(s) Oral at bedtime PRN Insomnia  senna 2 Tablet(s) Oral at bedtime PRN Constipation      LABS: All Labs Reviewed:                        10.8   5.35  )-----------( 224      ( 16 Jul 2021 22:07 )             33.8   07-16    139  |  112<H>  |  37<H>  ----------------------------<  102<H>  5.0   |  23  |  1.17    Ca    8.5      16 Jul 2021 22:07  Phos  3.7     07-16  Mg     1.7     07-16      TPro  6.8  /  Alb  3.6  /  TBili  0.5  /  DBili  x   /  AST  11<L>  /  ALT  18  /  AlkPhos  91  07-15    PT/INR - ( 15 Jul 2021 13:12 )   PT: 11.9 sec;   INR: 1.02 ratio         PTT - ( 15 Jul 2021 13:12 )  PTT:31.1 sec  CARDIAC MARKERS ( 16 Jul 2021 07:46 )  <0.015 ng/mL / x     / x     / x     / x      CARDIAC MARKERS ( 16 Jul 2021 02:44 )  <0.015 ng/mL / x     / x     / x     / x      CARDIAC MARKERS ( 15 Jul 2021 13:12 )  <0.015 ng/mL / x     / x     / x     / x          Blood Culture: 07-15 @ 14:06  Organism --  Gram Stain Blood -- Gram Stain --  Specimen Source .Urine None  Culture-Blood --      I&O's Summary    CAPILLARY BLOOD GLUCOSE      POCT Blood Glucose.: 79 mg/dL (16 Jul 2021 07:43)      RADIOLOGY/EKG:  < from: CT Head No Cont (07.15.21 @ 13:25) >  IMPRESSION:   Mild periventricular white matter ischemia. Moderate global atrophy.    < end of copied text >

## 2021-07-18 DIAGNOSIS — F43.21 ADJUSTMENT DISORDER WITH DEPRESSED MOOD: ICD-10-CM

## 2021-07-18 LAB
ANION GAP SERPL CALC-SCNC: 6 MMOL/L — SIGNIFICANT CHANGE UP (ref 5–17)
BUN SERPL-MCNC: 24 MG/DL — HIGH (ref 7–23)
CALCIUM SERPL-MCNC: 9.9 MG/DL — SIGNIFICANT CHANGE UP (ref 8.5–10.1)
CHLORIDE SERPL-SCNC: 109 MMOL/L — HIGH (ref 96–108)
CO2 SERPL-SCNC: 23 MMOL/L — SIGNIFICANT CHANGE UP (ref 22–31)
CREAT SERPL-MCNC: 0.86 MG/DL — SIGNIFICANT CHANGE UP (ref 0.5–1.3)
GLUCOSE SERPL-MCNC: 96 MG/DL — SIGNIFICANT CHANGE UP (ref 70–99)
POTASSIUM SERPL-MCNC: 5.2 MMOL/L — SIGNIFICANT CHANGE UP (ref 3.5–5.3)
POTASSIUM SERPL-SCNC: 5.2 MMOL/L — SIGNIFICANT CHANGE UP (ref 3.5–5.3)
SODIUM SERPL-SCNC: 138 MMOL/L — SIGNIFICANT CHANGE UP (ref 135–145)
TSH SERPL-MCNC: 1.07 UU/ML — SIGNIFICANT CHANGE UP (ref 0.34–4.82)

## 2021-07-18 PROCEDURE — 99232 SBSQ HOSP IP/OBS MODERATE 35: CPT

## 2021-07-18 RX ORDER — LANOLIN ALCOHOL/MO/W.PET/CERES
5 CREAM (GRAM) TOPICAL AT BEDTIME
Refills: 0 | Status: DISCONTINUED | OUTPATIENT
Start: 2021-07-18 | End: 2021-07-19

## 2021-07-18 RX ORDER — IBUPROFEN 200 MG
400 TABLET ORAL ONCE
Refills: 0 | Status: COMPLETED | OUTPATIENT
Start: 2021-07-18 | End: 2021-07-18

## 2021-07-18 RX ORDER — LANOLIN ALCOHOL/MO/W.PET/CERES
5 CREAM (GRAM) TOPICAL ONCE
Refills: 0 | Status: COMPLETED | OUTPATIENT
Start: 2021-07-18 | End: 2021-07-18

## 2021-07-18 RX ORDER — CEFUROXIME AXETIL 250 MG
500 TABLET ORAL EVERY 12 HOURS
Refills: 0 | Status: DISCONTINUED | OUTPATIENT
Start: 2021-07-19 | End: 2021-07-19

## 2021-07-18 RX ORDER — TRAMADOL HYDROCHLORIDE 50 MG/1
25 TABLET ORAL ONCE
Refills: 0 | Status: DISCONTINUED | OUTPATIENT
Start: 2021-07-18 | End: 2021-07-18

## 2021-07-18 RX ORDER — ACETAMINOPHEN 500 MG
650 TABLET ORAL ONCE
Refills: 0 | Status: COMPLETED | OUTPATIENT
Start: 2021-07-18 | End: 2021-07-18

## 2021-07-18 RX ORDER — LIDOCAINE 4 G/100G
1 CREAM TOPICAL ONCE
Refills: 0 | Status: COMPLETED | OUTPATIENT
Start: 2021-07-18 | End: 2021-07-18

## 2021-07-18 RX ADMIN — Medication 81 MILLIGRAM(S): at 10:28

## 2021-07-18 RX ADMIN — POLYETHYLENE GLYCOL 3350 17 GRAM(S): 17 POWDER, FOR SOLUTION ORAL at 10:28

## 2021-07-18 RX ADMIN — Medication 650 MILLIGRAM(S): at 14:09

## 2021-07-18 RX ADMIN — Medication 650 MILLIGRAM(S): at 02:14

## 2021-07-18 RX ADMIN — Medication 5 MILLIGRAM(S): at 21:53

## 2021-07-18 RX ADMIN — Medication 125 MICROGRAM(S): at 05:43

## 2021-07-18 RX ADMIN — POLYETHYLENE GLYCOL 3350 17 GRAM(S): 17 POWDER, FOR SOLUTION ORAL at 21:53

## 2021-07-18 RX ADMIN — BUPROPION HYDROCHLORIDE 300 MILLIGRAM(S): 150 TABLET, EXTENDED RELEASE ORAL at 10:28

## 2021-07-18 RX ADMIN — ATORVASTATIN CALCIUM 10 MILLIGRAM(S): 80 TABLET, FILM COATED ORAL at 21:53

## 2021-07-18 RX ADMIN — HEPARIN SODIUM 5000 UNIT(S): 5000 INJECTION INTRAVENOUS; SUBCUTANEOUS at 10:28

## 2021-07-18 RX ADMIN — Medication 5 MILLIGRAM(S): at 02:13

## 2021-07-18 RX ADMIN — Medication 400 MILLIGRAM(S): at 21:51

## 2021-07-18 RX ADMIN — LIDOCAINE 1 PATCH: 4 CREAM TOPICAL at 10:28

## 2021-07-18 RX ADMIN — SPIRONOLACTONE 25 MILLIGRAM(S): 25 TABLET, FILM COATED ORAL at 10:28

## 2021-07-18 RX ADMIN — LIDOCAINE 1 PATCH: 4 CREAM TOPICAL at 19:00

## 2021-07-18 RX ADMIN — HEPARIN SODIUM 5000 UNIT(S): 5000 INJECTION INTRAVENOUS; SUBCUTANEOUS at 21:53

## 2021-07-18 RX ADMIN — Medication 650 MILLIGRAM(S): at 02:29

## 2021-07-18 RX ADMIN — LIDOCAINE 1 PATCH: 4 CREAM TOPICAL at 22:01

## 2021-07-18 RX ADMIN — SENNA PLUS 2 TABLET(S): 8.6 TABLET ORAL at 21:53

## 2021-07-18 RX ADMIN — Medication 400 MILLIGRAM(S): at 22:07

## 2021-07-18 RX ADMIN — Medication 1000 UNIT(S): at 10:28

## 2021-07-18 NOTE — BEHAVIORAL HEALTH ASSESSMENT NOTE - SUICIDE PROTECTIVE FACTORS
Responsibility to family and others/Identifies reasons for living/Has future plans/Supportive social network of family or friends/Fear of death or the actual act of killing self/Positive therapeutic relationships/Ability to cope with stress

## 2021-07-18 NOTE — BEHAVIORAL HEALTH ASSESSMENT NOTE - SUMMARY
Pt is a 89 y/o female with history of depression, had many deaths in her family including son and best friend in the past year, compliant with bupropion XL 300mg po daily who is endorsing depressed mood and passive suicidal ideations due to loss of her son. Pt denies active suicidal ideations with intent or plan at this time. She is future oriented-  involved in senior center in [place], lives with her granddaughter and granddaughter's , and has community social support in her brother, two sons and grandchildren.

## 2021-07-18 NOTE — BEHAVIORAL HEALTH ASSESSMENT NOTE - NSBHREFERDETAILS_PSY_A_CORE_FT
Assess for depression. 7 month hx of feeling down about son's death. Today she is tearful, states she wishes she was "no longer here."

## 2021-07-18 NOTE — BEHAVIORAL HEALTH ASSESSMENT NOTE - RISK ASSESSMENT
Pt endorsed depressed mood as she has been thinking about her son who passed away 7 months due to COVID pneumonia. Pt denies active suicidal ideations with intent or plan at this time. Pt endorsed passive suicidal ideations. She has family support in the community. She is also involved in [employer] and enjoys playing [employer]. She was placed on Bupropion by her PCP and has been compliant with medication.    Plan:   Continue Bupropion XL 300mg po daily  Patient would benefit from individual psychotherapy  Patient should follow up with outpatient psychiatrist and therapist Low Acute Suicide Risk Pt endorsed depressed mood as she has been thinking about her son who passed away 7 months due to COVID pneumonia. Pt denies active suicidal ideations with intent or plan at this time. Pt endorsed passive suicidal ideations. She has family support in the community. She is also involved in [employer] and enjoys playing [employer]. She was placed on Bupropion by her PCP and has been compliant with medication.    Plan:   patient does not meet criteria for psychiatric inpatient hospitalization  Continue Bupropion XL 300mg po daily  Patient would benefit from individual psychotherapy  Patient should follow up with outpatient psychiatrist and therapist upon discharge

## 2021-07-18 NOTE — PROGRESS NOTE ADULT - SUBJECTIVE AND OBJECTIVE BOX
HPI: 89 YO F with a pmh/o HTN, hypothyroidism, right knee OA, multiple falls & subsequent fractures, who presents to ED with HOTN, later found to have ANDREZ and EKG changes. Pt was at office to receive joint injection for right knee OA however after performing vitals, patient was found to have low blood pressure and sent to the ED. In the ED, patient was given fluids and became normotensive but was found to have Cr of 1.57. EKG was also positive for non-specific changes. Patient was subsequently admited to telly for workup of Hypotensive Crisis a/w ANDREZ. Patient additionally c/o right knee pain with limited ROM due to OA. She states pain is constant, 10/10, non radiating, throbbing in nature, with no alleviating factors, and aggravated by bearing weight.     SUBJECTIVE: Patient was examined by me at bedside in the AM. She is currently not complaining about knee pain but presents as tearful with depressed mood. She endorses 7 month HX of persistent grief over her son's death due to COVID and states that she wishes she "was not here." Her patients  HPI: 89 YO F with a pmh/o HTN, hypothyroidism, right knee OA, multiple falls & subsequent fractures, who presents to ED with HOTN, later found to have ANDREZ and EKG changes. Pt was at office to receive joint injection for right knee OA however after performing vitals, patient was found to have low blood pressure and sent to the ED. In the ED, patient was given fluids and became normotensive but was found to have Cr of 1.57. EKG was also positive for non-specific changes. Patient was subsequently admited to telly for workup of Hypotensive Crisis a/w ANDREZ. Patient additionally c/o right knee pain with limited ROM due to OA. She states pain is constant, 10/10, non radiating, throbbing in nature, with no alleviating factors, and aggravated by bearing weight.     SUBJECTIVE: Patient was examined by me at bedside in the AM. She is currently not complaining about knee pain but presents as tearful with depressed mood. She endorses 7 month HX of persistent grief over her son's death due to COVID and states that she wishes she "was not here." Patient is ambivalent about mental health counselor and informed that although psychiatry would be consulted, she is free to say as much or as little as she want. Patient currently endorses no other symptoms. No ha/f/c, cp, palpitations, n/v, changes in urination and bm. She has no additional complaints and no overnight events.    Vital Signs Last 24 Hrs  T(C): 36.7 (18 Jul 2021 08:31), Max: 36.7 (18 Jul 2021 08:31)  T(F): 98 (18 Jul 2021 08:31), Max: 98 (18 Jul 2021 08:31)  HR: 74 (18 Jul 2021 08:31) (74 - 84)  BP: 116/61 (18 Jul 2021 08:31) (116/61 - 125/57)  BP(mean): --  RR: 18 (18 Jul 2021 08:31) (18 - 18)  SpO2: 95% (18 Jul 2021 08:31) (95% - 96%)    PHYSICAL EXAM  Constitutional: Pt lying in bed, awake and alert, NAD  HEENT: EOMI, normal hearing, moist mucous membranes  Neck: Soft and supple, no JVD  Respiratory: CTABL, No wheezing, rales or rhonchi  Cardiovascular: S1S2+, RRR, no M/G/R  Gastrointestinal: BS+, soft, NT/ND, no guarding, no rebound  Extremities: No peripheral edema. TTP lateral and medial to the right patella, with limited ROM.  Vascular: 2+ peripheral pulses  Neurological: AAOx3, no focal deficits  Musculoskeletal: 5/5 strength b/l upper and lower extremities  Skin: No rashes      MEDICATIONS:  MEDICATIONS  (STANDING):  aspirin  chewable 81 milliGRAM(s) Oral daily  atorvastatin 10 milliGRAM(s) Oral at bedtime  buPROPion XL (24-Hour) . 300 milliGRAM(s) Oral daily  cholecalciferol 1000 Unit(s) Oral daily  heparin   Injectable 5000 Unit(s) SubCutaneous every 12 hours  levothyroxine 125 MICROGram(s) Oral daily  lidocaine   Patch 1 Patch Transdermal daily  polyethylene glycol 3350 17 Gram(s) Oral every 12 hours  sodium chloride 0.9%. 1000 milliLiter(s) (100 mL/Hr) IV Continuous <Continuous>  sodium chloride 0.9%. 1000 milliLiter(s) (100 mL/Hr) IV Continuous <Continuous>  spironolactone 25 milliGRAM(s) Oral daily    MEDICATIONS  (PRN):  acetaminophen   Tablet .. 650 milliGRAM(s) Oral every 6 hours PRN Mild Pain (1 - 3)  melatonin 3 milliGRAM(s) Oral at bedtime PRN Insomnia  senna 2 Tablet(s) Oral at bedtime PRN Constipation      LABS: All Labs Reviewed:                        10.8   5.35  )-----------( 224      ( 16 Jul 2021 22:07 )             33.8     07-18    138  |  109<H>  |  24<H>  ----------------------------<  96  5.2   |  23  |  0.86    Ca    9.9      18 Jul 2021 12:04  Phos  3.7     07-16  Mg     1.7     07-16            Blood Culture: 07-15 @ 14:06  Organism --  Gram Stain Blood -- Gram Stain --  Specimen Source .Urine None  Culture-Blood --      I&O's Summary    17 Jul 2021 07:01  -  18 Jul 2021 07:00  --------------------------------------------------------  IN: 0 mL / OUT: 1600 mL / NET: -1600 mL      CAPILLARY BLOOD GLUCOSE          RADIOLOGY/EKG:  < from: CT Head No Cont (07.15.21 @ 13:25) >  IMPRESSION:   Mild periventricular white matter ischemia. Moderate global atrophy.    < end of copied text >

## 2021-07-18 NOTE — BEHAVIORAL HEALTH ASSESSMENT NOTE - OTHER
death of her son and best friend pill rolling tremor in right hand actively engages in senior center deferred as pt was lying in [facility] bed

## 2021-07-18 NOTE — BEHAVIORAL HEALTH ASSESSMENT NOTE - HPI (INCLUDE ILLNESS QUALITY, SEVERITY, DURATION, TIMING, CONTEXT, MODIFYING FACTORS, ASSOCIATED SIGNS AND SYMPTOMS)
Pt is a 88 y/o female, , domiciled with granddaughter and her , with past psychiatric history of depression and PMHx of HTN,OA, hypothyroidism currently admitted on medical floor evaluation of generalized weakness.    Psych was consulted "Assess for depression. 7 month hx of feeling down about son's death. Today she is tearful, states she wishes she was no longer here."    Pt is AAOx3, comfortable lying in gurney bed, remained calm and cooperative. Pt was tearful in interview. Pt stated she has wished sometimes she is no longer here but that does not mean she would take her life. Pt states "I don't have the guts to kill myself". She reports she lives in her house, goes to play [employer] with her friend at [employer] in [place] every Tuesday and Friday. Pt endorsed depressed mood stating her 56 year old son  of COVID pneumonia 7 months ago. She has two other son who live in [place] and has many grandchildren. Pt states and two of her neighbors and best friend also  in the last year. Pt states that she feels lonely as many of her close friends have passed away. She reports fair appetite and poor sleep. Pt states she has never seen a psychiatrist. She saw a therapist many years ago but she did not like going to therapy. She denies homicidal ideations, auditory/visual hallucinations at this time. She is being prescribed bupropion 300mg po daily for depression by her PCP. She reports compliance with medications, denies any side effects. Pt reports her PCP has been encouraging her to go to therapy so she can talk about herself. Pt states she does not want to talk about her life. She has her two other sons, brother and friend in her life. Pt was encouraged to engage in therapy which would be helpful in dealing with her grief. Pt verbalized understanding.

## 2021-07-18 NOTE — PROGRESS NOTE ADULT - ASSESSMENT
89 YO female with a pmh/o HTN, hypothyroidism, right knee OA, multiple falls & subsequent fractures, who presents to ED with HOTN, later found to have ANDREZ and EKG changes. She is admitted for:    #Transient hypotension due to dehydration vs cardiac dysfunction vs idiopathic  - Hypotensive on admission, currently 116/46  - s/p 1 L IVF in ED  - EKG with  non specific inferolateral changes  - Trops WNL x 2  - As per nephrology recs, continue hydration, hold lasiks, aldactone ok.  - Cardiology consulted, Dr. Jolly following  - C/W NS 1000 mL infusion  - Monitor telemetry  - Also hold ACE-I  - Cont ASA, statin, spironolactone      #ANDREZ, baseline cr approx. 1 from EMR  - Continue to monitor renal function  - As per nephrology recs, continue hydration, hold lasiks, aldactone ok.  - Also hold ACE-I  - i&o's via permafit    #Intractable Right knee pain secondary to OA  - C/w Lidoderm patch  - Tylenol PRN for breakthrough pain  - PT consult  - elevate and ice prn  - fall precautions  - OOB & ambulate with assistance    #Hypothyroidism  - C/w synthroid    #Anxiety  - C/w buproprion XL  - melatonin for insomnia    #DISPO  - Discharge pending improvement in depressive symptoms and placement to rehabilitation   88 YO female with a pmh/o HTN, hypothyroidism, right knee OA, multiple falls & subsequent fractures, who presents to ED with HOTN, later found to have ANDREZ and EKG changes. She is admitted for:    #Transient hypotension due to dehydration vs cardiac dysfunction vs idiopathic  - Hypotensive on admission, currently 116/46  - s/p 1 L IVF in ED  - EKG with  non specific inferolateral changes  - Trops WNL x 2  - As per nephrology recs 7/16, can d/c ivf if taking adequate po; hold lasiks, aldactone ok.  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 1 from EMR  - Cr at .86 as of 7/18; Improved  - Continue to monitor renal function  - As per nephrology recs 7/16, can d/c ivf if taking adequate po; hold lasiks, aldactone ok.  - Also hold ACE-I  - i&o's via permafit    #Intractable Right knee pain secondary to OA  -Improved  - C/w Lidoderm patch  - Tylenol PRN for breakthrough pain  - PT consult  - elevate and ice prn  - fall precautions  - OOB & ambulate with assistance    #Hypothyroidism  - C/w synthroid    #Anxiety  - C/w buproprion XL  - melatonin for insomnia    #DISPO  - Discharge pending improvement in depressive symptoms and placement to rehabilitation   89 YO female with a pmh/o HTN, hypothyroidism, right knee OA, multiple falls & subsequent fractures, who presents to ED with HOTN, later found to have ANDREZ and EKG changes. She is admitted for:    #Transient hypotension due to dehydration vs cardiac dysfunction vs idiopathic  - Hypotensive on admission, currently 116/46  - s/p 1 L IVF in ED  - EKG with  non specific inferolateral changes  - Trops WNL x 2  - As per nephrology recs 7/16, can d/c ivf if taking adequate po; hold lasiks, aldactone ok.  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 1 from EMR  - Cr at .86 as of 7/18; Improved  - Continue to monitor renal function  - As per nephrology recs 7/16, can d/c ivf if taking adequate po; hold lasiks, aldactone ok.  - Also hold ACE-I    Case discussed with Dr. Bose  - i&o's via permafit    #Intractable Right knee pain secondary to OA  -Improved  - C/w Lidoderm patch  - Tylenol PRN for breakthrough pain  - PT consult  - elevate and ice prn  - fall precautions  - OOB & ambulate with assistance    #Hypothyroidism  - C/w synthroid    #Anxiety  - C/w buproprion XL  - melatonin for insomnia    #DISPO  - Discharge pending improvement in depressive symptoms and placement to rehabilitation

## 2021-07-18 NOTE — BEHAVIORAL HEALTH ASSESSMENT NOTE - DETAILS
Detail Level: Zone Pt denies past suicidal ideations and denies current suicidal ideations with intent or plan at this time

## 2021-07-18 NOTE — PROGRESS NOTE ADULT - ATTENDING COMMENTS
89 YO female with a pmh/o HTN, hypothyroidism, right knee OA, multiple falls & subsequent fractures, who presents to ED with HOTN, later found to have ANDREZ and EKG changes. She is admitted for:    #Transient hypotension due to dehydration vs cardiac dysfunction vs idiopathic  - Hypotensive on admission, currently 116/46  - s/p 1 L IVF in ED  - EKG with  non specific inferolateral changes  - Trops WNL x 2  - As per nephrology recs, continue hydration, hold lasiks, aldactone ok.  - Cardiology consulted, Dr. Jolly following  - C/W NS 1000 mL infusion  - Monitor telemetry  - Also hold ACE-I  - Cont ASA, statin, spironolactone
88 yo female with a pmh/o HTN, hypothyroidism, right knee OA, multiple falls & subsequent fractures, who presents to ED with HOTN, later found to have ANDREZ and EKG changes.    #Transient hypotension due to dehydration vs cardiac dysfunction vs idiopathic  - Hypotensive on admission, currently 116/46  - s/p 1 L IVF in ED  - EKG with  non specific inferolateral changes  - Trops WNL x 2  - As per nephrology recs, continue hydration, hold lasiks, aldactone ok.  - Cardiology consulted, Dr. Jolly following  - C/W NS 1000 mL infusion  - Monitor telemetry  - Also hold ACE-I  - Cont ASA, statin, spironolactone
89 YO female with a pmh/o HTN, hypothyroidism, right knee OA, multiple falls & subsequent fractures, who presents to ED with HOTN, later found to have ANDREZ and EKG changes. She is admitted for:    #Transient hypotension due to dehydration vs cardiac dysfunction vs idiopathic  - Hypotensive on admission, currently 116/46  - s/p 1 L IVF in ED  - EKG with  non specific inferolateral changes  - Trops WNL x 2  - As per nephrology recs 7/16, can d/c ivf if taking adequate po; hold lasiks, aldactone ok.  - As per cardiology recs 7/18: HOTN resolved; can discontinue telly  - C/W NS 1000 mL infusion  - Also hold ACE-I  - Cont ASA, statin, spironolactone    #Depressed mood likely 2/2 Persistent Bereavement Disorder vs MDD  - Endorses 7 months HX of persistent grief over son's death  - Psych consulted; recs appreciated
Hypotension has resolved; overall cardiac status appears stable; can d/c telemetry -- will f/.u PRN

## 2021-07-18 NOTE — BEHAVIORAL HEALTH ASSESSMENT NOTE - NSBHCONSULTOBSREASON_PSY_A_CORE FT
Pt denies suicidal or homicidal ideations at this time. She is not a danger to self and others at this time.

## 2021-07-18 NOTE — BEHAVIORAL HEALTH ASSESSMENT NOTE - COMMENTS ON SUICIDE RISK/PROTECTIVE FACTORS:
Pt remains at low risk of suicide as she presents future oriented and is actively involved in senior center

## 2021-07-19 ENCOUNTER — TRANSCRIPTION ENCOUNTER (OUTPATIENT)
Age: 86
End: 2021-07-19

## 2021-07-19 VITALS
HEART RATE: 70 BPM | DIASTOLIC BLOOD PRESSURE: 41 MMHG | RESPIRATION RATE: 19 BRPM | TEMPERATURE: 98 F | OXYGEN SATURATION: 95 % | SYSTOLIC BLOOD PRESSURE: 109 MMHG

## 2021-07-19 LAB — SARS-COV-2 RNA SPEC QL NAA+PROBE: SIGNIFICANT CHANGE UP

## 2021-07-19 RX ORDER — LANOLIN ALCOHOL/MO/W.PET/CERES
1 CREAM (GRAM) TOPICAL
Qty: 0 | Refills: 0 | DISCHARGE
Start: 2021-07-19

## 2021-07-19 RX ADMIN — Medication 1000 UNIT(S): at 10:06

## 2021-07-19 RX ADMIN — Medication 500 MILLIGRAM(S): at 10:06

## 2021-07-19 RX ADMIN — HEPARIN SODIUM 5000 UNIT(S): 5000 INJECTION INTRAVENOUS; SUBCUTANEOUS at 10:06

## 2021-07-19 RX ADMIN — Medication 125 MICROGRAM(S): at 05:15

## 2021-07-19 RX ADMIN — Medication 650 MILLIGRAM(S): at 08:02

## 2021-07-19 RX ADMIN — LIDOCAINE 1 PATCH: 4 CREAM TOPICAL at 10:07

## 2021-07-19 RX ADMIN — POLYETHYLENE GLYCOL 3350 17 GRAM(S): 17 POWDER, FOR SOLUTION ORAL at 10:07

## 2021-07-19 RX ADMIN — Medication 81 MILLIGRAM(S): at 10:06

## 2021-07-19 RX ADMIN — SPIRONOLACTONE 25 MILLIGRAM(S): 25 TABLET, FILM COATED ORAL at 10:06

## 2021-07-19 RX ADMIN — BUPROPION HYDROCHLORIDE 300 MILLIGRAM(S): 150 TABLET, EXTENDED RELEASE ORAL at 10:06

## 2021-07-19 NOTE — DISCHARGE NOTE PROVIDER - NSDCCPCAREPLAN_GEN_ALL_CORE_FT
PRINCIPAL DISCHARGE DIAGNOSIS  Diagnosis: Well adult exam  Assessment and Plan of Treatment: You came here because you had low blood pressure and went to the ED. In the ED, patient you were given fluids and your blood pressure normalized but you were found to have acute kidney injury.   Your head imaging didn't show anything specific but your EKG showed some non-specific changes. You were then admitted for workup of hypotension and your kidney condtion. You were also worried about your knee pain. Nephrology was consulted and changed up your diuretic regimen. Cardiology followed your case and kept tags on you. Heart imaging was largely normal. Due to knee pain, you were given lidoderm patich and tylenol PRN. Your hospital course was complicated by episodes of depressed mood, probably due to adjustment disorder with depressed mood due to the death of your son. Psychiatry saw you and suggested you continue your buproprion and follow with your outpatient psychiatrist. Today you feel better with normal blood pressure, improved right knee pain. You are stable for discharge from both a medicine and psychiatric perspective and will be discharged to rehab on today 7/19/21.  - Please make an appointment with your PCP and psychiatrist immediately.  - Please continue to take your blood pressure meds, thyorid meds, and cholesteol meds as directed.  -Follow all instructions as directed by your rehab center        SECONDARY DISCHARGE DIAGNOSES  Diagnosis: ANDREZ (acute kidney injury)  Assessment and Plan of Treatment: -Your kidney problem resolved when we gave you fluids and temporarily stopped one of your bp meds.     PRINCIPAL DISCHARGE DIAGNOSIS  Diagnosis: Well adult exam  Assessment and Plan of Treatment: You came here because you had low blood pressure and went to the ED. In the ED, patient you were given fluids and your blood pressure normalized but you were found to have acute kidney injury.   Your head imaging didn't show anything specific but your EKG showed some non-specific changes. You were then admitted for workup of hypotension and your kidney condtion. You were also worried about your knee pain. Nephrology was consulted and changed up your diuretic regimen. Cardiology followed your case and kept tags on you. Heart imaging was largely normal. Due to knee pain, you were given lidoderm patch and tylenol PRN. Your hospital course was complicated by episodes of depressed mood, probably due to adjustment disorder with depressed mood due to the death of your son. Psychiatry saw you and suggested you continue your buproprion and follow with your outpatient psychiatrist. Today you feel better with normal blood pressure, improved right knee pain. You are stable for discharge from both a medicine and psychiatric perspective and will be discharged to rehab on today 7/19/21.  - Please make an appointment with your PCP and psychiatrist immediately.  - Please continue to take your blood pressure meds, thyorid meds, and cholesteol meds as directed.  -Follow all instructions as directed by your rehab center        SECONDARY DISCHARGE DIAGNOSES  Diagnosis: ANDREZ (acute kidney injury)  Assessment and Plan of Treatment: -Your kidney problem resolved when we gave you fluids and temporarily stopped one of your bp meds.

## 2021-07-19 NOTE — DISCHARGE NOTE PROVIDER - CARE PROVIDER_API CALL
Tomas Gonsalez)  Family Medicine  [address] [place] [address]  Phone: [phone]  Fax: [phone]  Follow Up Time:     Paras Pacheco)  [place] [address]  Phone: [phone]  Fax: [phone]  Follow Up Time:

## 2021-07-19 NOTE — DISCHARGE NOTE PROVIDER - HOSPITAL COURSE
HPI: 88 YO F with a pmh/o HTN, hypothyroidism, right knee OA, multiple falls & subsequent fractures, who presents to ED with HOTN, later found to have ANDREZ and EKG changes. Pt was at office to receive joint injection for right knee OA however after performing vitals, patient was found to have low blood pressure and sent to the ED. In the ED, patient was afebrile but HOTN, given fluids and became normotensive but was found to have Cr of 1.57. Head CT and CXR was negative for acute changes, but EKG was positive for non-specific changes. Patient was subsequently admitted to [facility] for workup of Hypotensive Crisis a/w ANDREZ. She had additional complaint of knee pain with limited ROM due to OA. She states pain is constant, 10/10, non radiating, throbbing in nature, with no alleviating factors, and aggravated by bearing weight. Nephrology was consulted for the ANDREZ and hyopotension, and recommended holding lasix and continuing aldactone. Patient was seen by cardiology  who followed her case but declined further workup for EKG changes and hypotension. TTE showed normal EF and LV. Due to knee pain, she was given lidoderm patich and tylenol PRN. Patient's hospital course was complicated by episodes of depressed mood, likely 2/2 adjustment disorder with depressed mood due to the death of her son. Psychiatry saw her and suggested her continue with Buproprion and follow up with outpatient psychiatry, clearing her for discharge. Today patient is afebrile, hemodynamically stable, with normal bp, with improved right knee pain and stable for discharge as per psychiatry. She will be discharged to rehab on today 7/19/21, and will follow up outpatient with her PCP and psychiatrist.    SUBJECTIVE: I saw patient on chair this morning in the AM, answering questions about her plan for discharge and gave further information to her son who later came into the room. Patient would prefer [facility] but is generally amenable to rehab. She endorses no more HOTN and that her right knee pain, although still present, is better controlled on her medication.  Patient is not tearful today. She denies ha, dizziness, palpitations, SOB. She has no n/v changes in urination or BM. No additional complaints and no overnight events. ROS as listed above.    Vital Signs Last 24 Hrs  T(C): 36.4 (19 Jul 2021 07:22), Max: 36.4 (19 Jul 2021 07:22)  T(F): 97.6 (19 Jul 2021 07:22), Max: 97.6 (19 Jul 2021 07:22)  HR: 70 (19 Jul 2021 07:22) (70 - 87)  BP: 109/41 (19 Jul 2021 07:22) (109/41 - 111/59)  BP(mean): --  RR: 19 (19 Jul 2021 07:22) (18 - 19)  SpO2: 95% (19 Jul 2021 07:22) (95% - 96%)    PHYSICAL EXAM  Constitutional: Pt lying in bed, awake and alert, NAD  HEENT: EOMI, normal hearing, moist mucous membranes  Neck: Soft and supple, no JVD  Respiratory: CTABL, No wheezing, rales or rhonchi  Cardiovascular: S1S2+, RRR, no M/G/R  Gastrointestinal: BS+, soft, NT/ND, no guarding, no rebound  Extremities: No peripheral edema  Vascular: 2+ peripheral pulses  Neurological: AAOx3, no focal deficits  Musculoskeletal: 5/5 strength b/l upper and lower extremities  Skin: No rashes    RADIOLOGY/EKG:  < from: CT Head No Cont (07.15.21 @ 13:25) >  IMPRESSION:   Mild periventricular white matter ischemia. Moderate global atrophy.    < from: Xray Chest 1 View- PORTABLE-Urgent (Xray Chest 1 View- PORTABLE-Urgent .) (07.15.21 @ 13:13) >  IMPRESSION:  Atelectasis or scarring at the left lung base again seen.  Mild atelectasis at the right lung base.  Evidence of right rib and left shoulder fracture deformity which is new since September 2019, but may be old. Clinical correlation is recommended.   HPI: 89 YO F with a pmh/o HTN, hypothyroidism, right knee OA, multiple falls & subsequent fractures, who presents to ED with HOTN, later found to have ANDREZ and EKG changes. Pt was at  Ortho office to receiving  joint injection for right knee OA however after performing vitals, patient was found to have low blood pressure and sent to the ED. In the ED, patient was afebrile but hypotensive, given fluids and became normotensive but was found to have Cr of 1.57. Head CT and CXR was negative for acute changes, but EKG was positive for non-specific changes. Patient was subsequently admitted to telemetry for workup of Hypotensive Crisis a/w ANDREZ. She had additional complaint of knee pain with limited ROM due to OA. She states pain is constant, 10/10, non radiating, throbbing in nature, with no alleviating factors, and aggravated by bearing weight. Nephrology was consulted for the ANDREZ and hyopotension, and recommended holding lasix and continuing aldactone. Patient was seen by cardiology  who followed her case but declined further workup for EKG changes and hypotension. TTE showed normal EF and LV. Due to knee pain, she was given lidoderm patch and tylenol PRN. Patient's hospital course was complicated by episodes of depressed mood, likely 2/2 adjustment disorder with depressed mood due to the death of her son. Psychiatry saw her and suggested her continue with Buproprion and follow up with outpatient psychiatry, clearing her for discharge. Today patient is afebrile, hemodynamically stable, with normal bp, with improved right knee pain and stable for discharge. She will be discharged to rehab today 7/19/21, and will follow up outpatient with her PCP and psychiatrist.    SUBJECTIVE: I saw patient on chair this morning in the AM, answering questions about her plan for discharge and gave further information to her son who later came into the room. Patient would prefer [facility] but is generally amenable to rehab. She endorses no more hypotension and that her right knee pain, although still present, is better controlled on her medication.  Patient is not tearful today. She denies ha, dizziness, palpitations, SOB. She has no n/v changes in urination or BM. No additional complaints and no overnight events. ROS as listed above.    Vital Signs Last 24 Hrs  T(C): 36.4 (19 Jul 2021 07:22), Max: 36.4 (19 Jul 2021 07:22)  T(F): 97.6 (19 Jul 2021 07:22), Max: 97.6 (19 Jul 2021 07:22)  HR: 70 (19 Jul 2021 07:22) (70 - 87)  BP: 109/41 (19 Jul 2021 07:22) (109/41 - 111/59)  BP(mean): --  RR: 19 (19 Jul 2021 07:22) (18 - 19)  SpO2: 95% (19 Jul 2021 07:22) (95% - 96%)    PHYSICAL EXAM  Constitutional: Pt lying in bed, awake and alert, NAD  HEENT: EOMI, normal hearing, moist mucous membranes  Neck: Soft and supple, no JVD  Respiratory: CTABL, No wheezing, rales or rhonchi  Cardiovascular: S1S2+, RRR, no M/G/R  Gastrointestinal: BS+, soft, NT/ND, no guarding, no rebound  Extremities: No peripheral edema  Vascular: 2+ peripheral pulses  Neurological: AAOx3, no focal deficits  Musculoskeletal: 5/5 strength b/l upper and lower extremities  Skin: No rashes    RADIOLOGY/EKG:  < from: CT Head No Cont (07.15.21 @ 13:25) >  IMPRESSION:   Mild periventricular white matter ischemia. Moderate global atrophy.    < from: Xray Chest 1 View- PORTABLE-Urgent (Xray Chest 1 View- PORTABLE-Urgent .) (07.15.21 @ 13:13) >  IMPRESSION:  Atelectasis or scarring at the left lung base again seen.  Mild atelectasis at the right lung base.  Evidence of right rib and left shoulder fracture deformity which is new since September 2019, but may be old. Clinical correlation is recommended.

## 2021-07-29 RX ORDER — SPIRONOLACTONE 25 MG/1
1 TABLET, FILM COATED ORAL
Qty: 0 | Refills: 0 | DISCHARGE

## 2021-07-29 RX ORDER — LEVOTHYROXINE SODIUM 125 MCG
1 TABLET ORAL
Qty: 0 | Refills: 0 | DISCHARGE

## 2021-07-29 RX ORDER — LISINOPRIL 2.5 MG/1
1 TABLET ORAL
Qty: 0 | Refills: 0 | DISCHARGE

## 2021-07-29 RX ORDER — ATORVASTATIN CALCIUM 80 MG/1
1 TABLET, FILM COATED ORAL
Qty: 0 | Refills: 0 | DISCHARGE

## 2021-07-29 RX ORDER — CHOLECALCIFEROL (VITAMIN D3) 125 MCG
1 CAPSULE ORAL
Qty: 0 | Refills: 0 | DISCHARGE

## 2021-07-29 RX ORDER — DOCUSATE SODIUM 100 MG
1 CAPSULE ORAL
Qty: 0 | Refills: 0 | DISCHARGE

## 2021-07-29 RX ORDER — FUROSEMIDE 40 MG
1 TABLET ORAL
Qty: 0 | Refills: 0 | DISCHARGE

## 2022-03-30 NOTE — PATIENT PROFILE ADULT - NSPROPTRIGHTREPNAME_GEN_A__NUR
Simponi Counseling:  I discussed with the patient the risks of golimumab including but not limited to myelosuppression, immunosuppression, autoimmune hepatitis, demyelinating diseases, lymphoma, and serious infections.  The patient understands that monitoring is required including a PPD at baseline and must alert us or the primary physician if symptoms of infection or other concerning signs are noted. as above

## 2022-05-14 NOTE — ED ADULT TRIAGE NOTE - NS ED NURSE BANDS TYPE
Tried calling mom back, no answer, no voicemail.  I will try calling back in 5-10 minutes.   Name band;

## 2023-02-28 NOTE — PATIENT PROFILE ADULT - NS PRO AD ANY ON CHART
140  |  99  |  49<H>  ----------------------------<  110<H>  4.8   |  10<LL>  |  3.80<H>      143  |  113<H>  |  40<H>  ----------------------------<  24<LL>  4.3   |  9<LL>  |  2.46<H>    Ca    8.4      2023 13:00  Ca    5.6<LL>      2023 10:08    TPro  3.6<L>  /  Alb  2.5<L>  /  TBili  1.4<H>  /  DBili  x   /  AST  1756<H>  /  ALT  1429<H>  /  AlkPhos  217<H>    TPro  3.2<L>  /  Alb  2.2<L>  /  TBili  1.0  /  DBili  x   /  AST  183<H>  /  ALT  150<H>  /  AlkPhos  168<H>            PT/INR - ( 2023 10:08 )   PT: 37.3 sec;   INR: 3.18 ratio         PTT - ( 2023 10:08 )  PTT:36.4 sec              Urinalysis Basic - ( 2023 10:25 )    Color: Yellow / Appearance: Turbid / S.017 / pH: x  Gluc: x / Ketone: Negative  / Bili: Negative / Urobili: 3 mg/dL   Blood: x / Protein: 100 mg/dL / Nitrite: Negative   Leuk Esterase: Trace / RBC: 3 /HPF / WBC 25 /HPF   Sq Epi: x / Non Sq Epi: 2 /HPF / Bacteria: Many                              6.4    208.99 )-----------( 194      ( 2023 13:00 )             24.4                         7.2    173.73 )-----------( 77       ( 2023 10:08 )             24.8     CAPILLARY BLOOD GLUCOSE      POCT Blood Glucose.: 124 mg/dL (2023 12:28)  POCT Blood Glucose.: 96 mg/dL (2023 10:18)  POCT Blood Glucose.: 53 mg/dL (2023 09:59)  POCT Blood Glucose.: 52 mg/dL (2023 09:57) Yes

## 2023-10-09 NOTE — BEHAVIORAL HEALTH ASSESSMENT NOTE - SUICIDE RISK FACTORS
Current mood episode Tazorac Counseling:  Patient advised that medication is irritating and drying.  Patient may need to apply sparingly and wash off after an hour before eventually leaving it on overnight.  The patient verbalized understanding of the proper use and possible adverse effects of tazorac.  All of the patient's questions and concerns were addressed.

## 2023-11-22 NOTE — PATIENT PROFILE ADULT - NSTRANSFERBELONGINGSRESP_GEN_A_NUR
Called and lvm asking pt to return phone call to schedule an appt.     Electronically signed by Cyn Polo on 11/22/2023 at 11:53 AM
yes

## 2024-10-25 NOTE — H&P ADULT - VASCULAR
[de-identified] : 85 yo female with L shin wound. Improved. Equal and normal pulses (carotid, femoral, dorsalis pedis)